# Patient Record
Sex: FEMALE | Race: WHITE | Employment: OTHER
[De-identification: names, ages, dates, MRNs, and addresses within clinical notes are randomized per-mention and may not be internally consistent; named-entity substitution may affect disease eponyms.]

---

## 2017-07-15 ENCOUNTER — HEALTH MAINTENANCE LETTER (OUTPATIENT)
Age: 62
End: 2017-07-15

## 2018-06-11 ENCOUNTER — OFFICE VISIT (OUTPATIENT)
Dept: FAMILY MEDICINE | Facility: CLINIC | Age: 63
End: 2018-06-11
Payer: MEDICARE

## 2018-06-11 VITALS
DIASTOLIC BLOOD PRESSURE: 59 MMHG | BODY MASS INDEX: 28.7 KG/M2 | WEIGHT: 162 LBS | TEMPERATURE: 98.7 F | OXYGEN SATURATION: 96 % | SYSTOLIC BLOOD PRESSURE: 109 MMHG | HEIGHT: 63 IN | HEART RATE: 128 BPM

## 2018-06-11 DIAGNOSIS — R53.83 FATIGUE, UNSPECIFIED TYPE: ICD-10-CM

## 2018-06-11 DIAGNOSIS — R18.8 CIRRHOSIS OF LIVER WITH ASCITES, UNSPECIFIED HEPATIC CIRRHOSIS TYPE (H): Primary | ICD-10-CM

## 2018-06-11 DIAGNOSIS — K74.60 CIRRHOSIS OF LIVER WITH ASCITES, UNSPECIFIED HEPATIC CIRRHOSIS TYPE (H): Primary | ICD-10-CM

## 2018-06-11 LAB
ALBUMIN SERPL-MCNC: 2.3 G/DL (ref 3.4–5)
ALP SERPL-CCNC: 412 U/L (ref 40–150)
ALT SERPL W P-5'-P-CCNC: 20 U/L (ref 0–50)
ANION GAP SERPL CALCULATED.3IONS-SCNC: 18 MMOL/L (ref 3–14)
AST SERPL W P-5'-P-CCNC: 165 U/L (ref 0–45)
BASOPHILS # BLD AUTO: 0 10E9/L (ref 0–0.2)
BASOPHILS NFR BLD AUTO: 0.1 %
BILIRUB SERPL-MCNC: 1.5 MG/DL (ref 0.2–1.3)
BUN SERPL-MCNC: 13 MG/DL (ref 7–30)
CALCIUM SERPL-MCNC: 8.5 MG/DL (ref 8.5–10.1)
CHLORIDE SERPL-SCNC: 78 MMOL/L (ref 94–109)
CO2 SERPL-SCNC: 16 MMOL/L (ref 20–32)
CREAT SERPL-MCNC: 0.5 MG/DL (ref 0.52–1.04)
DIFFERENTIAL METHOD BLD: ABNORMAL
EOSINOPHIL # BLD AUTO: 0 10E9/L (ref 0–0.7)
EOSINOPHIL NFR BLD AUTO: 0.1 %
ERYTHROCYTE [DISTWIDTH] IN BLOOD BY AUTOMATED COUNT: 14.6 % (ref 10–15)
GFR SERPL CREATININE-BSD FRML MDRD: >90 ML/MIN/1.7M2
GLUCOSE SERPL-MCNC: 113 MG/DL (ref 70–99)
HCT VFR BLD AUTO: 24.3 % (ref 35–47)
HGB BLD-MCNC: 8.5 G/DL (ref 11.7–15.7)
IMM GRANULOCYTES # BLD: 0.4 10E9/L (ref 0–0.4)
IMM GRANULOCYTES NFR BLD: 2.5 %
INR PPP: 1.45 (ref 0.86–1.14)
LYMPHOCYTES # BLD AUTO: 1.2 10E9/L (ref 0.8–5.3)
LYMPHOCYTES NFR BLD AUTO: 7 %
MCH RBC QN AUTO: 33.9 PG (ref 26.5–33)
MCHC RBC AUTO-ENTMCNC: 35 G/DL (ref 31.5–36.5)
MCV RBC AUTO: 97 FL (ref 78–100)
MONOCYTES # BLD AUTO: 0.8 10E9/L (ref 0–1.3)
MONOCYTES NFR BLD AUTO: 4.4 %
NEUTROPHILS # BLD AUTO: 15.1 10E9/L (ref 1.6–8.3)
NEUTROPHILS NFR BLD AUTO: 85.9 %
NRBC # BLD AUTO: 0.1 10*3/UL
NRBC BLD AUTO-RTO: 1 /100
PLATELET # BLD AUTO: 380 10E9/L (ref 150–450)
POTASSIUM SERPL-SCNC: 3.7 MMOL/L (ref 3.4–5.3)
PROT SERPL-MCNC: 7 G/DL (ref 6.8–8.8)
RBC # BLD AUTO: 2.51 10E12/L (ref 3.8–5.2)
SODIUM SERPL-SCNC: 112 MMOL/L (ref 133–144)
T4 FREE SERPL-MCNC: 1.56 NG/DL (ref 0.76–1.46)
TSH SERPL DL<=0.005 MIU/L-ACNC: 2.84 MU/L (ref 0.4–4)
WBC # BLD AUTO: 17.6 10E9/L (ref 4–11)

## 2018-06-11 PROCEDURE — 85610 PROTHROMBIN TIME: CPT | Performed by: FAMILY MEDICINE

## 2018-06-11 PROCEDURE — 84439 ASSAY OF FREE THYROXINE: CPT | Performed by: FAMILY MEDICINE

## 2018-06-11 PROCEDURE — 84443 ASSAY THYROID STIM HORMONE: CPT | Performed by: FAMILY MEDICINE

## 2018-06-11 PROCEDURE — 85025 COMPLETE CBC W/AUTO DIFF WBC: CPT | Performed by: FAMILY MEDICINE

## 2018-06-11 PROCEDURE — 99214 OFFICE O/P EST MOD 30 MIN: CPT | Performed by: FAMILY MEDICINE

## 2018-06-11 PROCEDURE — 36415 COLL VENOUS BLD VENIPUNCTURE: CPT | Performed by: FAMILY MEDICINE

## 2018-06-11 PROCEDURE — 80053 COMPREHEN METABOLIC PANEL: CPT | Performed by: FAMILY MEDICINE

## 2018-06-11 RX ORDER — OMEGA-3 FATTY ACIDS/FISH OIL 300-1000MG
400 CAPSULE ORAL EVERY 4 HOURS PRN
Status: ON HOLD | COMMUNITY
Start: 2018-06-11 | End: 2018-06-18

## 2018-06-11 NOTE — MR AVS SNAPSHOT
After Visit Summary   6/11/2018    Karen Tee    MRN: [id number]           Patient Information     Date Of Birth          1955        Visit Information        Provider Department      6/11/2018 1:20 PM Tyler Khalil MD [facility]        Today's Diagnoses     Cirrhosis of liver with ascites, unspecified hepatic cirrhosis type (H)    -  1    Fatigue, unspecified type          Care Instructions          Thank you for choosing [facility].  You may be receiving a survey in the mail from [facility] regarding your visit today.  Please take a few minutes to complete and return the survey to let us know how we are doing.      If you have questions or concerns, please contact us via [employer] or you can contact your care team at [phone].    Our Clinic hours are:  Monday 6:40 am  to 7:00 pm  Tuesday -Friday 6:40 am to 5:00 pm    The [place] outpatient lab hours are:  Monday - Friday 6:10 am to 4:45 pm  Saturdays 7:00 am to 11:00 am  Appointments are required, call [phone]    If you have clinical questions after hours or would like to schedule an appointment,  call the clinic at [phone].    (K74.60) Cirrhosis of liver with ascites, unspecified hepatic cirrhosis type (H)  (primary encounter diagnosis)  Comment:   Plan: ibuprofen 200 MG capsule, CBC with platelets         differential, Comprehensive metabolic panel,         INR, US Abdomen Limited, RADIOLOGY REFERRAL        If help is needed for finding an AA group, or if alcohol treatment is desired, then just our clinic RN at [phone].   We discussed the above labs and do these today and we will call the results and recommendations.   If the fluid in the abdomen is too muck then consider the procedure called paracentesis, where the fluid is partially removed.   Call our Radiology/Diagnostic imaging dept at [phone] to schedule.   Consider the nutritional supplement one to three servings daily. You should  take a daily Multivitamin with about 100% of the required daily amounts (RDA)  Consider the minimal daily activities or exercise as discussed.   Melatonin is a natural brain hormone for sleep and you get this over the counter and use this 30-40 minutes before bed.   You may use Advil or Ibuprofen at 200 or 400 mg per dose, with food, up to 3 times daily. Avoid Tylenol.             Follow-ups after your visit        Additional Services     RADIOLOGY REFERRAL       Your provider has referred you to: [facility] Gonzalez; possible paracentesis for fluid.     Please be aware that coverage of these services is subject to the terms and limitations of your health insurance plan.  Call member services at your health plan with any benefit or coverage questions.      Please bring the following to your appointment:    >>   Any x-rays, CTs or MRIs which have been performed.  Contact the facility where they were done to arrange for  prior to your scheduled appointment.    >>   List of current medications   >>   This referral request   >>   Any documents/labs given to you for this referral    Prior authorization is required for MRI/MRA, CT, Dexa Scans and Worker's Compensation cases.                  Future tests that were ordered for you today     Open Future Orders        Priority Expected Expires Ordered    US Abdomen Limited Routine  6/11/2019 6/11/2018            Who to contact     If you have questions or need follow up information about today's clinic visit or your schedule please contact [facility] directly at [phone].  Normal or non-critical lab and imaging results will be communicated to you by MyChart, letter or phone within 4 business days after the clinic has received the results. If you do not hear from us within 7 days, please contact the clinic through MyChart or phone. If you have a critical or abnormal lab result, we will notify you by phone as soon as possible.  Submit refill requests through  "MyChart or call your pharmacy and they will forward the refill request to us. Please allow 3 business days for your refill to be completed.          Additional Information About Your Visit        Care EveryWhere ID     This is your Care EveryWhere ID. This could be used by other organizations to access your [place] medical records  [id number]        Your Vitals Were     Pulse Temperature Height Last Period Pulse Oximetry BMI (Body Mass Index)    128 98.7  F (37.1  C) (Tympanic) 5' 2.5\" (1.588 m) 05/19/2007 96% 29.16 kg/m2       Blood Pressure from Last 3 Encounters:   06/11/18 109/59   03/09/12 133/69   12/13/10 109/67    Weight from Last 3 Encounters:   06/11/18 162 lb (73.5 kg)   03/09/12 114 lb (51.7 kg)   12/13/10 113 lb (51.3 kg)              We Performed the Following     CBC with platelets differential     Comprehensive metabolic panel     INR     RADIOLOGY REFERRAL     T4 FREE     TSH        Primary Care Provider Office Phone # Fax #    Tyler Khalil [phone] [address] [facility] [address]        Equal Access to Services     DONNA KRISHNAN : Hadii aad ku hadasho Sodeeali, waaxda luqadaha, qaybta kaalmada adeegyada, kiki yin checo oquendo . So [facility] [phone].    ATENCIÓN: Si habla español, tiene a joseph disposición servicios gratuitos de asistencia lingüística. Llame al [phone].    We comply with applicable federal civil rights laws and [place] laws. We do not discriminate on the basis of race, color, national origin, age, disability, sex, sexual orientation, or gender identity.            Thank you!     Thank you for choosing [facility]  for your care. Our goal is always to provide you with excellent care. Hearing back from our patients is one way we can continue to improve our services. Please take a few minutes to complete the written survey that you may receive in the mail after your visit with us. Thank you!             Your " Updated Medication List - Protect others around you: Learn how to safely use, store and throw away your medicines at www.disposemymeds.org.          This list is accurate as of 6/11/18  2:20 PM.  Always use your most recent med list.                   Brand Name Dispense Instructions for use Diagnosis    ibuprofen 200 MG capsule      Taking as needed for pain.    Cirrhosis of liver with ascites, unspecified hepatic cirrhosis type (H)

## 2018-06-11 NOTE — PROGRESS NOTES
"  SUBJECTIVE:   Karen Tee is a 62 year old female who presents to clinic today for the following health issues:      PAIN AND BLOATING      Duration: 2 weeks    Description (location/character/radiation): States the Cirrhosis is getting worse.  Lower back pain from that for the past two weeks.  No recent injury or twisting. She states she has been drinking alcohol daily again. She is having 5-6 drinks daily, unknown amount. Likely 7 ounces daily.     Intensity:  Severe-can hardly walk.    Accompanying signs and symptoms: Bloating that has been getting worse the past six months.    History (similar episodes/previous evaluation): None    Precipitating or alleviating factors: Walking up or going to stand up is painful.  Sitting and laying down is fine.    Therapies tried and outcome: Advil as needed, that will help for a short time.         Current Outpatient Prescriptions:      ibuprofen 200 MG capsule, Taking as needed for pain., Disp: , Rfl:     Patient Active Problem List   Diagnosis     Chronic depressive personality disorder     Major depressive disorder, single episode, mild (H)     Elevated blood pressure reading without diagnosis of hypertension     Tobacco use disorder     Hyperlipidemia     Inflammatory disease of breast     Cirrhosis of liver (H)     Ascites     Portal hypertension (H)     HYPERLIPIDEMIA LDL GOAL <100     Alcoholism (H)       Blood pressure 109/59, pulse 128, temperature 98.7  F (37.1  C), temperature source Tympanic, height 5' 2.5\" (1.588 m), weight 162 lb (73.5 kg), last menstrual period 05/19/2007, SpO2 96 %.    Exam:  GENERAL APPEARANCE: alert, mild distress, cooperative, over weight and the abdomen is bloated.   EYES: EOMI,  PERRL  NECK: no adenopathy, no asymmetry, masses, or scars and thyroid normal to palpation  RESP: lungs clear to auscultation - no rales, rhonchi or wheezes  CV: regular rates and rhythm, normal S1 S2, no S3 or S4 and no murmur, click or rub -  ABDOMEN: " bloated but not tender. Large amount of ascites noted. The liver cannot be palpated.   PSYCH: affect flat, anxious and worried      (K74.60) Cirrhosis of liver with ascites, unspecified hepatic cirrhosis type (H)  (primary encounter diagnosis)  Comment:   Plan: ibuprofen 200 MG capsule, CBC with platelets         differential, Comprehensive metabolic panel,         INR, US Abdomen Limited, RADIOLOGY REFERRAL        If help is needed for finding an AA group, or if alcohol treatment is desired, then just our clinic RN at [phone].   We discussed the above labs and do these today and we will call the results and recommendations.   If the fluid in the abdomen is too muck then consider the procedure called paracentesis, where the fluid is partially removed.   Call our Radiology/Diagnostic imaging dept at [phone] to schedule.   Consider the nutritional supplement one to three servings daily. You should take a daily Multivitamin with about 100% of the required daily amounts (RDA)  Consider the minimal daily activities or exercise as discussed.   Melatonin is a natural brain hormone for sleep and you get this over the counter and use this 30-40 minutes before bed.   You may use Advil or Ibuprofen at 200 or 400 mg per dose, with food, up to 3 times daily. Avoid Tylenol.       Tyler Khalil

## 2018-06-11 NOTE — PATIENT INSTRUCTIONS
Thank you for choosing [facility].  You may be receiving a survey in the mail from [facility] regarding your visit today.  Please take a few minutes to complete and return the survey to let us know how we are doing.      If you have questions or concerns, please contact us via [employer] or you can contact your care team at [phone].    Our Clinic hours are:  Monday 6:40 am  to 7:00 pm  Tuesday -Friday 6:40 am to 5:00 pm    The [place] outpatient lab hours are:  Monday - Friday 6:10 am to 4:45 pm  Saturdays 7:00 am to 11:00 am  Appointments are required, call [phone]    If you have clinical questions after hours or would like to schedule an appointment,  call the clinic at [phone].    (K74.60) Cirrhosis of liver with ascites, unspecified hepatic cirrhosis type (H)  (primary encounter diagnosis)  Comment:   Plan: ibuprofen 200 MG capsule, CBC with platelets         differential, Comprehensive metabolic panel,         INR, US Abdomen Limited, RADIOLOGY REFERRAL        If help is needed for finding an AA group, or if alcohol treatment is desired, then just our clinic RN at [phone].   We discussed the above labs and do these today and we will call the results and recommendations.   If the fluid in the abdomen is too muck then consider the procedure called paracentesis, where the fluid is partially removed.   Call our Radiology/Diagnostic imaging dept at [phone] to schedule.   Consider the nutritional supplement one to three servings daily. You should take a daily Multivitamin with about 100% of the required daily amounts (RDA)  Consider the minimal daily activities or exercise as discussed.   Melatonin is a natural brain hormone for sleep and you get this over the counter and use this 30-40 minutes before bed.   You may use Advil or Ibuprofen at 200 or 400 mg per dose, with food, up to 3 times daily. Avoid Tylenol.

## 2018-06-12 ENCOUNTER — HOSPITAL ENCOUNTER (INPATIENT)
Facility: CLINIC | Age: 63
LOS: 6 days | Discharge: NURSING, REHAB OR LONG-TERM CARE | DRG: 432 | End: 2018-06-18
Attending: EMERGENCY MEDICINE | Admitting: INTERNAL MEDICINE
Payer: MEDICARE

## 2018-06-12 ENCOUNTER — APPOINTMENT (OUTPATIENT)
Dept: GENERAL RADIOLOGY | Facility: CLINIC | Age: 63
DRG: 432 | End: 2018-06-12
Attending: EMERGENCY MEDICINE
Payer: MEDICARE

## 2018-06-12 ENCOUNTER — TELEPHONE (OUTPATIENT)
Dept: FAMILY MEDICINE | Facility: CLINIC | Age: 63
End: 2018-06-12

## 2018-06-12 DIAGNOSIS — R18.8 CIRRHOSIS OF LIVER WITH ASCITES, UNSPECIFIED HEPATIC CIRRHOSIS TYPE (H): Primary | ICD-10-CM

## 2018-06-12 DIAGNOSIS — E87.1 HYPONATREMIA: ICD-10-CM

## 2018-06-12 DIAGNOSIS — K74.60 CIRRHOSIS OF LIVER WITH ASCITES, UNSPECIFIED HEPATIC CIRRHOSIS TYPE (H): Primary | ICD-10-CM

## 2018-06-12 DIAGNOSIS — K70.30 ALCOHOLIC CIRRHOSIS OF LIVER WITHOUT ASCITES (H): ICD-10-CM

## 2018-06-12 PROBLEM — R79.1 ELEVATED INR: Status: ACTIVE | Noted: 2018-06-12

## 2018-06-12 PROBLEM — D72.829 LEUKOCYTOSIS: Status: ACTIVE | Noted: 2018-06-12

## 2018-06-12 PROBLEM — R79.89 ABNORMAL LFTS: Status: ACTIVE | Noted: 2018-06-12

## 2018-06-12 LAB
ALBUMIN SERPL-MCNC: 2.4 G/DL (ref 3.4–5)
ALP SERPL-CCNC: 415 U/L (ref 40–150)
ALT SERPL W P-5'-P-CCNC: 20 U/L (ref 0–50)
AMMONIA PLAS-SCNC: 25 UMOL/L (ref 10–50)
ANION GAP SERPL CALCULATED.3IONS-SCNC: 15 MMOL/L (ref 3–14)
AST SERPL W P-5'-P-CCNC: 148 U/L (ref 0–45)
BASOPHILS # BLD AUTO: 0 10E9/L (ref 0–0.2)
BASOPHILS NFR BLD AUTO: 0.1 %
BILIRUB SERPL-MCNC: 1.6 MG/DL (ref 0.2–1.3)
BUN SERPL-MCNC: 15 MG/DL (ref 7–30)
CALCIUM SERPL-MCNC: 8.6 MG/DL (ref 8.5–10.1)
CHLORIDE SERPL-SCNC: 78 MMOL/L (ref 94–109)
CO2 SERPL-SCNC: 20 MMOL/L (ref 20–32)
CREAT SERPL-MCNC: 0.57 MG/DL (ref 0.52–1.04)
DIFFERENTIAL METHOD BLD: ABNORMAL
EOSINOPHIL # BLD AUTO: 0 10E9/L (ref 0–0.7)
EOSINOPHIL NFR BLD AUTO: 0.1 %
ERYTHROCYTE [DISTWIDTH] IN BLOOD BY AUTOMATED COUNT: 14.6 % (ref 10–15)
GFR SERPL CREATININE-BSD FRML MDRD: >90 ML/MIN/1.7M2
GLUCOSE BLDC GLUCOMTR-MCNC: 113 MG/DL (ref 70–99)
GLUCOSE BLDC GLUCOMTR-MCNC: 120 MG/DL (ref 70–99)
GLUCOSE SERPL-MCNC: 104 MG/DL (ref 70–99)
HCT VFR BLD AUTO: 24.1 % (ref 35–47)
HGB BLD-MCNC: 8.3 G/DL (ref 11.7–15.7)
IMM GRANULOCYTES # BLD: 0.4 10E9/L (ref 0–0.4)
IMM GRANULOCYTES NFR BLD: 2.2 %
LACTATE BLD-SCNC: 1.5 MMOL/L (ref 0.4–1.9)
LYMPHOCYTES # BLD AUTO: 1.5 10E9/L (ref 0.8–5.3)
LYMPHOCYTES NFR BLD AUTO: 9.1 %
MCH RBC QN AUTO: 32.9 PG (ref 26.5–33)
MCHC RBC AUTO-ENTMCNC: 34.4 G/DL (ref 31.5–36.5)
MCV RBC AUTO: 96 FL (ref 78–100)
MONOCYTES # BLD AUTO: 0.7 10E9/L (ref 0–1.3)
MONOCYTES NFR BLD AUTO: 4.3 %
MRSA DNA SPEC QL NAA+PROBE: NEGATIVE
NEUTROPHILS # BLD AUTO: 13.9 10E9/L (ref 1.6–8.3)
NEUTROPHILS NFR BLD AUTO: 84.2 %
NRBC # BLD AUTO: 0.1 10*3/UL
NRBC BLD AUTO-RTO: 1 /100
OSMOLALITY SERPL: 241 MMOL/KG (ref 280–301)
OSMOLALITY UR: 379 MMOL/KG (ref 100–1200)
PLATELET # BLD AUTO: 372 10E9/L (ref 150–450)
POTASSIUM SERPL-SCNC: 3.6 MMOL/L (ref 3.4–5.3)
PROT SERPL-MCNC: 7.2 G/DL (ref 6.8–8.8)
RBC # BLD AUTO: 2.52 10E12/L (ref 3.8–5.2)
SODIUM SERPL-SCNC: 113 MMOL/L (ref 133–144)
SODIUM SERPL-SCNC: 115 MMOL/L (ref 133–144)
SODIUM SERPL-SCNC: 115 MMOL/L (ref 133–144)
SODIUM UR-SCNC: 10 MMOL/L
SPECIMEN SOURCE: NORMAL
TSH SERPL DL<=0.005 MIU/L-ACNC: 2.95 MU/L (ref 0.4–4)
URATE 24H UR-MRATE: 0.8 G/G CR
URATE SERPL-MCNC: 8.2 MG/DL (ref 2.6–6)
URATE UR-MCNC: 125.8 MG/DL
WBC # BLD AUTO: 16.5 10E9/L (ref 4–11)

## 2018-06-12 PROCEDURE — 84295 ASSAY OF SERUM SODIUM: CPT | Performed by: EMERGENCY MEDICINE

## 2018-06-12 PROCEDURE — 99285 EMERGENCY DEPT VISIT HI MDM: CPT | Mod: 25 | Performed by: EMERGENCY MEDICINE

## 2018-06-12 PROCEDURE — 84300 ASSAY OF URINE SODIUM: CPT | Performed by: PHYSICIAN ASSISTANT

## 2018-06-12 PROCEDURE — 00000146 ZZHCL STATISTIC GLUCOSE BY METER IP

## 2018-06-12 PROCEDURE — 84550 ASSAY OF BLOOD/URIC ACID: CPT | Performed by: EMERGENCY MEDICINE

## 2018-06-12 PROCEDURE — 83930 ASSAY OF BLOOD OSMOLALITY: CPT | Performed by: EMERGENCY MEDICINE

## 2018-06-12 PROCEDURE — 82140 ASSAY OF AMMONIA: CPT | Performed by: EMERGENCY MEDICINE

## 2018-06-12 PROCEDURE — A9270 NON-COVERED ITEM OR SERVICE: HCPCS | Mod: GY | Performed by: PHYSICIAN ASSISTANT

## 2018-06-12 PROCEDURE — 25000132 ZZH RX MED GY IP 250 OP 250 PS 637: Mod: GY | Performed by: PHYSICIAN ASSISTANT

## 2018-06-12 PROCEDURE — 87640 STAPH A DNA AMP PROBE: CPT | Performed by: PHYSICIAN ASSISTANT

## 2018-06-12 PROCEDURE — 84560 ASSAY OF URINE/URIC ACID: CPT | Performed by: PHYSICIAN ASSISTANT

## 2018-06-12 PROCEDURE — 99223 1ST HOSP IP/OBS HIGH 75: CPT | Mod: AI | Performed by: INTERNAL MEDICINE

## 2018-06-12 PROCEDURE — 71046 X-RAY EXAM CHEST 2 VIEWS: CPT

## 2018-06-12 PROCEDURE — 20000003 ZZH R&B ICU

## 2018-06-12 PROCEDURE — 25000128 H RX IP 250 OP 636: Performed by: EMERGENCY MEDICINE

## 2018-06-12 PROCEDURE — 36415 COLL VENOUS BLD VENIPUNCTURE: CPT | Performed by: INTERNAL MEDICINE

## 2018-06-12 PROCEDURE — 84443 ASSAY THYROID STIM HORMONE: CPT | Performed by: EMERGENCY MEDICINE

## 2018-06-12 PROCEDURE — 25000132 ZZH RX MED GY IP 250 OP 250 PS 637: Mod: GY | Performed by: EMERGENCY MEDICINE

## 2018-06-12 PROCEDURE — 99207 ZZC APP CREDIT; MD BILLING SHARED VISIT: CPT | Performed by: PHYSICIAN ASSISTANT

## 2018-06-12 PROCEDURE — 80053 COMPREHEN METABOLIC PANEL: CPT | Performed by: EMERGENCY MEDICINE

## 2018-06-12 PROCEDURE — 25000128 H RX IP 250 OP 636: Performed by: PHYSICIAN ASSISTANT

## 2018-06-12 PROCEDURE — 96361 HYDRATE IV INFUSION ADD-ON: CPT | Performed by: EMERGENCY MEDICINE

## 2018-06-12 PROCEDURE — 84295 ASSAY OF SERUM SODIUM: CPT | Performed by: INTERNAL MEDICINE

## 2018-06-12 PROCEDURE — 82533 TOTAL CORTISOL: CPT | Performed by: EMERGENCY MEDICINE

## 2018-06-12 PROCEDURE — 96360 HYDRATION IV INFUSION INIT: CPT | Performed by: EMERGENCY MEDICINE

## 2018-06-12 PROCEDURE — 83605 ASSAY OF LACTIC ACID: CPT | Performed by: INTERNAL MEDICINE

## 2018-06-12 PROCEDURE — 87641 MR-STAPH DNA AMP PROBE: CPT | Performed by: PHYSICIAN ASSISTANT

## 2018-06-12 PROCEDURE — HZ2ZZZZ DETOXIFICATION SERVICES FOR SUBSTANCE ABUSE TREATMENT: ICD-10-PCS | Performed by: FAMILY MEDICINE

## 2018-06-12 PROCEDURE — A9270 NON-COVERED ITEM OR SERVICE: HCPCS | Mod: GY | Performed by: EMERGENCY MEDICINE

## 2018-06-12 PROCEDURE — 85025 COMPLETE CBC W/AUTO DIFF WBC: CPT | Performed by: EMERGENCY MEDICINE

## 2018-06-12 PROCEDURE — 83935 ASSAY OF URINE OSMOLALITY: CPT | Performed by: PHYSICIAN ASSISTANT

## 2018-06-12 RX ORDER — BISACODYL 5 MG
15 TABLET, DELAYED RELEASE (ENTERIC COATED) ORAL DAILY PRN
Status: DISCONTINUED | OUTPATIENT
Start: 2018-06-12 | End: 2018-06-18 | Stop reason: HOSPADM

## 2018-06-12 RX ORDER — HYDROMORPHONE HYDROCHLORIDE 1 MG/ML
.3-.5 INJECTION, SOLUTION INTRAMUSCULAR; INTRAVENOUS; SUBCUTANEOUS
Status: DISCONTINUED | OUTPATIENT
Start: 2018-06-12 | End: 2018-06-18 | Stop reason: HOSPADM

## 2018-06-12 RX ORDER — PROCHLORPERAZINE MALEATE 10 MG
10 TABLET ORAL EVERY 6 HOURS PRN
Status: DISCONTINUED | OUTPATIENT
Start: 2018-06-12 | End: 2018-06-12

## 2018-06-12 RX ORDER — PROCHLORPERAZINE 25 MG
25 SUPPOSITORY, RECTAL RECTAL EVERY 12 HOURS PRN
Status: DISCONTINUED | OUTPATIENT
Start: 2018-06-12 | End: 2018-06-18 | Stop reason: HOSPADM

## 2018-06-12 RX ORDER — POLYETHYLENE GLYCOL 3350 17 G/17G
17 POWDER, FOR SOLUTION ORAL DAILY PRN
Status: DISCONTINUED | OUTPATIENT
Start: 2018-06-12 | End: 2018-06-18 | Stop reason: HOSPADM

## 2018-06-12 RX ORDER — MAGNESIUM CARB/ALUMINUM HYDROX 105-160MG
148 TABLET,CHEWABLE ORAL
Status: DISCONTINUED | OUTPATIENT
Start: 2018-06-12 | End: 2018-06-18 | Stop reason: HOSPADM

## 2018-06-12 RX ORDER — NALOXONE HYDROCHLORIDE 0.4 MG/ML
.1-.4 INJECTION, SOLUTION INTRAMUSCULAR; INTRAVENOUS; SUBCUTANEOUS
Status: DISCONTINUED | OUTPATIENT
Start: 2018-06-12 | End: 2018-06-18 | Stop reason: HOSPADM

## 2018-06-12 RX ORDER — ONDANSETRON 2 MG/ML
4 INJECTION INTRAMUSCULAR; INTRAVENOUS EVERY 6 HOURS PRN
Status: DISCONTINUED | OUTPATIENT
Start: 2018-06-12 | End: 2018-06-12

## 2018-06-12 RX ORDER — NALOXONE HYDROCHLORIDE 0.4 MG/ML
.1-.4 INJECTION, SOLUTION INTRAMUSCULAR; INTRAVENOUS; SUBCUTANEOUS
Status: DISCONTINUED | OUTPATIENT
Start: 2018-06-12 | End: 2018-06-12

## 2018-06-12 RX ORDER — PROCHLORPERAZINE MALEATE 10 MG
10 TABLET ORAL EVERY 6 HOURS PRN
Status: DISCONTINUED | OUTPATIENT
Start: 2018-06-12 | End: 2018-06-18 | Stop reason: HOSPADM

## 2018-06-12 RX ORDER — FUROSEMIDE 10 MG/ML
20 INJECTION INTRAMUSCULAR; INTRAVENOUS ONCE
Status: COMPLETED | OUTPATIENT
Start: 2018-06-12 | End: 2018-06-12

## 2018-06-12 RX ORDER — ONDANSETRON 4 MG/1
4 TABLET, ORALLY DISINTEGRATING ORAL EVERY 6 HOURS PRN
Status: DISCONTINUED | OUTPATIENT
Start: 2018-06-12 | End: 2018-06-18 | Stop reason: HOSPADM

## 2018-06-12 RX ORDER — FUROSEMIDE 10 MG/ML
20 INJECTION INTRAMUSCULAR; INTRAVENOUS ONCE
Status: DISCONTINUED | OUTPATIENT
Start: 2018-06-13 | End: 2018-06-12

## 2018-06-12 RX ORDER — SPIRONOLACTONE 25 MG/1
25 TABLET ORAL DAILY
Status: DISCONTINUED | OUTPATIENT
Start: 2018-06-12 | End: 2018-06-13

## 2018-06-12 RX ORDER — IBUPROFEN 400 MG/1
400 TABLET, FILM COATED ORAL ONCE
Status: COMPLETED | OUTPATIENT
Start: 2018-06-12 | End: 2018-06-12

## 2018-06-12 RX ORDER — BISACODYL 5 MG
10 TABLET, DELAYED RELEASE (ENTERIC COATED) ORAL DAILY PRN
Status: DISCONTINUED | OUTPATIENT
Start: 2018-06-12 | End: 2018-06-18 | Stop reason: HOSPADM

## 2018-06-12 RX ORDER — LORAZEPAM 2 MG/ML
1-4 INJECTION INTRAMUSCULAR
Status: DISCONTINUED | OUTPATIENT
Start: 2018-06-12 | End: 2018-06-18 | Stop reason: HOSPADM

## 2018-06-12 RX ORDER — LORAZEPAM 2 MG/ML
1-4 INJECTION INTRAMUSCULAR
Status: DISCONTINUED | OUTPATIENT
Start: 2018-06-12 | End: 2018-06-15

## 2018-06-12 RX ORDER — ONDANSETRON 4 MG/1
4 TABLET, ORALLY DISINTEGRATING ORAL EVERY 6 HOURS PRN
Status: DISCONTINUED | OUTPATIENT
Start: 2018-06-12 | End: 2018-06-12

## 2018-06-12 RX ORDER — ONDANSETRON 2 MG/ML
4 INJECTION INTRAMUSCULAR; INTRAVENOUS EVERY 6 HOURS PRN
Status: DISCONTINUED | OUTPATIENT
Start: 2018-06-12 | End: 2018-06-18 | Stop reason: HOSPADM

## 2018-06-12 RX ORDER — BISACODYL 5 MG
5 TABLET, DELAYED RELEASE (ENTERIC COATED) ORAL DAILY PRN
Status: DISCONTINUED | OUTPATIENT
Start: 2018-06-12 | End: 2018-06-18 | Stop reason: HOSPADM

## 2018-06-12 RX ORDER — PROCHLORPERAZINE 25 MG
25 SUPPOSITORY, RECTAL RECTAL EVERY 12 HOURS PRN
Status: DISCONTINUED | OUTPATIENT
Start: 2018-06-12 | End: 2018-06-12

## 2018-06-12 RX ADMIN — IBUPROFEN 400 MG: 400 TABLET ORAL at 13:54

## 2018-06-12 RX ADMIN — SODIUM CHLORIDE 1000 ML: 9 INJECTION, SOLUTION INTRAVENOUS at 10:50

## 2018-06-12 RX ADMIN — FUROSEMIDE 20 MG: 10 INJECTION, SOLUTION INTRAVENOUS at 16:22

## 2018-06-12 RX ADMIN — FUROSEMIDE 20 MG: 10 INJECTION, SOLUTION INTRAVENOUS at 19:31

## 2018-06-12 RX ADMIN — SPIRONOLACTONE 25 MG: 25 TABLET ORAL at 16:22

## 2018-06-12 NOTE — LETTER
Transition Communication Hand-off for Care Transitions to Next Level of Care Provider    Name: Karen Tee  : 1955  MRN #: [id number]  Primary Care Provider: Tyler Khalil  Primary Care MD Name: Dr. Khalil  Primary Clinic: [address] [place] [id number]  [facility]  Reason for Hospitalization:  Hyponatremia [E87.1]  Admit Date/Time: 2018 10:55 AM  Discharge Date: 18  Payor Source: Payor: MEDICARE / Plan: MEDICARE / Product Type: Medicare /     Readmission Assessment Measure (OZZIE) Risk Score/category: low           Reason for Communication Hand-off Referral: Fragility    Discharge Plan: TCU       Concern for non-adherence with plan of care:   Y/N no  Discharge Needs Assessment:  Needs       Most Recent Value    # of Referrals Placed by [facility] Internal Clinic Care Coordination, Post Acute Facilities, Homecare          Follow-up plan:  No future appointments.    Any outstanding tests or procedures:        Referrals     Future Labs/Procedures    Occupational Therapy Adult Consult     Comments:    Evaluate and treat as clinically indicated.    Reason:  Weak cirrhosis    Physical Therapy Adult Consult     Comments:    Evaluate and treat as clinically indicated.    Reason:  Weak, cirrhosis            Key Recommendations:  Pt is discharging today to [facility] TCU Phone: (Admissions: [phone] RN Report: [phone] Fax: [phone]) . Pt in agreement with dc plan. Goal is for pt to return home. Per TALYA Esqueda she is committed to quitting drinking and smoking.     Trinity Rodgers MSW, [facility], [phone]      AVS/Discharge Summary is the source of truth; this is a helpful guide for improved communication of patient story

## 2018-06-12 NOTE — IP AVS SNAPSHOT
MRN:[id number]                      After Visit Summary   6/12/2018    Karen Tee    MRN: [id number]           Thank you!     Thank you for choosing [facility] for your care. Our goal is always to provide you with excellent care. Hearing back from our patients is one way we can continue to improve our services. Please take a few minutes to complete the written survey that you may receive in the mail after you visit with us. Thank you!        Patient Information     Date Of Birth          1955        Designated Caregiver       Most Recent Value    Caregiver    Will someone help with your care after discharge? yes    Name of designated caregiver sister    Phone number of caregiver [phone]    Caregiver address [place]      About your hospital stay     You were admitted on:  June 12, 2018 You last received care in the:  [facility] Intensive Care    You were discharged on:  June 18, 2018       Who to Call     For medical emergencies, please call 911.  For non-urgent questions about your medical care, please call your primary care provider or clinic, [phone]          Attending Provider     Provider Specialty    Joe Ma MD Emergency Medicine    [facility], Moy Liang MD Internal Medicine    [facility]    Mary Ellen Mazariegos MD Internal Medicine       Primary Care Provider Office Phone # Fax #    Tyler Gasper Khalil [phone]      After Care Instructions     Activity - Up with nursing assistance           Advance Diet as Tolerated       Follow this diet upon discharge:            Daily weights       Call Provider for weight gain of more than 2 pounds per day or 5 pounds per week.            Follow Up (TCM)       Need cbc and BMP daily for 3 days and then as per [facility] doc.            Follow Up (TCM)       Follow up primary md 1 week            General info for SNF       Length of Stay Estimate: Short Term Care: Estimated # of  "Days <30  Condition at Discharge: Improving  Level of care:skilled   Rehabilitation Potential: Good  Admission H&P remains valid and up-to-date: Yes  Recent Chemotherapy: N/A  Use Nursing Home Standing Orders: N/A            Mantoux instructions       Give two-step Mantoux (PPD) Per Facility Policy                  Additional Services     Occupational Therapy Adult Consult       Evaluate and treat as clinically indicated.    Reason:  Weak cirrhosis            Physical Therapy Adult Consult       Evaluate and treat as clinically indicated.    Reason:  Weak, cirrhosis                  Pending Results     Date and Time Order Name Status Description    6/16/2018 1818 Blood culture Preliminary     6/16/2018 1818 Blood culture Preliminary     6/13/2018 1307 Anaerobic bacterial culture Preliminary             Statement of Approval     Ordered          06/18/18 1157  I have reviewed and agree with all the recommendations and orders detailed in this document.  EFFECTIVE NOW     Approved and electronically signed by:  Mary Ellen Mazariegos MD             Admission Information     Date & Time Provider Department Dept. Phone    6/12/2018 Mary Ellen Mazariegos MD [facility]ensive [facility] [phone]      Your Vitals Were     Blood Pressure Pulse Temperature Respirations Height Weight    95/58 (BP Location: Left arm) 96 98.2  F (36.8  C) (Oral) 18 1.6 m (5' 3\") 65.6 kg (144 lb 10 oz)    Last Period Pulse Oximetry BMI (Body Mass Index)             05/19/2007 96% 25.62 kg/m2         Care EveryWhere ID     This is your Care EveryWhere ID. This could be used by other organizations to access your [place] medical records  [id number]        Equal Access to Services     [facility] ARIELLA AH: Hadii philippe Peace, wajordenda lujose manueladaha, qaybta kaalmada harper, kiki hobbs. So [facility] [phone].    ATENCIÓN: Si habla español, tiene a joseph disposición servicios gratuitos de asistencia lingüística. Llame al " [phone].    We comply with applicable federal civil rights laws and [place] laws. We do not discriminate on the basis of race, color, national origin, age, disability, sex, sexual orientation, or gender identity.               Review of your medicines      START taking        Dose / Directions    folic acid 1 MG tablet   Commonly known as:  FOLVITE   Used for:  Alcoholic cirrhosis of liver without ascites (H)        Dose:  1 mg   Start taking on:  6/19/2018   Take 1 tablet (1 mg) by mouth daily   Quantity:  30 tablet   Refills:  0       furosemide 40 MG tablet   Commonly known as:  LASIX        Dose:  40 mg   Start taking on:  6/19/2018   Take 1 tablet (40 mg) by mouth daily   Quantity:  30 tablet   Refills:  0       spironolactone 25 MG tablet   Commonly known as:  ALDACTONE   Used for:  Cirrhosis of liver with ascites, unspecified hepatic cirrhosis type (H)        Dose:  25 mg   Start taking on:  6/19/2018   Take 1 tablet (25 mg) by mouth daily   Quantity:  30 tablet   Refills:  0         STOP taking     ibuprofen 200 MG capsule                Where to get your medicines      Some of these will need a paper prescription and others can be bought over the counter. Ask your nurse if you have questions.     You don't need a prescription for these medications     folic acid 1 MG tablet    furosemide 40 MG tablet    spironolactone 25 MG tablet                Protect others around you: Learn how to safely use, store and throw away your medicines at www.disposemymeds.org.             Medication List: This is a list of all your medications and when to take them. Check marks below indicate your daily home schedule. Keep this list as a reference.      Medications           Morning Afternoon Evening Bedtime As Needed    folic acid 1 MG tablet   Commonly known as:  FOLVITE   Take 1 tablet (1 mg) by mouth daily   Start taking on:  6/19/2018   Last time this was given:  1 mg on 6/18/2018  8:37 AM                                 furosemide 40 MG tablet   Commonly known as:  LASIX   Take 1 tablet (40 mg) by mouth daily   Start taking on:  6/19/2018   Last time this was given:  40 mg on 6/18/2018  8:37 AM                                spironolactone 25 MG tablet   Commonly known as:  ALDACTONE   Take 1 tablet (25 mg) by mouth daily   Start taking on:  6/19/2018   Last time this was given:  25 mg on 6/18/2018  8:38 AM

## 2018-06-12 NOTE — IP AVS SNAPSHOT
"[facility] INTENSIVE CARE: [phone]                                              INTERAGENCY TRANSFER FORM - LAB / IMAGING / EKG / EMG RESULTS   2018                    Hospital Admission Date: 2018  KAR RINCON   : 1955  Sex: Female        Attending Provider: Mary Ellen Mazariegos MD     Allergies:  No Known Allergies    Infection:  None   Service:  ICU    Ht:  1.6 m (5' 3\")   Wt:  65.6 kg (144 lb 10 oz)   Admission Wt:  76.2 kg (168 lb)    BMI:  25.62 kg/m 2   BSA:  1.71 m 2            Patient PCP Information     Provider PCP Type    Tyler Khalil MD General         Lab Results - 3 Days      Fluid Culture Aerobic Bacterial [[id number]]  Resulted: 18 0819, Result status: Final result    Ordering provider: Moy Mckeon MD  18 1307 Resulting lab: INFECTIOUS DISEASE DIAGNOSTIC LABORATORY    Specimen Information    Type Source Collected On   Ascites Ascites 18 1440          Components       Value Reference Range Flag Lab   Specimen Description Ascites      Special Requests Received in anaerobic tubes.   75   Culture Micro No growth   225            Blood culture [[id number]]  Resulted: 18 0613, Result status: Preliminary result    Ordering provider: Garth Shepard MD  18 181 Resulting lab: MICRO RAPID TESTING LAB    Specimen Information    Type Source Collected On   Blood  18 1859   Comment:  Left Arm          Components       Value Reference Range Flag Lab   Specimen Description Blood Left Arm      Special Requests Aerobic and anaerobic bottles received   75   Culture Micro No growth after 1 day   226            Blood culture [[id number]]  Resulted: 18 0613, Result status: Preliminary result    Ordering provider: Garth Shepard MD  18 181 Resulting lab: MICRO RAPID TESTING LAB    Specimen Information    Type Source Collected On   Blood  18 1907   Comment:  Right Arm          Components       Value Reference " Range Flag Lab   Specimen Description Blood Right Arm      Special Requests Aerobic and anaerobic bottles received   75   Culture Micro No growth after 1 day   226            Comprehensive metabolic panel [[id number]] (Abnormal)  Resulted: 06/18/18 0548, Result status: Final result    Ordering provider: Garth Shepard MD  06/18/18 0000 Resulting lab: [facility]    Specimen Information    Type Source Collected On   Blood  06/18/18 0526          Components       Value Reference Range Flag Lab   Sodium 130 133 - 144 mmol/L L 59   Potassium 4.0 3.4 - 5.3 mmol/L  59   Chloride 96 94 - 109 mmol/L  59   Carbon Dioxide 22 20 - 32 mmol/L  59   Anion Gap 12 3 - 14 mmol/L  59   Glucose 86 70 - 99 mg/dL  59   Urea Nitrogen 15 7 - 30 mg/dL  59   Creatinine 0.61 0.52 - 1.04 mg/dL  59   GFR Estimate >90 >60 mL/min/1.7m2  59   Comment:  Non  GFR Calc   GFR Estimate If Black >90 >60 mL/min/1.7m2  59   Comment:  African American GFR Calc   Calcium 7.7 8.5 - 10.1 mg/dL L 59   Bilirubin Total 1.0 0.2 - 1.3 mg/dL  59   Albumin 2.1 3.4 - 5.0 g/dL L 59   Protein Total 5.8 6.8 - 8.8 g/dL L 59   Alkaline Phosphatase 281 40 - 150 U/L H 59   ALT 12 0 - 50 U/L  59   AST 93 0 - 45 U/L H 59            CBC with platelets [[id number]] (Abnormal)  Resulted: 06/18/18 0531, Result status: Final result    Ordering provider: Garth Shepard MD  06/18/18 0000 Resulting lab: [facility]    Specimen Information    Type Source Collected On   Blood  06/18/18 0526          Components       Value Reference Range Flag Lab   WBC 13.5 4.0 - 11.0 10e9/L H 59   RBC Count 2.61 3.8 - 5.2 10e12/L L 59   Hemoglobin 8.7 11.7 - 15.7 g/dL L 59   Hematocrit 25.1 35.0 - 47.0 % L 59   MCV 96 78 - 100 fl  59   MCH 33.3 26.5 - 33.0 pg H 59   MCHC 34.7 31.5 - 36.5 g/dL  59   RDW 16.1 10.0 - 15.0 % H 59   Platelet Count 189 150 - 450 10e9/L  59            Sodium [[id number]] (Abnormal)  Resulted: 06/17/18 7031, Result  status: Final result    Ordering provider: Garth Shepard MD  06/17/18 1600 Resulting lab: [facility]    Specimen Information    Type Source Collected On   Blood  06/17/18 2158          Components       Value Reference Range Flag Lab   Sodium 130 133 - 144 mmol/L L 59            Sodium [[id number]] (Abnormal)  Resulted: 06/17/18 1432, Result status: Final result    Ordering provider: Garth Shepard MD  06/17/18 0800 Resulting lab: [facility]    Specimen Information    Type Source Collected On   Blood  06/17/18 1411          Components       Value Reference Range Flag Lab   Sodium 129 133 - 144 mmol/L L 59            Vitamin B12 [[id number]]  Resulted: 06/17/18 1419, Result status: Final result    Ordering provider: Garth Shepard MD  06/17/18 0745 Resulting lab: [facility]    Specimen Information    Type Source Collected On   Blood  06/14/18 0440          Components       Value Reference Range Flag Lab   Vitamin B12 679 193 - 986 pg/mL  51            Folate [[id number]]  Resulted: 06/17/18 1408, Result status: Final result    Ordering provider: Garth Shepard MD  06/17/18 0745 Resulting lab: [facility]    Specimen Information    Type Source Collected On   Blood  06/17/18 0500          Components       Value Reference Range Flag Lab   Folate 7.8 >5.4 ng/mL  51            Comprehensive metabolic panel [[id number]] (Abnormal)  Resulted: 06/17/18 0529, Result status: Final result    Ordering provider: Garth Shepard MD  06/17/18 0000 Resulting lab: [facility]    Specimen Information    Type Source Collected On   Blood  06/17/18 0503          Components       Value Reference Range Flag Lab   Sodium 128 133 - 144 mmol/L L 59   Potassium 3.8 3.4 - 5.3 mmol/L  59   Chloride 93 94 - 109 mmol/L L 59   Carbon Dioxide 25 20 - 32 mmol/L  59   Anion Gap 10 3 - 14 mmol/L  59   Glucose 92 70 -  99 mg/dL  59   Urea Nitrogen 14 7 - 30 mg/dL  59   Creatinine 0.60 0.52 - 1.04 mg/dL  59   GFR Estimate >90 >60 mL/min/1.7m2  59   Comment:  Non  GFR Calc   GFR Estimate If Black >90 >60 mL/min/1.7m2  59   Comment:  African American GFR Calc   Calcium 7.7 8.5 - 10.1 mg/dL L 59   Bilirubin Total 0.8 0.2 - 1.3 mg/dL  59   Albumin 2.2 3.4 - 5.0 g/dL L 59   Protein Total 5.7 6.8 - 8.8 g/dL L 59   Alkaline Phosphatase 300 40 - 150 U/L H 59   ALT 13 0 - 50 U/L  59    0 - 45 U/L H 59            Lactic acid whole blood [[id number]]  Resulted: 06/17/18 0510, Result status: Final result    Ordering provider: Garth Shepard MD  06/17/18 0000 Resulting lab: [facility]    Specimen Information    Type Source Collected On   Blood  06/17/18 0503          Components       Value Reference Range Flag Lab   Lactic Acid 1.3 0.7 - 2.0 mmol/L  59            CBC with platelets [[id number]] (Abnormal)  Resulted: 06/17/18 0509, Result status: Final result    Ordering provider: Garth Shepard MD  06/17/18 0000 Resulting lab: [facility]    Specimen Information    Type Source Collected On   Blood  06/17/18 0503          Components       Value Reference Range Flag Lab   WBC 13.2 4.0 - 11.0 10e9/L H 59   RBC Count 2.07 3.8 - 5.2 10e12/L L 59   Hemoglobin 7.0 11.7 - 15.7 g/dL L 59   Hematocrit 20.1 35.0 - 47.0 % L 59   MCV 97 78 - 100 fl  59   MCH 33.8 26.5 - 33.0 pg H 59   MCHC 34.8 31.5 - 36.5 g/dL  59   RDW 15.5 10.0 - 15.0 % H 59   Platelet Count 219 150 - 450 10e9/L  59            Sodium [[id number]] (Abnormal)  Resulted: 06/16/18 2302, Result status: Final result    Ordering provider: Garth Shepard MD  06/16/18 1600 Resulting lab: [facility]    Specimen Information    Type Source Collected On   Blood  06/16/18 2247          Components       Value Reference Range Flag Lab   Sodium 126 133 - 144 mmol/L L 59            Lactic acid whole blood [[id number]]  (Abnormal)  Resulted: 06/16/18 1745, Result status: Final result    Ordering provider: Garth Shepard MD  06/16/18 1655 Resulting lab: [facility]    Specimen Information    Type Source Collected On   Blood  06/16/18 1720          Components       Value Reference Range Flag Lab   Lactic Acid 2.2 0.7 - 2.0 mmol/L H 59   Comment:         Significant value called to and read back by  VITO SALMERON RN BY  1744 06/16/2018              Sodium [[id number]] (Abnormal)  Resulted: 06/16/18 1430, Result status: Final result    Ordering provider: Garth Shepard MD  06/16/18 0800 Resulting lab: [facility]    Specimen Information    Type Source Collected On   Blood  06/16/18 1411          Components       Value Reference Range Flag Lab   Sodium 124 133 - 144 mmol/L L 59            Basic metabolic panel [[id number]] (Abnormal)  Resulted: 06/16/18 0546, Result status: Final result    Ordering provider: Garth Shepard MD  06/16/18 0000 Resulting lab: [facility]    Specimen Information    Type Source Collected On   Blood  06/16/18 0515          Components       Value Reference Range Flag Lab   Sodium 124 133 - 144 mmol/L L 59   Potassium 3.6 3.4 - 5.3 mmol/L  59   Chloride 88 94 - 109 mmol/L L 59   Carbon Dioxide 26 20 - 32 mmol/L  59   Anion Gap 10 3 - 14 mmol/L  59   Glucose 97 70 - 99 mg/dL  59   Urea Nitrogen 15 7 - 30 mg/dL  59   Creatinine 0.58 0.52 - 1.04 mg/dL  59   GFR Estimate >90 >60 mL/min/1.7m2  59   Comment:  Non  GFR Calc   GFR Estimate If Black >90 >60 mL/min/1.7m2  59   Comment:  African American GFR Calc   Calcium 7.7 8.5 - 10.1 mg/dL L 59            CBC with platelets [[id number]] (Abnormal)  Resulted: 06/16/18 0529, Result status: Final result    Ordering provider: Garth Shepard MD  06/16/18 0000 Resulting lab: [facility]    Specimen Information    Type Source Collected On   Blood  06/16/18 0515           Components       Value Reference Range Flag Lab   WBC 14.1 4.0 - 11.0 10e9/L H 59   RBC Count 2.16 3.8 - 5.2 10e12/L L 59   Hemoglobin 7.2 11.7 - 15.7 g/dL L 59   Hematocrit 20.6 35.0 - 47.0 % L 59   MCV 95 78 - 100 fl  59   MCH 33.3 26.5 - 33.0 pg H 59   MCHC 35.0 31.5 - 36.5 g/dL  59   RDW 15.0 10.0 - 15.0 %  59   Platelet Count 251 150 - 450 10e9/L  59            Sodium [[id number]] (Abnormal)  Resulted: 06/15/18 2319, Result status: Final result    Ordering provider: Garth Shepard MD  06/15/18 1600 Resulting lab: [facility]    Specimen Information    Type Source Collected On   Blood  06/15/18 2304          Components       Value Reference Range Flag Lab   Sodium 124 133 - 144 mmol/L L 59            Lactic acid level STAT for sepsis protocol [[id number]]  Resulted: 06/15/18 1737, Result status: Final result    Ordering provider: Garth Shepard MD  06/15/18 1615 Resulting lab: [facility]    Specimen Information    Type Source Collected On   Blood  06/15/18 1729          Components       Value Reference Range Flag Lab   Lactate for Sepsis Protocol 1.4 0.4 - 1.9 mmol/L  59            Sodium [[id number]] (Abnormal)  Resulted: 06/15/18 1440, Result status: Final result    Ordering provider: Garth Shepard MD  06/15/18 0800 Resulting lab: [facility]    Specimen Information    Type Source Collected On   Blood  06/15/18 1424          Components       Value Reference Range Flag Lab   Sodium 122 133 - 144 mmol/L L 59            Comprehensive metabolic panel [[id number]] (Abnormal)  Resulted: 06/15/18 0546, Result status: Final result    Ordering provider: Garth Shepard MD  06/15/18 0000 Resulting lab: [facility]    Specimen Information    Type Source Collected On   Blood  06/15/18 0515          Components       Value Reference Range Flag Lab   Sodium 119 133 - 144 mmol/L LL 59   Potassium 3.8 3.4 - 5.3 mmol/L  59   Chloride 85 94 -  109 mmol/L L 59   Carbon Dioxide 26 20 - 32 mmol/L  59   Anion Gap 8 3 - 14 mmol/L  59   Glucose 98 70 - 99 mg/dL  59   Urea Nitrogen 14 7 - 30 mg/dL  59   Creatinine 0.60 0.52 - 1.04 mg/dL  59   GFR Estimate >90 >60 mL/min/1.7m2  59   Comment:  Non  GFR Calc   GFR Estimate If Black >90 >60 mL/min/1.7m2  59   Comment:  African American GFR Calc   Calcium 7.8 8.5 - 10.1 mg/dL L 59   Bilirubin Total 1.1 0.2 - 1.3 mg/dL  59   Albumin 2.4 3.4 - 5.0 g/dL L 59   Protein Total 6.0 6.8 - 8.8 g/dL L 59   Alkaline Phosphatase 317 40 - 150 U/L H 59   ALT 15 0 - 50 U/L  59    0 - 45 U/L H 59            Ammonia [[id number]]  Resulted: 06/15/18 0536, Result status: Final result    Ordering provider: Garth Shepard MD  06/15/18 0000 Resulting lab: [facility]    Specimen Information    Type Source Collected On   Blood  06/15/18 0515          Components       Value Reference Range Flag Lab   Ammonia 34 10 - 50 umol/L  59            CBC with platelets [[id number]] (Abnormal)  Resulted: 06/15/18 0521, Result status: Final result    Ordering provider: Garth Shepard MD  06/15/18 0000 Resulting lab: [facility]    Specimen Information    Type Source Collected On   Blood  06/15/18 0515          Components       Value Reference Range Flag Lab   WBC 12.7 4.0 - 11.0 10e9/L H 59   RBC Count 2.19 3.8 - 5.2 10e12/L L 59   Hemoglobin 7.4 11.7 - 15.7 g/dL L 59   Hematocrit 21.0 35.0 - 47.0 % L 59   MCV 96 78 - 100 fl  59   MCH 33.8 26.5 - 33.0 pg H 59   MCHC 35.2 31.5 - 36.5 g/dL  59   RDW 14.9 10.0 - 15.0 %  59   Platelet Count 255 150 - 450 10e9/L  59            Testing Performed By     Lab - Abbreviation Name Director Address Valid Date Range    51 - Unknown [facility] Unknown [address] [facility] [address] 12/31/14 1010 - Present    59 - Unknown [facility] Unknown [address] [address] 12/31/14 1006 -  Present    75 - Unknown [facility] Unknown [address] [facility] [address] 01/15/15 1019 - Present    225 - Unknown INFECTIOUS DISEASE DIAGNOSTIC LABORATORY Unknown [address] [facility] [address] 12/19/14 0954 - Present    226 - Unknown MICRO RAPID TESTING LAB Unknown [address] [facility] [address] 12/19/14 0955 - Present            Unresulted Labs (24h ago through future)    Start       Ordered    06/19/18 0600  Sodium  DAILY,   Routine      06/18/18 0826         Imaging Results - 3 Days      US Abdomen Limited [[id number]]  Resulted: 06/16/18 1042, Result status: Final result    Ordering provider: Garth Shepard MD  06/16/18 0805 Resulted by: Radha Umaña MD    Performed: 06/16/18 0842 - 06/16/18 0902 Resulting lab: RADIOLOGY RESULTS    Narrative:       ULTRASOUND ABDOMEN LIMITED 6/16/2018 9:02 AM     HISTORY: Assess ascites fluid volume.     COMPARISON: None.      Impression:       IMPRESSION: Trace ascites.    RADHA UMAÑA MD      XR Chest 2 Views [[id number]]  Resulted: 06/15/18 0747, Result status: Final result    Ordering provider: Garth Shepard MD  06/15/18 0007 Resulted by: Greg Wallis MD    Performed: 06/15/18 0736 - 06/15/18 0741 Resulting lab: RADIOLOGY RESULTS    Narrative:       XR CHEST 2 VW 6/15/2018 7:41 AM    HISTORY: Short of breath.    COMPARISON: 6/12/2018.      Impression:       IMPRESSION: 2 views of the shoulder show slightly less pulmonary  vascular congestion and better definition of the pulmonary vessels.  This suggests resolving mild interstitial pulmonary edema related to  fluid overload and/or CHF. The small pleural effusion hasn't  significantly changed. Minimal right pleural fluid is also present.    GREG WALLIS MD      Testing Performed By     Lab - Abbreviation Name Director Address Valid Date Range    104 - Rad Rslts RADIOLOGY RESULTS Unknown Unknown 02/16/05 1553 - Present               ECG/EMG  Results      Echocardiogram Complete [[id number]]  Resulted: 18 1334, Result status: Edited Result - FINAL    Ordering provider: Burton Mckeon MD  18 1313 Resulted by: Demetrio Garner MD    Performed: 18 1331 - 18 1355 Resulting lab: RADIOLOGY RESULTS    Narrative:       [id number]  [id number]  [id number]  [id number]^YOSSI^BURTON^BEULAH           [facility]  Echocardiography Laboratory  [address]  [place] [id number]        Name: KAR RINCON  MRN: [id number]  : 1955  Study Date: 2018 01:34 PM  Age: 62 yrs  Gender: Female  Patient Location: [facility]  Reason For Study: YOU  Ordering Physician: BURTON MCKEON  Referring Physician: Tyler Khalil  Performed By: Stacie Kinney     BSA: 1.8 m2  Height: 63 in  Weight: 163 lb  HR: 103  BP: 93/61 mmHg  _____________________________________________________________________________  __        Procedure  Complete Portable Echo Adult. Complete Echo Adult.  _____________________________________________________________________________  __        Interpretation Summary     The visual ejection fraction is estimated at 65-70%.  Normal left ventricular wall motion  The study was technically difficult.  _____________________________________________________________________________  __        Left Ventricle  The left ventricle is normal in structure, function and size. There is normal  left ventricular wall thickness. The visual ejection fraction is estimated at  65-70%. Left ventricular diastolic function is normal. Normal left ventricular  wall motion.     Right Ventricle  The right ventricle is normal in structure, function and size.     Atria  Normal left atrial size. Right atrial size is normal. There is no atrial shunt  seen.     Mitral Valve  The mitral valve is normal in structure and function.        Tricuspid Valve  The tricuspid valve is normal in structure and function.     Aortic Valve  Normal tricuspid aortic  valve.     Pulmonic Valve  The pulmonic valve is normal in structure and function.     Vessels  Normal ascending, transverse (arch), and descending aorta. The IVC is normal  in size and reactivity with respiration, suggesting normal central venous  pressure.     Pericardium  There is no pericardial effusion.        Rhythm  Sinus rhythm was noted.  _____________________________________________________________________________  __  MMode/2D Measurements & Calculations  IVSd: 0.67 cm     LVIDd: 4.1 cm  LVIDs: 1.8 cm  LVPWd: 0.93 cm  FS: 56.1 %  LV mass(C)d: 95.7 grams  LV mass(C)dI: 54.0 grams/m2  Ao root diam: 3.1 cm  LA dimension: 2.9 cm  LA/Ao: 0.95  LA Volume (BP): 32.0 ml  LA Volume Index (BP): 18.1 ml/m2     LA Volume Indexed (AL/bp): 19.4 ml/m2  RWT: 0.46        Doppler Measurements & Calculations  MV E max bryan: 84.1 cm/sec  MV A max bryan: 82.5 cm/sec  MV E/A: 1.0  MV dec time: 0.20 sec  E/E' av.8  Lateral E/e': 8.0  Medial E/e': 9.7              _____________________________________________________________________________  __        Report approved by: Marcela Gonzalez 2018 02:30 PM       1    Type Source Collected On     18 1334          View Image (below)              Encounter-Level Documents:     There are no encounter-level documents.      Order-Level Documents:     There are no order-level documents.

## 2018-06-12 NOTE — IP AVS SNAPSHOT
"[facility] INTENSIVE CARE: [phone]                                              INTERAGENCY TRANSFER FORM - PHYSICIAN ORDERS   2018                    Hospital Admission Date: 2018  KAR RINCON   : 1955  Sex: Female        Attending Provider: Mary Ellen Mazariegos MD     Allergies:  No Known Allergies    Infection:  None   Service:  ICU    Ht:  1.6 m (5' 3\")   Wt:  65.6 kg (144 lb 10 oz)   Admission Wt:  76.2 kg (168 lb)    BMI:  25.62 kg/m 2   BSA:  1.71 m 2            Patient PCP Information     Provider PCP Type    Tyler Khalil MD General      ED Clinical Impression     Diagnosis Description Comment Added By Time Added    Hyponatremia [E87.1] Hyponatremia [E87.1]  Joe Ma MD 2018  1:01 PM      Hospital Problems as of 2018              Priority Class Noted POA    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    Tobacco use disorder Medium  2007 Yes    Hyperlipidemia Medium  10/25/2007 Yes    Inflammatory disease of breast Medium  10/31/2007 Yes    Ascites Medium  2010 Yes    Portal hypertension (H) Medium  2010 Yes    Alcohol dependence (H) Medium  3/9/2012 Yes    Cirrhosis of liver with ascites, unspecified hepatic cirrhosis type (H) Medium  2018 Yes    * (Principal)Hyponatremia Medium  2018 Yes    Abnormal LFTs Medium  2018 Yes    Elevated INR Medium  2018 Yes    Leukocytosis Medium  2018 Yes      Non-Hospital Problems as of 2018              Priority Class Noted    Major depressive disorder, single episode, mild (H) Medium  10/19/2006    Elevated blood pressure reading without diagnosis of hypertension Medium  2007    HYPERLIPIDEMIA LDL GOAL <100 Medium  10/31/2010      Code Status History     Date Active Date Inactive Code Status Order ID Comments User Context    2018 11:57 AM  Full Code [id number]  Mary Ellen Mazariegos MD Outpatient    2018  3:44 PM 2018 11:57 AM Full Code " [id number]  Concepción Palmer PA-C Inpatient         Medication Review      START taking        Dose / Directions Comments    folic acid 1 MG tablet   Commonly known as:  FOLVITE   Used for:  Alcoholic cirrhosis of liver without ascites (H)        Dose:  1 mg   Start taking on:  6/19/2018   Take 1 tablet (1 mg) by mouth daily   Quantity:  30 tablet   Refills:  0        furosemide 40 MG tablet   Commonly known as:  LASIX        Dose:  40 mg   Start taking on:  6/19/2018   Take 1 tablet (40 mg) by mouth daily   Quantity:  30 tablet   Refills:  0        spironolactone 25 MG tablet   Commonly known as:  ALDACTONE   Used for:  Cirrhosis of liver with ascites, unspecified hepatic cirrhosis type (H)        Dose:  25 mg   Start taking on:  6/19/2018   Take 1 tablet (25 mg) by mouth daily   Quantity:  30 tablet   Refills:  0          STOP taking     ibuprofen 200 MG capsule                   After Care     Activity - Up with nursing assistance           Advance Diet as Tolerated       Follow this diet upon discharge:       Daily weights       Call Provider for weight gain of more than 2 pounds per day or 5 pounds per week.       Follow Up (TCM)       Need cbc and BMP daily for 3 days and then as per NH doc.       Follow Up (TCM)       Follow up primary md 1 week       General info for SNF       Length of Stay Estimate: Short Term Care: Estimated # of Days <30  Condition at Discharge: Improving  Level of care:skilled   Rehabilitation Potential: Good  Admission H&P remains valid and up-to-date: Yes  Recent Chemotherapy: N/A  Use Nursing Home Standing Orders: N/A       Mantoux instructions       Give two-step Mantoux (PPD) Per Facility Policy             Referrals     Occupational Therapy Adult Consult       Evaluate and treat as clinically indicated.    Reason:  Weak cirrhosis       Physical Therapy Adult Consult       Evaluate and treat as clinically indicated.    Reason:  Weak, cirrhosis             Statement of Approval      Ordered          06/18/18 1157  I have reviewed and agree with all the recommendations and orders detailed in this document.  EFFECTIVE NOW     Approved and electronically signed by:  Mary Ellen Mazariegos MD

## 2018-06-12 NOTE — ED NOTES
Pt here from Dr. Khalil's office with abnormal labs. The patient has no complaints of discomfort. The patient has large amount of ascites noted to abdomen. The patient is an alcoholic. Reported to have critical low sodium and chloride.

## 2018-06-12 NOTE — PROGRESS NOTES
"[facility] [facility] ADMISSION NOTE    Patient admitted to room 1006 at approximately 1500 via wheel chair from emergency room. Patient was accompanied by nurse.     Verbal SBAR report received from ER RN prior to patient arrival.     Patient ambulated to bed with stand-by assist. Patient alert and oriented X 3. The patient is not having any pain.  . Admission vital signs: Blood pressure 118/58, temperature 98.5  F (36.9  C), temperature source Oral, resp. rate 11, height 1.6 m (5' 3\"), weight 76.2 kg (168 lb), last menstrual period 05/19/2007, SpO2 97 %. Patient was oriented to plan of care, call light, bed controls, tv, telephone, bathroom and visiting hours.     Risk Assessment    The following safety risks were identified during admission: fall, skin and seizure. Yellow risk band applied: YES.     Skin Initial Assessment    This writer admitted this patient and completed a full skin assessment and Osvaldo score in the Adult PCS flowsheet. Appropriate interventions initiated as needed.     Secondary skin check completed by ML Martin.    Skin  Inspection of bony prominences: Full  Inspection under devices: Full  Skin WDL: WDL (dry cracked heels)    Osvaldo Risk Assessment  Sensory Perception: 4-->no impairment  Moisture: 4-->rarely moist  Activity: 3-->walks occasionally  Mobility: 3-->slightly limited  Nutrition: 3-->adequate  Friction and Shear: 3-->no apparent problem  Osvaldo Score: 20    Rhina Mays    "

## 2018-06-12 NOTE — ED PROVIDER NOTES
History     Chief Complaint   Patient presents with     Abnormal Labs     ascites, cirrhosis, sodium 112     HPI  Karen Tee is a 62 year old female with history of alcoholic cirrhosis who presents for abnormal labs.  History obtained from the patient and reviewed the chart.  The patient reports that over the past week she has been dealt developing increased weakness and difficulty ambulating with shortness of breath.  She feels weak all over and unsteady when she walks. Rates her symptoms as severe.  No recent fevers.  She denies abdominal pain, nausea, vomiting, diarrhea.  No chest pain, cough, dysuria, or rash.  No headache and she denies any recent falls.  The patient reports that she is still drinking alcohol daily, and she is trying to reduce her amount.  She does report drinking today but is unsure how much.  She has a history of chronic depression but is not on any medication for this currently.  She has gone through alcohol withdrawal before but has never had a seizure with this.  She was seen in clinic yesterday and had blood drawn, sodium returned at 112 and she was told to come here for further treatment.    Problem List:    Patient Active Problem List    Diagnosis Date Noted     Cirrhosis of liver with ascites, unspecified hepatic cirrhosis type (H) 06/11/2018     Priority: Medium     Alcoholism (H) 03/09/2012     Priority: Medium     Sober since 7-9-10!!!       HYPERLIPIDEMIA LDL GOAL <100 10/31/2010     Priority: Medium     Ascites 08/23/2010     Priority: Medium     Takes Oxycodone, 5mg as needed every six hours; started at [facility], 8-19-10. Using about one per day.        Portal hypertension (H) 08/23/2010     Priority: Medium     Inflammatory disease of breast 10/31/2007     Priority: Medium     Hyperlipidemia 10/25/2007     Priority: Medium     Problem list name updated by automated process. Provider to review       Elevated blood pressure reading without diagnosis of  "hypertension 2007     Priority: Medium     Tobacco use disorder 2007     Priority: Medium     Major depressive disorder, single episode, mild (H) 10/19/2006     Priority: Medium     Chronic depressive personality disorder 2006     Priority: Medium        Past Medical History:    Past Medical History:   Diagnosis Date     Chronic depressive personality disorder      Cirrhoses, liver        Past Surgical History:    No past surgical history on file.    Family History:    Family History   Problem Relation Age of Onset     Hypertension Mother      Depression Sister      and Anxiety.     Psychotic Disorder Sister      severe anxiety     Neurologic Disorder Brother       from brain tumor.       Social History:  Marital Status:   [2]  Social History   Substance Use Topics     Smoking status: Current Every Day Smoker     Packs/day: 0.50     Types: Cigarettes     Smokeless tobacco: Never Used     Alcohol use Yes      Comment: Daily use-unsure of the amount.  (Recently quit 2010.  since hospital admit)        Medications:      ibuprofen 200 MG capsule         Review of Systems  Pertinent positives and negatives listed in the HPI, all other systems reviewed and are negative.    Physical Exam   BP: 92/58  Heart Rate: 116  Temp: 97.8  F (36.6  C)  Resp: 18  Height: 160 cm (5' 3\")  Weight: 76.2 kg (168 lb)  SpO2: 96 %      Physical Exam   Constitutional: She is oriented to person, place, and time. She appears well-developed and well-nourished. She appears distressed.   HENT:   Head: Normocephalic and atraumatic.   Right Ear: External ear normal.   Left Ear: External ear normal.   Nose: Nose normal.   Eyes: Conjunctivae are normal. No scleral icterus.   Neck: Normal range of motion.   Cardiovascular: Normal rate and regular rhythm.    No murmur heard.  Pulmonary/Chest: Effort normal. No stridor. No respiratory distress. She has no wheezes. She has no rales.   Abdominal: Soft. She exhibits " distension, fluid wave and ascites. There is no tenderness. There is no rigidity and no guarding.   Musculoskeletal: She exhibits edema (Bilateral lower extremity edema extending to the mid calf).   Neurological: She is alert and oriented to person, place, and time.   Skin: Skin is warm and dry. She is not diaphoretic. There is pallor.   Psychiatric: She has a normal mood and affect. Her behavior is normal.   Nursing note and vitals reviewed.      ED Course     ED Course     Procedures               Critical Care time:  none               Results for orders placed or performed during the hospital encounter of 06/12/18 (from the past 24 hour(s))   CBC with platelets differential   Result Value Ref Range    WBC 16.5 (H) 4.0 - 11.0 10e9/L    RBC Count 2.52 (L) 3.8 - 5.2 10e12/L    Hemoglobin 8.3 (L) 11.7 - 15.7 g/dL    Hematocrit 24.1 (L) 35.0 - 47.0 %    MCV 96 78 - 100 fl    MCH 32.9 26.5 - 33.0 pg    MCHC 34.4 31.5 - 36.5 g/dL    RDW 14.6 10.0 - 15.0 %    Platelet Count 372 150 - 450 10e9/L    Diff Method Automated Method     % Neutrophils 84.2 %    % Lymphocytes 9.1 %    % Monocytes 4.3 %    % Eosinophils 0.1 %    % Basophils 0.1 %    % Immature Granulocytes 2.2 %    Nucleated RBCs 1 (H) 0 /100    Absolute Neutrophil 13.9 (H) 1.6 - 8.3 10e9/L    Absolute Lymphocytes 1.5 0.8 - 5.3 10e9/L    Absolute Monocytes 0.7 0.0 - 1.3 10e9/L    Absolute Eosinophils 0.0 0.0 - 0.7 10e9/L    Absolute Basophils 0.0 0.0 - 0.2 10e9/L    Abs Immature Granulocytes 0.4 0 - 0.4 10e9/L    Absolute Nucleated RBC 0.1    Comprehensive metabolic panel   Result Value Ref Range    Sodium 113 (LL) 133 - 144 mmol/L    Potassium 3.6 3.4 - 5.3 mmol/L    Chloride 78 (L) 94 - 109 mmol/L    Carbon Dioxide 20 20 - 32 mmol/L    Anion Gap 15 (H) 3 - 14 mmol/L    Glucose 104 (H) 70 - 99 mg/dL    Urea Nitrogen 15 7 - 30 mg/dL    Creatinine 0.57 0.52 - 1.04 mg/dL    GFR Estimate >90 >60 mL/min/1.7m2    GFR Estimate If Black >90 >60 mL/min/1.7m2    Calcium  8.6 8.5 - 10.1 mg/dL    Bilirubin Total 1.6 (H) 0.2 - 1.3 mg/dL    Albumin 2.4 (L) 3.4 - 5.0 g/dL    Protein Total 7.2 6.8 - 8.8 g/dL    Alkaline Phosphatase 415 (H) 40 - 150 U/L    ALT 20 0 - 50 U/L     (H) 0 - 45 U/L   TSH with free T4 reflex   Result Value Ref Range    TSH 2.95 0.40 - 4.00 mU/L   Uric acid   Result Value Ref Range    Uric Acid 8.2 (H) 2.6 - 6.0 mg/dL   Ammonia (on ice)   Result Value Ref Range    Ammonia 25 10 - 50 umol/L       Medications   0.9% sodium chloride BOLUS (1,000 mLs Intravenous New Bag 6/12/18 1050)       Assessments & Plan (with Medical Decision Making)   62-year-old female presents for hyponatremia from clinic.  Temperature is 36.6 C, blood pressure 92/58, on repeat during my examination was 116/69.  Heart rate 116, SPO2 is 96% on room air.  Blood tests are drawn and the sodium level is confirmed at 113.  She does have edema peripherally in ascites, this is likely related to her advanced cirrhosis with hypoalbuminemia causing peripheral edema.  She appears to be euvolemic versus slightly hypovolemic.  Unfortunately prior to my evaluation the patient was given 800 cc of normal saline.  I stopped this immediately upon my evaluation of the patient.  TSH is normal, no signs of thyroid disease as a cause.  Ammonia is normal, less likely hepatic encephalopathy causing any confusion or weakness.  Chest x-ray obtained, images reviewed independently, positive for small left pleural effusion, no signs of infiltrate to suggest pneumonia.  Effusion is likely secondary to her alcoholic cirrhosis.  White blood cell count is elevated 16.5, hemoglobin 8.3 and platelets are normal at 372.  The patient does not seem to be actively withdrawing at this time, however she is high risk for going through alcohol withdrawal during this hospitalization.  She does require hospitalization for slow correction of her hyponatremia.  Her sodium is rechecked and is 115, no significant overly rapid  improvement.  I have discussed the case with the on-call hospitalist, Dr. Mckeon who agrees to accept the patient to the ICU here.    I have reviewed the nursing notes.    I have reviewed the findings, diagnosis, plan and need for follow up with the patient.       New Prescriptions    No medications on file       Final diagnoses:   Hyponatremia       6/12/2018   [facility] EMERGENCY DEPARTMENT     Joe Ma MD  06/12/18 1737

## 2018-06-12 NOTE — IP AVS SNAPSHOT
` `     [facility] INTENSIVE CARE: [phone]                 INTERAGENCY TRANSFER FORM - NOTES (H&P, Discharge Summary, Consults, Procedures, Therapies)   2018                    Hospital Admission Date: 2018  KAR RINCON   : 1955  Sex: Female        Patient PCP Information     Provider PCP Type    Tyler Khalil MD General         History & Physicals      H&P by Concepción Palmer PA-C at 2018  2:50 PM     Author:  Concepción Palmer PA-C Service:  Hospitalist Author Type:  Physician Assistant - JARON    Filed:  2018  3:46 PM Date of Service:  2018  2:50 PM Creation Time:  2018  2:50 PM    Status:  Attested :  Concepción Palmer PA-C (Physician Assistant - JARON)    Cosigner:  Moy Mckeon MD at 2018  8:27 PM        Attestation signed by Moy Mckeon MD at 2018  8:27 PM        [facility]    Attestation Addendum to H&P  Hospital Medicine  Date of Service: 2018          The patient was seen today in a shared visit with DEBORA Espinoza. Assessment and plan was discussed, and I participated in decision making.     On exam, protuberant abdomen without tenderness. 1-2+ bilateral lower extremity edema below the knee. Nontoxic appearing.   A/P   Severe hyponatremia with weakness. Hyponatremia likely related to cirrhosis. Started diuretic and fluid restriction. If no significant improvement, may need some 3% but carefully.    Cirrhosis with ascites. H/o prior paracentesis but back in . No pain. Follow with diuretics tonight, but may need to consider paracentesis if not responding.    Leucocytosis. No localizing signs or symptoms of infection. May be due to cirrhosis.   Moy Mckeno MD  Hospital Medicine                                [facility]    History and Physical  Hospital Medicine       Date of Admission:  2018  Date of Service: 2018     CC:  Unsteady gait, shortness of breath, abdominal bloating    Assessment & Plan   Karen Tee is a 62 year old female with a past medical history significant for alcohol dependence, cirrhosis with ascites, portal hypertension, dyslipidemia, depression, and tobacco dependence who presents on 6/12/2018 with unsteady gait, shortness of breath, abdominal bloating, and hyponatremia.      Hyponatremia  Went to clinic on 6/11/18 presenting with unsteady gait and shortness of breath, labs collected and sodium returned 112 after patient left clinic. Was called and told to go to emergency department, presented with sodium 113. Was given about 800cc's NS in emergency department, recheck sodium up to 115. Hyponatremia is secondary to fluid overload and worsening ascites (see below).  - Sodium checks every 6 hours (time lab draws for midnight, 6 am)  - Correct sodium via diuresis (see below)  - Needs reversal protocol if sodium corrects > 8 mmol/L in 24 hours - page Hospitalist if sodium increases by more than 4 mmol/L between lab rechecks  - Neuro checks  - Admit to ICU   - Sodium / urine studies ordered by emergency department (urine osmolality, sodium urine, serum osmolality) and cortisol      Cirrhosis of liver with ascites, unspecified hepatic cirrhosis type (H)  Portal hypertension (H)  Chronic alcohol use, history of ascites in the past requiring paracentesis (most recent paracentesis in approximately 2010). Noted abdominal distention for the past 2-3 weeks. Presents with multiple LFT abnormalities (see below). Ammonia normal at 25. Positive fluid wave on exam. Non-tender, denies pain. Possible spontaneous bacterial peritonitis due to leukocytosis (see below), although afebrile and no pain.  - Diureses with IV Lasix 20 mg now, at 8 pm, and again in the morning  - Start spironolactone 25 mg  - Fluid restriction 1500 mL fluid/daily  - No paracentesis at this time, reevaluate tomorrow      Leukocytosis  Presented with WBC  16.5, afebrile. Chest x-ray negative for infiltrate, but does show evidence of fluid overload. No abdominal pain or tenderness, so low suspicion for spontaneous bacterial peritonitis at this time.  - No antibiotics   - Recheck CBC in am       Abnormal LFTs  Presented with total bilirubin 1.6, alkaline phos 415, ALT 20, . All likely related to patient's cirrhosis and chronic alcohol use.   - Address ascites and monitor daily CMP      Elevated INR  Presented with INR 1.45. Not on anticoagulation. Secondary to patient's liver disease due to chronic alcohol use.      Elevated uric acid  Presented with uric acid 8.2. No current evidence for gout.       Alcohol dependence  Daily alcohol use, at least 6-7 oz jes per day. No known history of seizures. Risk for withdrawal during current hospitalization.  - CIWA protocol  - Prn Ativan use for withdrawal symptoms      Tobacco use disorder  Smokes about 1/2 pack per day. Encourage cessation.  - Nicotine gum available      Chronic depressive personality disorder  Previously diagnosed. No longer taking any medications for depression. Defer to PCP for outpatient management.      Hyperlipidemia  Noted per problem list. Not currently taking any statin or other lipid lowering medication. Defer to PCP for outpatient management.        Fluids: Oral, fluid restriction  Electrolytes: Monitor  Nutrition: Regular diet    DVT Prophylaxis: Pneumatic Compression Devices  Code Status: Full Code - discussed directly with patient    Lines: Peripheral  Palm catheter: Not indicated    Disposition: Anticipate discharge in 2+ day(s). Appropriate for inpatient care.    Discussion: Assessment and plan discussed with Dr. Mckeon.    Concepción Palmer PA-C  [facility]  Pager: [phone]          Primary Care Physician   Tyler Khalil [phone]    History is obtained from the patient and review of old records via the EMR.     Past Medical History      Past  "Medical History:   Diagnosis Date     Alcohol dependence (H)      Chronic depressive personality disorder      Cirrhoses, liver     diagnosed 7/10     Tobacco dependence          Diagnosis Date Noted     Priority: Medium     Alcohol dependence (H) 03/09/2012     Priority: Medium     Quit in 2010 but started drinking again.       HYPERLIPIDEMIA LDL GOAL <100 10/31/2010     Priority: Medium     Ascites 08/23/2010     Priority: Medium     Takes Oxycodone, 5mg as needed every six hours; started at [facility], 8-19-10. Using about one per day.        Portal hypertension (H) 08/23/2010     Priority: Medium     Inflammatory disease of breast 10/31/2007     Priority: Medium     Hyperlipidemia 10/25/2007     Priority: Medium     Problem list name updated by automated process. Provider to review       Elevated blood pressure reading without diagnosis of hypertension 09/25/2007     Priority: Medium     Tobacco use disorder 09/25/2007     Priority: Medium     Major depressive disorder, single episode, mild (H) 10/19/2006     Priority: Medium     Chronic depressive personality disorder 01/20/2006     Priority: Medium        Past Surgical History     Past Surgical History:   Procedure Laterality Date     HC EXCISION BREAST LESION, OPEN >=1          History of Present Illness   Karen Tee is a 62 year old female with the above past medical history now presents on 6/12/2018 with abdominal distention, gait instability, and shortness of breath. Abdominal \"bloating\" started about 3 weeks ago and has been getting progressively worse. She denies any associated abdominal pain or tenderness, no nausea or vomiting. Over the past few weeks she has also noticed that she has been feeling unsteady on her feet and uncoordinated. She denies any dizziness, falls, or syncopal episodes, but just has difficulty with ambulation because she is so unsteady. She develops dyspnea with exertion as well, which is new in the past few " weeks. She denies any associated cough or wheezing.    Yesterday she went to see her PCP regarding her abdominal distention. She was referred to have an outpatient paracentesis if she develops abdominal pain. Labs were checked and patient went home. Lab results returned after patient had already left the clinic, and her sodium returned at 112. She was called and advised to go to the emergency department for evaluation and treatment of hyponatremia. She presented to the emergency department on[KW1.1] 2018[KW1.2] and sodium was still low at 113. Prior to being evaluated by anyone she was given about 800 cc's of NS. Recheck of her sodium was 115.    On review of systems patient is fatigued but unable to sleep well. She has a decreased appetite and is not eating much. She has some bilateral lower extremity edema. She has chronic back pain but no new or acute pains. She is very unsteady on her feet and feels generally weak. She notes a recent weight increase, approximately 40 pounds. Patient denies any recent fever, chills, recent illness, chest pain, palpitations, cough, wheeze, abdominal pain, nausea, vomiting, diarrhea, constipation, melena, hematochezia, dysuria or urinary changes, skin changes or rash, new pain, headache, vision changes, dizziness, falls, syncope, numbness, tingling, confusion, or slurred speech.        Prior to Admission Medications   Prior to Admission Medications   Prescriptions Last Dose Informant Patient Reported? Taking?   ibuprofen 200 MG capsule Past Week at Unknown time Self Yes Yes   Si mg every 4 hours as needed Taking as needed for pain.      Facility-Administered Medications: None     Allergies   No Known Allergies    Family History    Family History   Problem Relation Age of Onset     Hypertension Mother      Depression Sister      and Anxiety.     Psychotic Disorder Sister      severe anxiety     Neurologic Disorder Brother       from brain tumor.       Social History  "  Social History     Social History     Marital status:      Spouse name: N/A     Number of children: N/A     Years of education: N/A     Occupational History     Not on file.     Social History Main Topics     Smoking status: Current Every Day Smoker     Packs/day: 0.50     Types: Cigarettes     Smokeless tobacco: Never Used     Alcohol use Yes      Comment: Quit in 2010 but started drinking again. Currently drinks approx 7 oz jes daily     Drug use: No      Comment: hx of alcohol abuse.     Sexual activity: No     Other Topics Concern     Parent/Sibling W/ Cabg, Mi Or Angioplasty Before 65f 55m? Yes     Social History Narrative       Review of Systems     A complete 10 point review of systems was negative except for items noted in the HPI/subjective.    Physical Exam   BP 98/58  Temp 97.8  F (36.6  C) (Oral)  Resp 20  Ht 1.6 m (5' 3\")  Wt 76.2 kg (168 lb)  LMP 05/19/2007  SpO2 99%  BMI 29.76 kg/m2     Weight: 168 lbs 0 oz Body mass index is 29.76 kg/(m^2).     Constitutional: Slightly lethargic but alert, oriented, cooperative, no apparent distress, appears nontoxic, speaking in full sentences.     Eyes: Eyes are clear, pupils are reactive. No scleral icterus. Extroccular movements intact.     HEENT: Oropharynx is clear and moist, no lesions. Normocephalic, no evidence of cranial trauma.      Cardiovascular: Regular rhythm and rate, normal S1 and S2. No murmur, rubs, or gallops. Peripheral pulses in tact bilaterally. Bilateral +2 pitting lower extremity edema.    Respiratory: Lung sounds are clear to auscultation bilaterally without wheezes, rhonchi, or crackles.    GI: Profoundly distended. Non-tender to palpation, no rebound or guarding. No hepatosplenomegaly or masses appreciated, although distention makes palpation difficult. Normal bowel sounds. Negative cutaneous hyperesthesia. Positive fluid wave test.    Musculoskeletal: Without obvious deformity, normal range of motion. Normal muscle bulk " and tone.     Skin: Warm and dry, no rashes or ecchymoses. No mottling of skin.      Neurologic: Patient moves all extremities. Cranial nerves are grossly intact.  is symmetric. Gross strength and sensation are equal bilaterally.    Genitourinary: Deferred    Data   Data reviewed today:     Recent Labs  Lab 06/12/18  1220 06/12/18  1036 06/11/18  1424   WBC  --  16.5* 17.6*   HGB  --  8.3* 8.5*   MCV  --  96 97   PLT  --  372 380   INR  --   --  1.45*   * 113* 112*   POTASSIUM  --  3.6 3.7   CHLORIDE  --  78* 78*   CO2  --  20 16*   BUN  --  15 13   CR  --  0.57 0.50*   ANIONGAP  --  15* 18*   LYN  --  8.6 8.5   GLC  --  104* 113*   ALBUMIN  --  2.4* 2.3*   PROTTOTAL  --  7.2 7.0   BILITOTAL  --  1.6* 1.5*   ALKPHOS  --  415* 412*   ALT  --  20 20   AST  --  148* 165*       Recent Results (from the past 24 hour(s))   Chest XR,  PA & LAT    Narrative    XR CHEST 2 VW 6/12/2018 1:27 PM    HISTORY: Short of breath. Weakness.    COMPARISON: 7/9/2010.      Impression    IMPRESSION: 2 views of the chest show low lung volumes. In addition,  there is a new small left pleural effusion. Pulmonary vessels are  mildly congested and slightly ill-defined with a small mild  peribronchial thickening. This raises the possibility of a small  amount interstitial pulmonary edema related to CHF.     GREG WALLIS MD       I personally reviewed the chest CT image(s) showing pulmonary edema.    Concepción Palmer PA-C  [facility]  Pager: [phone][KW1.1]        Revision History        User Key Date/Time User Provider Type Action    > KW1.2 6/12/2018  3:46 PM Concepción Palmer PA-C Physician Assistant Sylvia SALMERON Sign     KW1.1 6/12/2018  2:50 PM Concepción Palmer PA-C Physician Assistant - C                      Discharge Summaries      Discharge Summaries by Mary Ellen Mazariegos MD at 6/18/2018 11:58 AM     Author:  Mary Ellen Mazariegos MD Service:  Hospitalist Author Type:  Physician    Filed:  6/18/2018 12:08 PM  Date of Service:  6/18/2018 11:58 AM Creation Time:  6/18/2018 11:58 AM    Status:  Signed :  Mary Ellen Mazariegos MD (Physician)         [facility]    Discharge Summary  Hospital Medicine    Date of Admission:  6/12/2018  Date of Discharge:  6/18/2018   Discharging Provider: Mary Ellen Mazariegos  Date of Service: 6/18/2018     Primary Care     Tyler Khalil  [address] [facility] [address]      Assessment & Plan      Karen Tee is a 62 year old female with a past medical history significant for alcohol dependence, cirrhosis/alcohol hepatitis with ascites 2010, dyslipidemia, depression, and tobacco dependence who presents on 6/12/2018 with unsteady gait, shortness of breath, abdominal bloating and hyponatremia.          Hyponatremia, severe- improved to 130 from 112. Responded to fluid restriction and diuretics and large vol paracentesis        cirrhosis given alcohol history and marked ascites   Presented July 2010 with hyponatremia 124, marked ascites, hgb 5.1 and had been drinking heavily. CT 2010 showed hepatomegaly with fatty infiltration, EGD showed no varices, hepatitis A/B/C serologies negative and had Hep A and Hep B vaccine series following that. Seen by Dr. Adonay Glover, Hepatology, at that time. Patient required multiple large volume paracentesis July to November 2010. Patient stopped alcohol in July and after November, needed no further paracentesis. Patient did treatment at [facility] and was dry 5 years . Started drinking again about 3 years ago.      Noted abdominal distention for the past 2-3 weeks. Protuberant, fluid-filled abdomen on exam but non-tender. LFT's show alcohol hepatitis pattern, decreased albumin 2.0 and Tprotein 5.8, INR 1.45 but normal platelets. Leucocytosis otherwise nothing to indicate SBP.    Discussed with hepatology  6/13, -->paracentesis of 8 liters : RUQ US with doppler no portal vein thrombosis.  .   - no evidence of portal clots, but  "severe portal hypertension, fatty liver all point to cirrhosis.  Will need lasix and wilmar to prevent recurrence of ascites.  No SBP.    - lasix 40 daily .  spironolactone 25     -  Cognitive impairment:   -may be some alcohol dementia, but unclear how much is acute vs chronic .  SLUMs 19.  Is really unsafe for independent living.  Will need TCU at least for a while.    -some improvement and I expect some gradual improvement as her electrolyte status improves and she is further from her alcohol use.    -SLUMs up to 23   NH3- normal on 2 occasions           Pulmonary vascular congestion:  -CXR suggests some vascular congestion.  Better with lasix.  Unclear if really CHF or just diffuse volume overload from cirrhosis.        Leukocytosis  Presented with WBC 16.5, afebrile. Chest x-ray negative for infiltrate, but does show evidence of fluid overload. No abdominal pain or tenderness, so low suspicion for spontaneous bacterial peritonitis at this time.peritoneal cx neg. WBC 13.5 now  - No antibiotics        Anemia   Likely due to chronic alcohol use . Hgb 7.3. No evidence of bleeding. fe sats 65%, b12- 679, folate 7.8   - finally hit low of 7.0. Transfuse 1 unit 6/17/2018.       Elevated uric acid  Presented with uric acid 8.2. No current evidence for gout.       Alcohol dependence  Daily alcohol use, at least 6-7 oz jes per day. No known history of seizures. Risk for withdrawal during current hospitalization. 1.75 liter lasts \"nearly a week\". Last drink 6 days ago. No evidence of withdrawal in the hospital.      - thiamine high dose x 5 days   - folic acid      Tobacco use disorder  Smokes about 1/2 pack per day. Encourage cessation.  - Nicotine gum in hospital      Chronic depressive personality disorder  Previously diagnosed. No longer taking any medications for depression. Defer to PCP for outpatient management.      Hyperlipidemia  Noted per problem list. Not currently taking any statin or other lipid lowering " medication. Defer to PCP for outpatient management.            Electrolytes: Monitor  Nutrition: Regular diet, 1200 cc fluid restriction  Code Status: Full Code  Palm catheter: Not indicated        Discharge Disposition   To tcu    Discharge Orders     General info for SNF   Length of Stay Estimate: Short Term Care: Estimated # of Days <30  Condition at Discharge: Improving  Level of care:skilled   Rehabilitation Potential: Good  Admission H&P remains valid and up-to-date: Yes  Recent Chemotherapy: N/A  Use Nursing Home Standing Orders: N/A     Mantoux instructions   Give two-step Mantoux (PPD) Per Facility Policy     Daily weights   Call Provider for weight gain of more than 2 pounds per day or 5 pounds per week.     Activity - Up with nursing assistance     Follow Up (TCM)   Need cbc and BMP daily for 3 days and then as per NH doc.     Follow Up (TCM)   Follow up primary md 1 week     Full Code     Physical Therapy Adult Consult   Evaluate and treat as clinically indicated.    Reason:  Weak, cirrhosis     Occupational Therapy Adult Consult   Evaluate and treat as clinically indicated.    Reason:  Weak cirrhosis     Advance Diet as Tolerated   Follow this diet upon discharge:     1200 liter fluid restriction      Discharge Medications   Current Discharge Medication List      START taking these medications    Details   folic acid (FOLVITE) 1 MG tablet Take 1 tablet (1 mg) by mouth daily  Qty: 30 tablet    Associated Diagnoses: Alcoholic cirrhosis of liver without ascites (H)      furosemide (LASIX) 40 MG tablet Take 1 tablet (40 mg) by mouth daily  Qty: 30 tablet    Associated Diagnoses: Hyponatremia      spironolactone (ALDACTONE) 25 MG tablet Take 1 tablet (25 mg) by mouth daily  Qty: 30 tablet    Associated Diagnoses: Cirrhosis of liver with ascites, unspecified hepatic cirrhosis type (H)         STOP taking these medications       ibuprofen 200 MG capsule Comments:   Reason for Stopping:             Allergies    No Known Allergies    Consultations This Hospital Stay     PHYSICAL THERAPY ADULT IP CONSULT  OCCUPATIONAL THERAPY ADULT IP CONSULT  CARE TRANSITION RN/SW IP CONSULT  PHYSICAL THERAPY ADULT IP CONSULT  OCCUPATIONAL THERAPY ADULT IP CONSULT    Significant Results and Procedures   Procedures    8 liter paracentesis    Blood transfusion, 1 unit    Data   Results for orders placed or performed during the hospital encounter of 06/12/18   Chest XR,  PA & LAT    Narrative    XR CHEST 2 VW 6/12/2018 1:27 PM    HISTORY: Short of breath. Weakness.    COMPARISON: 7/9/2010.      Impression    IMPRESSION: 2 views of the chest show low lung volumes. In addition,  there is a new small left pleural effusion. Pulmonary vessels are  mildly congested and slightly ill-defined with a small mild  peribronchial thickening. This raises the possibility of a small  amount interstitial pulmonary edema related to CHF.     GREG WALLIS MD   US Paracentesis    Narrative    US PARACENTESIS 6/13/2018 3:26 PM    HISTORY: Liver disease. Distention.    ULTRASOUND GUIDED PARACENTESIS:  Risks and benefits of the procedure  are discussed with the patient. Under sonographic guidance and  utilizing 8 mL 1% lidocaine as local anesthetic, a needle is inserted  into the left lower quadrant of the abdomen. This is followed by  placement of a standard 8 Serbian catheter. 8,000 mL of straw-colored  fluid is evacuated. The patient tolerated the procedure well.      Impression    IMPRESSION: Technically uneventful ultrasound-guided paracentesis, as  described above.    GREG WALLIS MD    Abddomen Limited w Abd/Pelvis Duplex Complete    Narrative    ULTRASOUND ABDOMEN RIGHT UPPER QUADRANT WITH LIVER DOPPLER  6/13/2018  10:33 PM     HISTORY: Recurrent ascites.    COMPARISON: None.    TECHNIQUE: Spectral waveform and color Doppler evaluation were  performed.    FINDINGS: Mild to moderate diffuse increased hepatic echogenicity,  likely representing fatty  infiltration. No hepatic masses. A few small  gallstones are present in a nondistended gallbladder. No convincing  gallbladder wall thickening and no focal tenderness over the  gallbladder. No intra- or extrahepatic biliary dilatation. The common  duct measures between 0.6 and 0.7 cm in diameter, at the upper limits  of normal in caliber. The right kidney has normal size and  echogenicity, measuring 10.9 cm in length. No right intrarenal  collecting system dilatation, calculi or masses. A small amount of  perihepatic free fluid.    Hepatofugal blood flow is visualized within the main, left and right  portal veins. Unremarkable Doppler waveform evaluation of the main  hepatic artery and left and middle hepatic veins. The right hepatic  vein is not visualized. Relatively high velocity within the main  hepatic artery with a peak systolic velocity of 190 cm/s.      Impression    IMPRESSION:  1. Hepatofugal blood flow within the portal veins, consistent with  portal hypertension.  2. Mild to moderate diffuse fatty infiltration of the liver.   3. Cholelithiasis.  4. No biliary dilatation.  5. A small amount of perihepatic free fluid.    RONALD SALAZAR MD   XR Chest 2 Views    Narrative    XR CHEST 2 VW 6/15/2018 7:41 AM    HISTORY: Short of breath.    COMPARISON: 6/12/2018.      Impression    IMPRESSION: 2 views of the shoulder show slightly less pulmonary  vascular congestion and better definition of the pulmonary vessels.  This suggests resolving mild interstitial pulmonary edema related to  fluid overload and/or CHF. The small pleural effusion hasn't  significantly changed. Minimal right pleural fluid is also present.    GREG WALLIS MD   US Abdomen Limited    Narrative    ULTRASOUND ABDOMEN LIMITED 6/16/2018 9:02 AM     HISTORY: Assess ascites fluid volume.     COMPARISON: None.      Impression    IMPRESSION: Trace ascites.    RADHA UMAÑA MD       Pending Results   Unresulted Labs Ordered in the Past 30 Days of  this Admission     Date and Time Order Name Status Description    6/16/2018 1818 Blood culture Preliminary     6/16/2018 1818 Blood culture Preliminary     6/13/2018 1307 Anaerobic bacterial culture Preliminary           Physical Exam   Temp:  [97  F (36.1  C)-98.9  F (37.2  C)] 98.2  F (36.8  C)  Pulse:  [] 96  Heart Rate:  [102] 102  Resp:  [16-18] 18  BP: (87-96)/(53-58) 95/58  SpO2:  [96 %-97 %] 96 %  Vitals:    06/16/18 0446 06/17/18 0600 06/18/18 0317   Weight: 144 lb 13.5 oz (65.7 kg) 145 lb 8.1 oz (66 kg) 144 lb 10 oz (65.6 kg)       Constitutional: nad  CV: Regular  Respiratory: CTA bilaterally  GI: Soft, nontender,+bs  Skin: Warm and dry, no edema      The discharge plan was discussed with the patient     Total time on this discharge was 40 min.    Mary Ellen Mazariegos[JM1.1]               Revision History        User Key Date/Time User Provider Type Action    > JM1.1 6/18/2018 12:08 PM Mary Ellen Mazariegos MD Physician Sign                     Consult Notes      Consults by Susie Alvarado at 6/14/2018  1:46 PM     Author:  Susie Alvarado Service:  (none) Author Type:      Filed:  6/14/2018  1:46 PM Date of Service:  6/14/2018  1:46 PM Creation Time:  6/14/2018  1:39 PM    Status:  Signed :  Susie Alvarado ()     Consult Orders:    1. Care Transition RN/SW IP Consult [[id number]] ordered by Garth Shepard MD at 06/14/18 1235                CARE TRANSITION SOCIAL WORK INITIAL ASSESSMENT:  Reason For Consult: discharge planning   Met with: Patient.    DATA  Principal Problem:    Hyponatremia  Active Problems:    Chronic depressive personality disorder    Tobacco use disorder    Hyperlipidemia    Inflammatory disease of breast    Ascites    Portal hypertension (H)    Alcohol dependence (H)    Cirrhosis of liver with ascites, unspecified hepatic cirrhosis type (H)    Abnormal LFTs    Elevated INR    Leukocytosis       [place] [facility] [place] MD Name:   [place]  Contact information and PCP information verified: Yes    ASSESSMENT  Cognitive Status: awake, alert and oriented.        Lives With: alone  Living Arrangements: apartment         Who is your support system?: Sibling(s)       Insurance Concerns: No Insurance issues identified    This writer met with pt introduced self and role. Discussed discharge planning and medicare guidelines in regards to home care and SNF benefits. Patient states she lives alone in an apartment.  She states she has relatives and friends nearby to help out if she needs it.  Patient states that prior to hospitalization, she was independent with all ADLs.  Patient still drives.  She does not receive any in-home services.  She does not use an assistive device for mobility at baseline.  Patient admits that she is more weak than usual.  Awaiting P/T eval and Cognitive Eval to assist with disposition.  Patient states she would not have any family or friends that would be able to stay with her at discharge.  She states she will need a walker.  Discussed options for discharge including home care and TCU.  Patient is in agreement with sending TCU referrals in case she is not able to return home.  Patient's choices are as follows:  [facility] Phone: ([phone]) Fax: ([phone]), [place] [facility] (Phone: [phone] Fax: [phone]), [facility] (Main: [phone] Admissions: [phone] Fax: [phone]), [facility] (Phone: [phone] Admissions: [phone] Fax: [phone]).      PLAN    TCU vs Home Care    Discharge Planner   Discharge Plans in progress: TBD  Barriers to discharge plan: Unknown  Follow up plan: PCP    LUTHER Garzon  [facility] [phone]/ Lisa [phone]         Entered by: Susie Alvarado 06/14/2018 1:39 PM[JK1.1]              Revision History        User Key Date/Time User Provider Type Action    > JK1.1 6/14/2018  1:46 PM Susie Alvarado  Sign                      Progress Notes - Physician (Notes from 06/15/18 through 06/18/18)      Progress Notes by Trinity Rodgers LICSW at 6/18/2018  1:41 PM     Author:  Trinity Rodgers LICSW Service:  (none) Author Type:      Filed:  6/18/2018  1:43 PM Date of Service:  6/18/2018  1:41 PM Creation Time:  6/18/2018  1:41 PM    Status:  Signed :  Trinity Rodgers LICSW ()         Name: Karen Tee    MRN#: [id number]    Reason for Hospitalization: Hyponatremia [E87.1]    Discharge Date: 6/18/2018    Patient / Family response to discharge plan: Pt accepted at both [facility] Phone: (Admissions: [phone] RN Report: [phone] Fax: [phone])  And [facility] (Phone: [phone] Fax: [phone]). Pt chooses [facility]. Pt will dc there today.     Other Providers (Care Coordinator, County Services, PCA services etc): No    CTS Hand Off Completed: Yes: completed    OZZIE Score: low    Future Appointments: No future appointments.    Discharge Disposition: transitional care unit    Trinity Rodgers [facility] [place], [facility] [phone][AK1.1]         Revision History        User Key Date/Time User Provider Type Action    > AK1.1 6/18/2018  1:43 PM Trinity Rodgers LICSW  Sign            Progress Notes by Lynne Rainey RN at 6/17/2018  5:08 PM     Author:  Lynne Rainey RN Service:  (none) Author Type:  Registered Nurse    Filed:  6/17/2018  6:52 PM Date of Service:  6/17/2018  5:08 PM Creation Time:  6/17/2018  5:08 PM    Status:  Signed :  Lynne Rainey RN (Registered Nurse)         Patient tolerated transfusion  prbc , patient is ambulatory in room mainly to bathroom . Encourage frequently[JL1.1]. B/P still running on low end but has been stable 90/50's. Patient mentation clear. Anticipates discharge to TCU tomorrow.[JL1.2]      Revision History        User Key Date/Time User Provider Type Action    > JL1.2 6/17/2018  6:52 PM Lynne Rainey,  "RN Registered Nurse Sign     JL1.1 6/17/2018  5:08 PM Lynne Rainey RN Registered Nurse             Progress Notes by Garth Shepard MD at 6/17/2018  7:57 AM     Author:  Garth Shepard MD Service:  (none) Author Type:  Physician    Filed:  6/17/2018  2:49 PM Date of Service:  6/17/2018  7:57 AM Creation Time:  6/17/2018  7:57 AM    Status:  Addendum :  Garth Shepard MD (Physician)         SUBJECTIVE:   Feels stronger, eating better  No increase in abd distension.   Still weak, needs help to move about.       ROS:4 point ROS including Respiratory, CV, GI and , other than that noted in the HPI,  is negative     OBJECTIVE:   BP 96/54 (BP Location: Right arm)  Pulse 94  Temp 97.4  F (36.3  C) (Oral)  Resp 16  Ht 1.6 m (5' 3\")  Wt 66 kg (145 lb 8.1 oz)  LMP 05/19/2007  SpO2 99%  BMI 25.77 kg/m2    GENERAL APPEARANCE:  Awake, Ox4     RESP:clear      CV: regular rate and rhythm,  No  murmur , edema: none       Abdomen: soft, nontender, no liver or spleen enlargement, no masses, BSs normal   Skin: no cyanosis, pallor, or jaundice     CMP[MD1.1]  Recent Labs  Lab 06/17/18  0503 06/16/18  2247 06/16/18  1411 06/16/18  0515  06/15/18  0515  06/14/18  1215  06/14/18  0440  06/13/18  1556  06/13/18  0527   * 126* 124* 124*  < > 119*  < > 119*  < > 117*  < >  --   < > 116*   POTASSIUM 3.8  --   --  3.6  --  3.8  --  3.8  --  3.6  --   --   --  3.7   CHLORIDE 93*  --   --  88*  --  85*  --  84*  --  84*  --   --   --  82*   CO2 25  --   --  26  --  26  --  23  --  25  --   --   --  24   ANIONGAP 10  --   --  10  --  8  --  12  --  8  --   --   --  10   GLC 92  --   --  97  --  98  --  105*  --  102*  --   --   --  91   BUN 14  --   --  15  --  14  --  13  --  13  --   --   --  16   CR 0.60  --   --  0.58  --  0.60  --  0.60  --  0.56  --   --   --  0.57   GFRESTIMATED >90  --   --  >90  --  >90  --  >90  --  >90  --   --   --  >90   GFRESTBLACK >90  --   --  >90  --  >90  --  >90  --  >90  --   " --   --  >90   LYN 7.7*  --   --  7.7*  --  7.8*  --  8.3*  --  7.8*  --   --   --  7.6*   PROTTOTAL 5.7*  --   --   --   --  6.0*  --   --   --  6.3*  --   --   --  5.8*   ALBUMIN 2.2*  --   --   --   --  2.4*  --   --   --  2.7*  --  2.1*  --  2.0*   BILITOTAL 0.8  --   --   --   --  1.1  --   --   --  1.3  --   --   --  1.3   ALKPHOS 300*  --   --   --   --  317*  --   --   --  323*  --   --   --  361*   *  --   --   --   --  141*  --   --   --  152*  --   --   --  150*   ALT 13  --   --   --   --  15  --   --   --  13  --   --   --  18   < > = values in this interval not displayed.[MD1.2]  CBC[MD1.1]  Recent Labs  Lab 06/17/18  0503 06/16/18  0515 06/15/18  0515 06/14/18  0440   WBC 13.2* 14.1* 12.7* 11.1*   RBC 2.07* 2.16* 2.19* 2.15*   HGB 7.0* 7.2* 7.4* 7.1*   HCT 20.1* 20.6* 21.0* 20.6*   MCV 97 95 96 96   MCH 33.8* 33.3* 33.8* 33.0   MCHC 34.8 35.0 35.2 34.5   RDW 15.5* 15.0 14.9 14.7    251 255 281[MD1.2]     INR[MD1.1]  Recent Labs  Lab 06/13/18  0527 06/11/18  1424   INR 1.44* 1.45*[MD1.2]     Arterial BloodGas[MD1.1]  No lab results found in last 7 days.[MD1.2]   Venous Blood Gas[MD1.1]  No lab results found in last 7 days.[MD1.2]    Medications[MD1.1]     folic acid  1 mg Oral Daily     furosemide  40 mg Oral Daily     lidocaine (buffered or not buffered)  5-10 mL Intradermal Once     polyethylene glycol  17 g Oral Daily     sodium chloride (PF)  3 mL Intracatheter Q8H     spironolactone  25 mg Oral Daily     thiamine  250 mg Intravenous Daily[MD1.3]       Intake/Output Summary (Last 24 hours) at 06/17/18 3078  Last data filed at 06/16/18 1200   Gross per 24 hour   Intake              520 ml   Output                0 ml   Net              520 ml         Assessment & Plan      Karen Tee is a 62 year old female with a past medical history significant for alcohol dependence, cirrhosis/alcohol hepatitis with ascites 2010, dyslipidemia, depression, and tobacco dependence who presents on  6/12/2018 with unsteady gait, shortness of breath, abdominal bloating and hyponatremia.          Hyponatremia, severe   Went to clinic on 6/11/18 presenting with unsteady gait and shortness of breath, labs collected and sodium returned 112 after patient left clinic. Was called and told to go to emergency department 6/12, presented with sodium 113. Was given about 800cc's NS in emergency department, recheck sodium up to 115. Suspect hyponatremia primarily due to cirrhosis, though this is unusually low for cirrhosis and cirrhosis diagnosis is not certain (see below).    Started furosemide and spironolactone 6/12. Patient did not have much urine output. Has massive ascites needing tap, so complex situation. Sodium peaked at 117 at midnight and drifting down to 115 at noon. Held further furosemide.    Discussed 6/13 with Hepatologist who agreed with holding on further diuresis since patient to have large volume paracentesis today, continue fluid restriction, follow sodium. Suggested also that may have other etiology for hyponatremia in addition to liver disease as the degree of hyponatremia is unusual, especially in comparison to degree of liver pathology biochemically.    - Sodium checks every 6 hours. If correction > 6 mEq in 24 hours, would consider adding free water but not DDAVP since patient is likely already high in ADH. Would not expect patient's sodium to normalize, may have some low sodium chronically   -  no improvement in Na with 1500 cc fluid restriction, will try 1200.  Will need to add some lasix/wilmar to prevent recurrence[MD1.1] of ascites and see if it helps the Na as well.[MD1.4]  . .    -  finally up to 128 with lasix 40 daily and 1200 cc fluid restriction.  This is about as good as we can get it.         cirrhosis given alcohol history and marked ascites   Presented July 2010 with hyponatremia 124, marked ascites, hgb 5.1 and had been drinking heavily. CT 2010 showed hepatomegaly with fatty  infiltration, EGD showed no varices, hepatitis A/B/C serologies negative and had Hep A and Hep B vaccine series following that. Seen by Dr. Adonay Glover, Hepatology, at that time. Patient required multiple large volume paracentesis July to November 2010. Patient stopped alcohol in July and after November, needed no further paracentesis. Patient did treatment at [facility] and was dry 5 years she says today. Started drinking again about 3 years ago.      Noted abdominal distention for the past 2-3 weeks. Protuberant, fluid-filled abdomen on exam but non-tender. LFT's show alcohol hepatitis pattern, decreased albumin 2.0 and Tprotein 5.8, INR 1.45 but normal platelets. Leucocytosis otherwise nothing to indicate SBP.    Discussed with hepatology  6/13, -->paracentesis of 8 liters : RUQ US with doppler no portal vein thrombosis.  .   - no evidence of portal clots, but severe portal hypertension, fatty liver all point to cirrhosis.  Will need lasix and wilmar to prevent recurrence of ascites.  No SBP.  Bili and plts normal but INR elevated..    -will try change to oral lasix 40 daily .  BP low now and will see if it recovers with lower dose.  -  no recurrence of ascites on US.  Will continue the lasix 40, wilmar 25.  BP runs low but MAP>60 and no evidence of hypoperfusion.     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  SLUMs 19.  Is really unsafe for independent living.  Will need TCU at least for a while.    -some improvement and I expect some gradual improvement as her electrolyte status improves and she is further from her alcohol use.[MD1.1]    -SLUMs up to 23[MD1.4]      Hypotension without hypoperfusion:   -BPs low .   But no evidence of hypoperfusion.  Will try not to go up on fluids as she just puts it in her abdomen.          Pulmonary vascular congestion:  -CXR suggests some vascular congestion.  Better with lasix.  Unclear if really CHF or just diffuse  "volume overload from cirrhosis.        Leukocytosis  Presented with WBC 16.5, afebrile. Chest x-ray negative for infiltrate, but does show evidence of fluid overload. No abdominal pain or tenderness, so low suspicion for spontaneous bacterial peritonitis at this time. Tap pending. WBC 14  - No antibiotics        Anemia   Likely due to chronic alcohol use. Hgb 7.3. No evidence of bleeding.    - finally hit low of 7.0.  Iron normal, will check B12/folate.  Transfuse 1 unit[MD1.1] 6/17/2018[MD1.5].       Elevated uric acid  Presented with uric acid 8.2. No current evidence for gout.       Alcohol dependence  Daily alcohol use, at least 6-7 oz jes per day. No known history of seizures. Risk for withdrawal during current hospitalization. 1.75 liter lasts \"nearly a week\". Last drink 6 days ago. No evidence of withdrawal.   - [facility] protocol  - Prn Ativan use for withdrawal symptoms   - thiamine high dose   - folic acid      Tobacco use disorder  Smokes about 1/2 pack per day. Encourage cessation.  - Nicotine gum available      Chronic depressive personality disorder  Previously diagnosed. No longer taking any medications for depression. Defer to PCP for outpatient management.      Hyperlipidemia  Noted per problem list. Not currently taking any statin or other lipid lowering medication. Defer to PCP for outpatient management.          Fluids: Oral, fluid restriction  Electrolytes: Monitor  Nutrition: Regular diet      DVT Prophylaxis: Pneumatic Compression Devices  Code Status: Full Code - discussed directly with patient      Lines: Peripheral  Palm catheter: Not indicated      Discussion: Na finially moved with lasix and fluid restriction.  But now BP low.  Backing off on lasix some.   Will need TCU for weakness and cognitive impairment, which is showing some improvement.    With no significant LFT derangement and no recurrence of ascites, I told her I have hope the liver can regenerate enough to give her some quality " of life if she reqains her strength and cognitive abilities and stays off the alcohol.  .                  [MD1.1]          Revision History        User Key Date/Time User Provider Type Action    > MD1.4 6/17/2018  2:49 PM Garth Shepard MD Physician Addend     MD1.3 6/17/2018  8:04 AM Garth Shepard MD Physician Sign     MD1.5 6/17/2018  8:02 AM Garth Shepard MD Physician      MD1.2 6/17/2018  7:58 AM Garth Shepard MD Physician      MD1.1 6/17/2018  7:57 AM Garth Shepard MD Physician             Progress Notes by Dennise Martinez OT at 6/17/2018 12:22 PM     Author:  Dennise Martinez OT Service:  Acute IP Rehab Author Type:  Occupational Therapist    Filed:  6/17/2018 12:22 PM Date of Service:  6/17/2018 12:22 PM Creation Time:  6/17/2018 12:22 PM    Status:  Signed :  Dennise Martinez OT (Occupational Therapist)            06/17/18 1200   Signing Clinician's Name / Credentials   Signing clinician's name / credentials Dennise Martinez OTR   Quick Adds   Rehab Discipline OT   Cognitive Assessments   Cognitive Assessments Completed [facility] Mental Status Exam ([facility]):  Total Score out of /30 24/30   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   Domains assessed: orientation, memory, attention, executive functions   Additional Documentation   OT Plan toileting, stand at sink   Total Session Time   Total Session Time (minutes) 15 minutes     HILDA García[SL1.1]     Revision History        User Key Date/Time User Provider Type Action    > SL1.1 6/17/2018 12:22 PM Dennise Martinez OT Occupational Therapist Sign            Progress Notes by Dennise Martinez OT at 6/17/2018 12:14 PM     Author:  Dennise Martinez OT Service:  Acute IP Rehab Author Type:  Occupational Therapist    Filed:  6/17/2018 12:15 PM Date of Service:  6/17/2018 12:14 PM Creation Time:  6/17/2018 12:14 PM    Status:  Signed :  Dennise Martinez OT  (Occupational Therapist)            06/17/18 1200   Signing Clinician's Name / Credentials   Signing clinician's name / credentials HILDA García   Quick Adds   Rehab Discipline OT   Cognitive Assessments   Cognitive Assessments Completed [facility] Mental Status Exam ([facility]):  Total Score out of /30 24/30   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   Domains assessed: orientation, memory, attention, executive functions   Additional Documentation   OT Plan 15     HILDA García[SL1.1]     Revision History        User Key Date/Time User Provider Type Action    > SL1.1 6/17/2018 12:15 PM Dennise Martinez OT Occupational Therapist Sign            Progress Notes by Deanna Luna RN at 6/17/2018  6:42 AM     Author:  Jeremy, Deanna, RN Service:  Nursing Author Type:  Registered Nurse    Filed:  6/17/2018  6:47 AM Date of Service:  6/17/2018  6:42 AM Creation Time:  6/17/2018  6:42 AM    Status:  Signed :  Deanna Luna RN (Registered Nurse)         Pt slept well thru the night. Up to BR x 1 using walker with SBA. Pt voicing no complaints this am. BP remains soft but stable.[MC1.1]      Revision History        User Key Date/Time User Provider Type Action    > MC1.1 6/17/2018  6:47 AM Deanna Luna RN Registered Nurse Sign            Progress Notes by Carleen Adams RN at 6/16/2018  6:56 PM     Author:  Adams, Carleen, RN Service:  Emergency Medicine Author Type:  Registered Nurse    Filed:  6/16/2018  6:59 PM Date of Service:  6/16/2018  6:56 PM Creation Time:  6/16/2018  6:56 PM    Status:  Signed :  Carleen Adams RN (Registered Nurse)         Pt has been awake, alert oriented. Eating and drinking well. Had good results after taking Mirilax. Is unsteady and requires assist with all transferring. Triggered the sepsis protocol tonight. Lactic acid is 2.2 Infusing a bolus, blood cultures being drawn. VSS. Afebrile.[JH1.1]     Revision History        User Key Date/Time User  Provider Type Action    > JH1.1 6/16/2018  6:59 PM Carleen Adams, RN Registered Nurse Sign            Progress Notes by Garth Shepard MD at 6/16/2018  6:14 PM     Author:  Garth Shepard MD Service:  (none) Author Type:  Physician    Filed:  6/16/2018  6:16 PM Date of Service:  6/16/2018  6:14 PM Creation Time:  6/16/2018  6:14 PM    Status:  Signed :  Garth Shepard MD (Physician)         [facility]    Sepsis Evaluation Progress Note    Date of Service: 06/16/2018    I was called to see Karen Tee due to abnormal vital signs triggering the Sepsis SIRS screening alert. She is not known to have an infection.     Physical Exam    Vital Signs:  Temp: 98.3  F (36.8  C) Temp src: Oral BP: (!) 87/56 Pulse: 97 Heart Rate: 100 Resp: 18 SpO2: 97 % O2 Device: None (Room air)      Lab:  Lactic Acid   Date Value Ref Range Status   06/16/2018 2.2 (H) 0.7 - 2.0 mmol/L Final     Comment:     Significant value called to and read back by  VITO SALMERON RN BY GH [id number] 06/16/2018       Lactate for Sepsis Protocol   Date Value Ref Range Status   06/15/2018 1.4 0.4 - 1.9 mmol/L Final       The patient is at baseline mental status.    The rest of their physical exam is significant for stable, end stage cirrhosis .    Assessment and Plan    The SIRS and exam findings are likely due to end stage cirrhosis, there is no sign of sepsis at this time.    Disposition: The patient will remain on the current unit. We will continue to monitor this patient closely.    Garth Shepard MD[MD1.1]       Revision History        User Key Date/Time User Provider Type Action    > MD1.1 6/16/2018  6:16 PM Garth Shepard MD Physician Sign            Progress Notes by Dennise Martinez OT at 6/16/2018 12:01 PM     Author:  Dennise Martinez OT Service:  Acute IP Rehab Author Type:  Occupational Therapist    Filed:  6/16/2018 12:01 PM Date of Service:  6/16/2018 12:01 PM Creation Time:  6/16/2018 12:01  "PM    Status:  Signed :  Dennise Martinez OT (Occupational Therapist)            06/16/18 1100   Signing Clinician's Name / Credentials   Signing clinician's name / credentials HILDA García   Quick Adds   Rehab Discipline OT   Therapeutic Activity   Minutes of Treatment 8 minutes   Symptoms Noted During/After Treatment None   Treatment Detail Pt lying in bed agreeable to get up. Min/mod A supine to sit EOB. Able to follow directions to don socks I'ly. Able to follow directions easily for room mobility. Pt showing expression and engaging in conversation.   Additional Documentation   OT Plan rescreen cognition   [facility] AM-PAC  \"6 Clicks\" Daily Activity Inpatient Short Form   1. Putting on and taking off regular lower body clothing? 3 - A Little   2. Bathing (including washing, rinsing, drying)? 3 - A Little   3. Toileting, which includes using toilet, bedpan or urinal? 3 - A Little   4. Putting on and taking off regular upper body clothing? 4 - None   5. Taking care of personal grooming such as brushing teeth? 4 - None   6. Eating meals? 4 - None   Daily Activity Raw Score (Score out of 24.Lower scores equate to lower levels of function) 21   Total Session Time   Total Session Time (minutes) 8 minutes     HILDA García[SL1.1]     Revision History        User Key Date/Time User Provider Type Action    > SL1.1 6/16/2018 12:01 PM Dennise Martinez OT Occupational Therapist Sign            Progress Notes by Trinity Rodgers LICSW at 6/16/2018 11:31 AM     Author:  Trinity Rodgers LICSW Service:  (none) Author Type:      Filed:  6/16/2018 11:34 AM Date of Service:  6/16/2018 11:31 AM Creation Time:  6/16/2018 11:31 AM    Status:  Signed :  Trinity Rodgers LICSW ()         Reason for Follow up: DC planning    Anticipated discharge needs: Pt cont to need TCU on dc.   Pt has been declined at [facility] (Main: [phone] Admissions: " [phone] Fax: [phone]) and [place] [facility] (Phone: [phone] Fax: [phone]) and [facility] (Phone: [phone] Admissions: [phone] Fax: [phone]).     Pt has been accepted at [facility] (Phone: [phone] Fax: [phone]) for Monday-not able to take over the weekend.    Referrals cont to pend at [facility] Phone: ([phone]) Fax: ([phone])   and [facility] TCU Phone: (Admissions: [phone] RN Report: [phone] Fax: [phone]) . Both [facility] reported that they can assess pt on Monday, will not accept over the weekend.     Next steps: CTS to follow    Trinity SALEEM, [place], [facility] [phone]  Discharge Planner   Discharge Plans in progress: TCU  Barriers to discharge plan: medical stability  Follow up plan: CTS to follow       Entered by: Trinity Rodgers 06/16/2018 11:31 AM[AK1.1]            Revision History        User Key Date/Time User Provider Type Action    > AK1.1 6/16/2018 11:34 AM Trinity Rodgers LICSW  Sign            Progress Notes by Garth Shepard MD at 6/16/2018  8:06 AM     Author:  Garth Shepard MD Service:  (none) Author Type:  Physician    Filed:  6/16/2018  8:10 AM Date of Service:  6/16/2018  8:06 AM Creation Time:  6/16/2018  8:06 AM    Status:  Signed :  Garth Shepard MD (Physician)         SUBJECTIVE:   Ox4 today so some improvement in cognitive function  Eating some   Maintaining fluid restriction  Still weak, slightly unsteady.       ROS:4 point ROS including Respiratory, CV, GI and , other than that noted in the HPI,  is negative     CMP[MD1.1]  Recent Labs  Lab 06/16/18  0515 06/15/18  2304 06/15/18  1424 06/15/18  0515  06/14/18  1215  06/14/18  0440  06/13/18  1556  06/13/18  0527  06/12/18  1036   * 124* 122* 119*  < > 119*  < > 117*  < >  --   < > 116*  < > 113*   POTASSIUM 3.6  --   --  3.8  --  3.8  --  3.6  --   --   --  3.7   --  3.6   CHLORIDE 88*  --   --  85*  --  84*  --  84*  --   --   --  82*  --  78*   CO2 26  --   --  26  --  23  --  25  --   --   --  24  --  20   ANIONGAP 10  --   --  8  --  12  --  8  --   --   --  10  --  15*   GLC 97  --   --  98  --  105*  --  102*  --   --   --  91  --  104*   BUN 15  --   --  14  --  13  --  13  --   --   --  16  --  15   CR 0.58  --   --  0.60  --  0.60  --  0.56  --   --   --  0.57  --  0.57   GFRESTIMATED >90  --   --  >90  --  >90  --  >90  --   --   --  >90  --  >90   GFRESTBLACK >90  --   --  >90  --  >90  --  >90  --   --   --  >90  --  >90   LYN 7.7*  --   --  7.8*  --  8.3*  --  7.8*  --   --   --  7.6*  --  8.6   PROTTOTAL  --   --   --  6.0*  --   --   --  6.3*  --   --   --  5.8*  --  7.2   ALBUMIN  --   --   --  2.4*  --   --   --  2.7*  --  2.1*  --  2.0*  --  2.4*   BILITOTAL  --   --   --  1.1  --   --   --  1.3  --   --   --  1.3  --  1.6*   ALKPHOS  --   --   --  317*  --   --   --  323*  --   --   --  361*  --  415*   AST  --   --   --  141*  --   --   --  152*  --   --   --  150*  --  148*   ALT  --   --   --  15  --   --   --  13  --   --   --  18  --  20   < > = values in this interval not displayed.[MD1.2]  CBC[MD1.1]  Recent Labs  Lab 06/16/18  0515 06/15/18  0515 06/14/18  0440 06/13/18  0527   WBC 14.1* 12.7* 11.1* 12.5*   RBC 2.16* 2.19* 2.15* 2.19*   HGB 7.2* 7.4* 7.1* 7.3*   HCT 20.6* 21.0* 20.6* 20.7*   MCV 95 96 96 95   MCH 33.3* 33.8* 33.0 33.3*   MCHC 35.0 35.2 34.5 35.3   RDW 15.0 14.9 14.7 14.4    255 281 298[MD1.2]     INR[MD1.1]  Recent Labs  Lab 06/13/18  0527 06/11/18  1424   INR 1.44* 1.45*[MD1.2]     Arterial BloodGas[MD1.1]  No lab results found in last 7 days.[MD1.2]   Venous Blood Gas[MD1.1]  No lab results found in last 7 days.[MD1.2]    Medications[MD1.1]     folic acid  1 mg Oral Daily     furosemide  40 mg Oral Daily     lidocaine (buffered or not buffered)  5-10 mL Intradermal Once     polyethylene glycol  17 g Oral Daily     sodium  chloride (PF)  3 mL Intracatheter Q8H     spironolactone  25 mg Oral Daily     thiamine  250 mg Intravenous Daily[MD1.3]       Intake/Output Summary (Last 24 hours) at 06/16/18 0807  Last data filed at 06/15/18 1924   Gross per 24 hour   Intake              670 ml   Output              150 ml   Net              520 ml         ASSESSMENT: PLAN:   Assessment & Plan      Karen Tee is a 62 year old female with a past medical history significant for alcohol dependence, cirrhosis/alcohol hepatitis with ascites 2010, dyslipidemia, depression, and tobacco dependence who presents on 6/12/2018 with unsteady gait, shortness of breath, abdominal bloating and hyponatremia.          Hyponatremia, severe   Went to clinic on 6/11/18 presenting with unsteady gait and shortness of breath, labs collected and sodium returned 112 after patient left clinic. Was called and told to go to emergency department 6/12, presented with sodium 113. Was given about 800cc's NS in emergency department, recheck sodium up to 115. Suspect hyponatremia primarily due to cirrhosis, though this is unusually low for cirrhosis and cirrhosis diagnosis is not certain (see below).    Started furosemide and spironolactone 6/12. Patient did not have much urine output. Has massive ascites needing tap, so complex situation. Sodium peaked at 117 at midnight and drifting down to 115 at noon. Held further furosemide.    Discussed 6/13 with Hepatologist who agreed with holding on further diuresis since patient to have large volume paracentesis today, continue fluid restriction, follow sodium. Suggested also that may have other etiology for hyponatremia in addition to liver disease as the degree of hyponatremia is unusual, especially in comparison to degree of liver pathology biochemically.    - Sodium checks every 6 hours. If correction > 6 mEq in 24 hours, would consider adding free water but not DDAVP since patient is likely already high in ADH. Would not expect  patient's sodium to normalize, may have some low sodium chronically   6/14/2018 no improvement in Na with 1500 cc fluid restriction, will try 1200.  Will need to add some lasix/wilmar to prevent recurrence. Will try to discuss with GI goals for hyponatremia in this setting.    6/15/2018 finally moved to 121 and now 119.  Unclear how high we can get this.[MD1.1]    6/16/2018[MD1.2] finally up to 124 with lasix 40 daily and 1200 cc fluid restriction.  This is about as good as we can get it.         cirrhosis given alcohol history and marked ascites   Presented July 2010 with hyponatremia 124, marked ascites, hgb 5.1 and had been drinking heavily. CT 2010 showed hepatomegaly with fatty infiltration, EGD showed no varices, hepatitis A/B/C serologies negative and had Hep A and Hep B vaccine series following that. Seen by Dr. Adonay Glover, Hepatology, at that time. Patient required multiple large volume paracentesis July to November 2010. Patient stopped alcohol in July and after November, needed no further paracentesis. Patient did treatment at [facility] and was dry 5 years she says today. Started drinking again about 3 years ago.      Noted abdominal distention for the past 2-3 weeks. Protuberant, fluid-filled abdomen on exam but non-tender. LFT's show alcohol hepatitis pattern, decreased albumin 2.0 and Tprotein 5.8, INR 1.45 but normal platelets. Leucocytosis otherwise nothing to indicate SBP.    Discussed with hepatology  6/13, -->paracentesis of 8 liters : RUQ US with doppler no portal vein thrombosis.  .   6/14/2018 no evidence of portal clots, but severe portal hypertension, fatty liver all point to cirrhosis.  Will need lasix and wilmar to prevent recurrence of ascites.  No SBP.  Bili and plts normal but INR elevated..    6/15/2018 will try change to oral lasix 40 daily .  BP low now and will see if it recovers with lower dose.   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  SLUMs 19.  Is really unsafe for independent living.  Will need TCU at least for a while.    -some improvement and I expect some gradual improvement as her electrolyte status improves and she is further from her alcohol use.       Hypotension without hypoperfusion:   -BPs low .   But no evidence of hypoperfusion.  Will try not to go up on fluids as she just puts it in her abdomen.          Pulmonary vascular congestion:  -CXR suggests some vascular congestion.  Better with lasix.  Unclear if really CHF or just diffuse volume overload from cirrhosis.       Leukocytosis  Presented with WBC 16.5, afebrile. Chest x-ray negative for infiltrate, but does show evidence of fluid overload. No abdominal pain or tenderness, so low suspicion for spontaneous bacterial peritonitis at this time. Tap pending. WBC 14  - No antibiotics        Anemia   Likely due to chronic alcohol use. Hgb 7.3. No evidence of bleeding.    - following, check studies      Elevated uric acid  Presented with uric acid 8.2. No current evidence for gout.       Alcohol dependence  Daily alcohol use, at least 6-7 oz jes per day. No known history of seizures. Risk for withdrawal during current hospitalization. 1.75 liter lasts \"nearly a week\". Last drink 6 days ago. No evidence of withdrawal.   - CIWA protocol  - Prn Ativan use for withdrawal symptoms   - thiamine high dose   - folic acid      Tobacco use disorder  Smokes about 1/2 pack per day. Encourage cessation.  - Nicotine gum available      Chronic depressive personality disorder  Previously diagnosed. No longer taking any medications for depression. Defer to PCP for outpatient management.      Hyperlipidemia  Noted per problem list. Not currently taking any statin or other lipid lowering medication. Defer to PCP for outpatient management.          Fluids: Oral, fluid restriction  Electrolytes: Monitor  Nutrition: Regular diet      DVT Prophylaxis: Pneumatic Compression " Devices  Code Status: Full Code - discussed directly with patient      Lines: Peripheral  Palm catheter: Not indicated      Discussion: Na finially moved with lasix and fluid restriction.  But now BP low.  Backing off on lasix some.   Will need TCU for weakness and cognitive impairment, which is showing some improvement.  .[MD1.1]         Revision History        User Key Date/Time User Provider Type Action    > MD1.3 6/16/2018  8:10 AM Garth Shepard MD Physician Sign     MD1.2 6/16/2018  8:07 AM Garth Shepard MD Physician      MD1.1 6/16/2018  8:06 AM Garth Shepard MD Physician             Progress Notes by Karen Smith RN at 6/15/2018  6:08 PM     Author:  Karen Smith RN Service:  (none) Author Type:  Registered Nurse    Filed:  6/15/2018  6:08 PM Date of Service:  6/15/2018  6:08 PM Creation Time:  6/15/2018  6:08 PM    Status:  Signed :  Karen Smith RN (Registered Nurse)         Giving 250 cc NORMAL SALINE bolus per Dr. Shepard order, sepsis bpa fired, L.A. 1.2[SC1.1]     Revision History        User Key Date/Time User Provider Type Action    > SC1.1 6/15/2018  6:08 PM Karen Smith RN Registered Nurse Sign            Progress Notes by Karen Smith RN at 6/15/2018  5:26 PM     Author:  Karen Smith RN Service:  (none) Author Type:  Registered Nurse    Filed:  6/15/2018  5:39 PM Date of Service:  6/15/2018  5:26 PM Creation Time:  6/15/2018  5:26 PM    Status:  Addendum :  Karen Smith RN (Registered Nurse)         Blood pressure remains low at 77/60 (64), orientation has improved, pt is now aware of month, date and year, remains forgetful. Remains weak.[SC1.1]   Dr. Shepard updated on blood pressure : 1739[SC1.2]     Revision History        User Key Date/Time User Provider Type Action    > SC1.2 6/15/2018  5:39 PM Karen Smith RN Registered Nurse Addend     SC1.1 6/15/2018  5:28 PM Karen Smith, RN Registered Nurse Sign            Progress Notes by Trinity Rodgers,  RAMESH at 6/15/2018 11:04 AM     Author:  Trinity Rodgers LICSW Service:  (none) Author Type:      Filed:  6/15/2018  1:20 PM Date of Service:  6/15/2018 11:04 AM Creation Time:  6/15/2018 11:02 AM    Status:  Addendum :  Trinity Rodgers LICSW ()         Reason for Follow up: DC Planning    Anticipated discharge needs: Pt has been declined admission at  [facility] (Main: [phone] Admissions: [phone] Fax: [phone])  And [facility] (Phone: [phone] Fax: [phone]).     Referrals are still pending at   [facility] (Phone: [phone] Admissions: [phone] Fax: [phone])  [facility] TCU Phone: (Admissions: [phone] RN Report: [phone] Fax: [phone])   [facility] (Phone: [phone] Fax: [phone])[AK1.1]  [facility] Phone: ([phone]) Fax: ([phone])[AK1.2]      [facility] also sending global referrals to other TCUs. Pt will need to have a therapy need in order to qualify for TCU.     Next steps: CTS to follow    Trinity Rodgers MSW, RAMESH, [facility] [phone][AK1.1]  Discharge Planner[AK1.3]   Discharge Plans in progress: TCU  Barriers to discharge plan: medical stability  Follow up plan: CTS to follow       Entered by:[AK1.1] Trinity Rodgers 06/15/2018 11:03 AM[AK1.3]            Revision History        User Key Date/Time User Provider Type Action    > AK1.2 6/15/2018  1:20 PM Trinity Rodgers LICSW  Addend     AK1.3 6/15/2018 11:04 AM Trinity Rodgers LICSW  Sign     AK1.1 6/15/2018 11:02 AM Trinity Rodgers LICSW              Progress Notes by Karen Smith RN at 6/15/2018 11:38 AM     Author:  Karen Smith RN Service:  (none) Author Type:  Registered Nurse    Filed:  6/15/2018 11:48 AM Date of Service:  6/15/2018 11:38 AM Creation Time:  6/15/2018 11:38 AM    Status:  Signed :  Karen Smith RN  "(Registered Nurse)         Pt alert, cooperative, unable to state date until looking at board in room, states its 1988. CIWA 3.   Ambulated to bathroom with walker and standby, assisted pt to perform ACTIVITIES OF DAILY LIVING's. Ambulated back to chair and pt very weak and unsteady, needing assistance so as not to fall.  currently pt lives in apt. And pt states she feels she can't go home, I'm to weak to go back there\"[SC1.1]     Revision History        User Key Date/Time User Provider Type Action    > SC1.1 6/15/2018 11:48 AM Karen Smith, RN Registered Nurse Sign            Progress Notes by Garth Shepard MD at 6/15/2018  7:54 AM     Author:  Garth Shepard MD Service:  (none) Author Type:  Physician    Filed:  6/15/2018  8:24 AM Date of Service:  6/15/2018  7:54 AM Creation Time:  6/15/2018  7:54 AM    Status:  Signed :  Garth Shepard MD (Physician)         SUBJECTIVE:   Feels better.  Less abd distension  Still eating poorly.   Walked with walker.       ROS:4 point ROS including Respiratory, CV, GI and , other than that noted in the HPI,  is negative     OBJECTIVE:[MD1.1]   BP 94/52 (BP Location: Left arm)  Pulse 95  Temp 97.7  F (36.5  C) (Oral)  Resp 16  Ht 1.6 m (5' 3\")  Wt 66 kg (145 lb 8.1 oz)  LMP 05/19/2007  SpO2 99%  BMI 25.77 kg/m2[MD1.2]    GENERAL APPEARANCE:  Awake, alert, conversant, seems to understand the gravity of her situation      RESP:clear      CV: regular rate and rhythm,  No  murmur , edema: none       Abdomen: soft, nontender, no liver or spleen enlargement, no masses, BSs normal   Skin: no cyanosis, pallor, or jaundice    CMP[MD1.1]  Recent Labs  Lab 06/15/18  0515 06/14/18  2155 06/14/18  1429 06/14/18  1215  06/14/18  0440  06/13/18  1556  06/13/18  0527  06/12/18  1036   * 121* 120* 119*  < > 117*  < >  --   < > 116*  < > 113*   POTASSIUM 3.8  --   --  3.8  --  3.6  --   --   --  3.7  --  3.6   CHLORIDE 85*  --   --  84*  --  84*  --   --   --  " 82*  --  78*   CO2 26  --   --  23  --  25  --   --   --  24  --  20   ANIONGAP 8  --   --  12  --  8  --   --   --  10  --  15*   GLC 98  --   --  105*  --  102*  --   --   --  91  --  104*   BUN 14  --   --  13  --  13  --   --   --  16  --  15   CR 0.60  --   --  0.60  --  0.56  --   --   --  0.57  --  0.57   GFRESTIMATED >90  --   --  >90  --  >90  --   --   --  >90  --  >90   GFRESTBLACK >90  --   --  >90  --  >90  --   --   --  >90  --  >90   LYN 7.8*  --   --  8.3*  --  7.8*  --   --   --  7.6*  --  8.6   PROTTOTAL 6.0*  --   --   --   --  6.3*  --   --   --  5.8*  --  7.2   ALBUMIN 2.4*  --   --   --   --  2.7*  --  2.1*  --  2.0*  --  2.4*   BILITOTAL 1.1  --   --   --   --  1.3  --   --   --  1.3  --  1.6*   ALKPHOS 317*  --   --   --   --  323*  --   --   --  361*  --  415*   *  --   --   --   --  152*  --   --   --  150*  --  148*   ALT 15  --   --   --   --  13  --   --   --  18  --  20   < > = values in this interval not displayed.[MD1.2]  CBC[MD1.1]  Recent Labs  Lab 06/15/18  0515 06/14/18  0440 06/13/18  0527 06/12/18  1036   WBC 12.7* 11.1* 12.5* 16.5*   RBC 2.19* 2.15* 2.19* 2.52*   HGB 7.4* 7.1* 7.3* 8.3*   HCT 21.0* 20.6* 20.7* 24.1*   MCV 96 96 95 96   MCH 33.8* 33.0 33.3* 32.9   MCHC 35.2 34.5 35.3 34.4   RDW 14.9 14.7 14.4 14.6    281 298 372[MD1.2]     INR[MD1.1]  Recent Labs  Lab 06/13/18  0527 06/11/18  1424   INR 1.44* 1.45*[MD1.2]     Arterial BloodGas[MD1.1]  No lab results found in last 7 days.[MD1.2]   Venous Blood Gas[MD1.1]  No lab results found in last 7 days.[MD1.2]    Medications[MD1.1]     folic acid  1 mg Oral Daily     furosemide  40 mg Oral Daily     lidocaine (buffered or not buffered)  5-10 mL Intradermal Once     sodium chloride (PF)  3 mL Intracatheter Q8H     spironolactone  25 mg Oral Daily     thiamine  250 mg Intravenous Daily[MD1.3]       Intake/Output Summary (Last 24 hours) at 06/15/18 0755  Last data filed at 06/15/18 0600   Gross per 24 hour    Intake             1070 ml   Output              800 ml   Net              270 ml         ASSESSMENT: PLAN:   ASSESSMENT: PLAN:   Assessment & Plan     Karen Tee is a 62 year old female with a past medical history significant for alcohol dependence, cirrhosis/alcohol hepatitis with ascites 2010, dyslipidemia, depression, and tobacco dependence who presents on 6/12/2018 with unsteady gait, shortness of breath, abdominal bloating and hyponatremia.          Hyponatremia, severe   Went to clinic on 6/11/18 presenting with unsteady gait and shortness of breath, labs collected and sodium returned 112 after patient left clinic. Was called and told to go to emergency department 6/12, presented with sodium 113. Was given about 800cc's NS in emergency department, recheck sodium up to 115. Suspect hyponatremia primarily due to cirrhosis, though this is unusually low for cirrhosis and cirrhosis diagnosis is not certain (see below).    Started furosemide and spironolactone 6/12. Patient did not have much urine output. Has massive ascites needing tap, so complex situation. Sodium peaked at 117 at midnight and drifting down to 115 at noon. Held further furosemide.    Discussed 6/13 with Hepatologist who agreed with holding on further diuresis since patient to have large volume paracentesis today, continue fluid restriction, follow sodium. Suggested also that may have other etiology for hyponatremia in addition to liver disease as the degree of hyponatremia is unusual, especially in comparison to degree of liver pathology biochemically.    - Sodium checks every 6 hours. If correction > 6 mEq in 24 hours, would consider adding free water but not DDAVP since patient is likely already high in ADH. Would not expect patient's sodium to normalize, may have some low sodium chronically   6/14/2018 no improvement in Na with 1500 cc fluid restriction, will try 1200.  Will need to add some lasix/wilmar to prevent recurrence. Will try  to discuss with GI goals for hyponatremia in this setting.[MD1.1]    6/15/2018[MD1.4] finally moved to 121 and now 119.  Unclear how high we can get this.        Suspect cirrhosis given alcohol history and marked ascites   Presented July 2010 with hyponatremia 124, marked ascites, hgb 5.1 and had been drinking heavily. CT 2010 showed hepatomegaly with fatty infiltration, EGD showed no varices, hepatitis A/B/C serologies negative and had Hep A and Hep B vaccine series following that. Seen by Dr. Adonay Glover, Hepatology, at that time. Patient required multiple large volume paracentesis July to November 2010. Patient stopped alcohol in July and after November, needed no further paracentesis. Patient did treatment at [facility] and was dry 5 years she says today. Started drinking again about 3 years ago.      Noted abdominal distention for the past 2-3 weeks. Protuberant, fluid-filled abdomen on exam but non-tender. LFT's show alcohol hepatitis pattern, decreased albumin 2.0 and Tprotein 5.8, INR 1.45 but normal platelets. Leucocytosis otherwise nothing to indicate SBP.    Discussed with hepatology  6/13, -->paracentesis of 8 liters : RUQ US with doppler no portal vein thrombosis.  .   6/14/2018 no evidence of portal clots, but severe portal hypertension, fatty liver all point to cirrhosis.  Will need lasix and wilmar to prevent recurrence of ascites.  No SBP.  Bili and plts normal but INR elevated..[MD1.1]    6/15/2018[MD1.5] will try change to oral lasix 40 daily .  BP low now and will see if it recovers with lower dose.   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  SLUMs 19.  Is really unsafe for independent living.  Will need TCU at least for a while.      Hypotension without hypoperfusion:   -BPs low on[MD1.1] 6/15/2018[MD1.6].  But no evidence of hypoperfusion.  Will try not to go up on fluids as she just puts it in her abdomen.        Pulmonary vascular  "congestion:  -CXR suggests some vascular congestion.  Better with lasix.  Unclear if really CHF or just diffuse volume overload from cirrhosis.      Leukocytosis  Presented with WBC 16.5, afebrile. Chest x-ray negative for infiltrate, but does show evidence of fluid overload. No abdominal pain or tenderness, so low suspicion for spontaneous bacterial peritonitis at this time. Tap pending. WBC 14  - No antibiotics        Anemia   Likely due to chronic alcohol use. Hgb 7.3. No evidence of bleeding.    - following, check studies      Elevated uric acid  Presented with uric acid 8.2. No current evidence for gout.       Alcohol dependence  Daily alcohol use, at least 6-7 oz jes per day. No known history of seizures. Risk for withdrawal during current hospitalization. 1.75 liter lasts \"nearly a week\". Last drink 6 days ago. No evidence of withdrawal.   - CIWA protocol  - Prn Ativan use for withdrawal symptoms   - thiamine high dose   - folic acid      Tobacco use disorder  Smokes about 1/2 pack per day. Encourage cessation.  - Nicotine gum available      Chronic depressive personality disorder  Previously diagnosed. No longer taking any medications for depression. Defer to PCP for outpatient management.      Hyperlipidemia  Noted per problem list. Not currently taking any statin or other lipid lowering medication. Defer to PCP for outpatient management.          Fluids: Oral, fluid restriction  Electrolytes: Monitor  Nutrition: Regular diet      DVT Prophylaxis: Pneumatic Compression Devices  Code Status: Full Code - discussed directly with patient      Lines: Peripheral  Palm catheter: Not indicated     Discussion: Na finially moved with lasix and fluid restriction.  But now BP low.  Backing off on lasix some.   Will need TCU for cognitive impairment.               [MD1.1]          Revision History        User Key Date/Time User Provider Type Action    > MD1.3 6/15/2018  8:24 AM Garth Shepard MD Physician Sign "     MD1.5 6/15/2018  8:21 AM Garth Shepard MD Physician      MD1.6 6/15/2018  7:59 AM Garth Shepard MD Physician      MD1.4 6/15/2018  7:57 AM Garth Shepard MD Physician      MD1.2 6/15/2018  7:55 AM Garth Shepard MD Physician      MD1.1 6/15/2018  7:54 AM Garth Shepard MD Physician             Progress Notes by Tania Yun RN at 6/15/2018  6:24 AM     Author:  Tania Yun RN Service:  (none) Author Type:  Registered Nurse    Filed:  6/15/2018  6:28 AM Date of Service:  6/15/2018  6:24 AM Creation Time:  6/15/2018  6:24 AM    Status:  Signed :  Tania Yun RN (Registered Nurse)         Patients BP slightly lower than her baseline, but she is on a fluid restriction of 1200 cc and got 3 doses of IV Lasix yesterday. Patient was up denies Dizziness. HR 90's. This morning she is due for cxr. WBC is up slightly to 12. She fired Sepsis alert. Will defer to AM physician.[CC1.1]     Revision History        User Key Date/Time User Provider Type Action    > CC1.1 6/15/2018  6:28 AM Tania Yun RN Registered Nurse Sign                  Procedure Notes     No notes of this type exist for this encounter.         Progress Notes - Therapies (Notes from 06/15/18 through 06/18/18)      Progress Notes by Dennise Martinez OT at 6/17/2018 12:22 PM     Author:  Dennise Martinez OT Service:  Acute IP Rehab Author Type:  Occupational Therapist    Filed:  6/17/2018 12:22 PM Date of Service:  6/17/2018 12:22 PM Creation Time:  6/17/2018 12:22 PM    Status:  Signed :  Dennise Martinez OT (Occupational Therapist)            06/17/18 1200   Signing Clinician's Name / Credentials   Signing clinician's name / credentials Dennise Martinez OTR   Quick Adds   Rehab Discipline OT   Cognitive Assessments   Cognitive Assessments Completed [facility] Mental Status Exam (SLUMS):  Total Score out of /30 24/30   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    Domains assessed: orientation, memory, attention, executive functions   Additional Documentation   OT Plan toileting, stand at sink   Total Session Time   Total Session Time (minutes) 15 minutes     HILDA García[SL1.1]     Revision History        User Key Date/Time User Provider Type Action    > SL1.1 6/17/2018 12:22 PM Dennise Martinez OT Occupational Therapist Sign            Progress Notes by Dennise Martinez OT at 6/17/2018 12:14 PM     Author:  Dennise Martinez OT Service:  Acute IP Rehab Author Type:  Occupational Therapist    Filed:  6/17/2018 12:15 PM Date of Service:  6/17/2018 12:14 PM Creation Time:  6/17/2018 12:14 PM    Status:  Signed :  Dennise Martinez OT (Occupational Therapist)            06/17/18 1200   Signing Clinician's Name / Credentials   Signing clinician's name / credentials HILDA García   Quick Adds   Rehab Discipline OT   Cognitive Assessments   Cognitive Assessments Completed [facility] Mental Status Exam (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   Domains assessed: orientation, memory, attention, executive functions   Additional Documentation   OT Plan 15     HILDA García[SL1.1]     Revision History        User Key Date/Time User Provider Type Action    > SL1.1 6/17/2018 12:15 PM Dennise Martinez OT Occupational Therapist Sign            Progress Notes by Dennise Martinez OT at 6/16/2018 12:01 PM     Author:  Dennise Martinez OT Service:  Acute IP Rehab Author Type:  Occupational Therapist    Filed:  6/16/2018 12:01 PM Date of Service:  6/16/2018 12:01 PM Creation Time:  6/16/2018 12:01 PM    Status:  Signed :  Dennise Martinez OT (Occupational Therapist)            06/16/18 1100   Signing Clinician's Name / Credentials   Signing clinician's name / credentials HILDA García   Quick Adds   Rehab Discipline OT   Therapeutic Activity   Minutes of  "Treatment 8 minutes   Symptoms Noted During/After Treatment None   Treatment Detail Pt lying in bed agreeable to get up. Min/mod A supine to sit EOB. Able to follow directions to don socks I'ly. Able to follow directions easily for room mobility. Pt showing expression and engaging in conversation.   Additional Documentation   OT Plan rescreen cognition   [facility] AM-PAC  \"6 Clicks\" Daily Activity Inpatient Short Form   1. Putting on and taking off regular lower body clothing? 3 - A Little   2. Bathing (including washing, rinsing, drying)? 3 - A Little   3. Toileting, which includes using toilet, bedpan or urinal? 3 - A Little   4. Putting on and taking off regular upper body clothing? 4 - None   5. Taking care of personal grooming such as brushing teeth? 4 - None   6. Eating meals? 4 - None   Daily Activity Raw Score (Score out of 24.Lower scores equate to lower levels of function) 21   Total Session Time   Total Session Time (minutes) 8 minutes     HILDA García[SL1.1]     Revision History        User Key Date/Time User Provider Type Action    > SL1.1 6/16/2018 12:01 PM Dennise Martinez OT Occupational Therapist Sign            "

## 2018-06-12 NOTE — IP AVS SNAPSHOT
[facility]    [address] [address]    Phone:  [phone]    Fax:  [phone]                                       After Visit Summary   6/12/2018    Karen Tee    MRN: [id number]           After Visit Summary Signature Page     I have received my discharge instructions, and my questions have been answered. I have discussed any challenges I see with this plan with the nurse or doctor.    ..........................................................................................................................................  Patient/Patient Representative Signature      ..........................................................................................................................................  Patient Representative Print Name and Relationship to Patient    ..................................................               ................................................  Date                                            Time    ..........................................................................................................................................  Reviewed by Signature/Title    ...................................................              ..............................................  Date                                                            Time

## 2018-06-12 NOTE — TELEPHONE ENCOUNTER
Huddled with provider after patient has not shown up in ED. Agreed with do a welfare check by sending police to do a welfare check.     Spoke with dispatch.    BYRON Oneill, RN      Police called back and stated no one is home.     BYRON Oneill, RN

## 2018-06-12 NOTE — IP AVS SNAPSHOT
` ` Patient Information     Patient Name Sex Karen Eisenberg ([id number]) Female 1955       Room Bed    1006 1006-55      Patient Demographics     Address Phone    [address]  [address] [place] [address] [phone] (Home)  [phone] (Mobile)      Patient Ethnicity & Race     Ethnic Group Patient Race    American White      Emergency Contact(s)     Name Relation Home Work Mobile    Elaine Mccoy [phone]        Documents on File        Status Date Received Description       Documents for the Patient    Insurance Card  05     Face Sheet  () 07     Privacy Notice - [place] Received 05     Face Sheet Received () 07     Insurance Card  10/24/07     Face Sheet Received () 07/09/10     External Medication Information Consent Accepted () 12/13/10     Patient ID       Consent for Services - Hospital/Clinic Received () 10/26/10     [facility] Authorization  11/23/10 [facility]    Insurance Card Received 12     Insurance Card Received 12     Consent for Services - Hospital/Clinic Received () 12     External Medication Information Consent Accepted () 12     Community Care Application  12     Consent for EHR Access  13 Copied from existing Consent for services - C/HOD collected on 2012    [facility] Specified Other       Consent for Services - Hospital and Clinic Received 18     HIE Auth Received 18     Insurance Card Received 18 Medicare    Consent for Services - Hospital/Clinic  (Deleted)         Documents for the Encounter    CMS IM for Patient Signature Received 18     Assessment/Questionnaire  18 [place] - SUICIDE SEVERITY RATING SCALE (C-SSRS)    CMS IM for Patient Signature  06/15/18 IMPORTANT MESSAGE FROM MEDICARE    CMS IM for Patient Signature Received 18       Admission Information     Attending Provider Admitting  Provider Admission Type Admission Date/Time    Mary Ellen Mazariegos MD Hemmila, Moy Liang MD Emergency 06/12/18  1055    Discharge Date Hospital Service Auth/Cert Status Service Area     ICU [facility]    Unit Room/Bed Admission Status       [place] [facility] 1006/1006-01 Admission (Confirmed)       Admission     Complaint    Hyponatremia, Hyponatremia      Hospital Account     Name Acct ID Class Status Primary Coverage    Karen Tee [id number] Inpatient Open MEDICARE - MEDICARE            Guarantor Account (for Hospital Account #[id number])     Name Relation to Pt Service Area Active? Acct Type    Karen Tee  FCS Yes Personal/Family    Address Phone          [address] [place] [address] [phone](H)              Coverage Information (for Hospital Account #[id number])     F/O Payor/Plan Precert #    MEDICARE/MEDICARE     Subscriber Subscriber #    Karen Tee [id number]    Address Phone    ATT[employer] CLAIMS  [address]  [facility] [place] [address] [phone]

## 2018-06-12 NOTE — H&P
[facility]    History and Physical  Hospital Medicine       Date of Admission:  6/12/2018  Date of Service: 6/12/2018     CC: Unsteady gait, shortness of breath, abdominal bloating    Assessment & Plan   Karen Tee is a 62 year old female with a past medical history significant for alcohol dependence, cirrhosis with ascites, portal hypertension, dyslipidemia, depression, and tobacco dependence who presents on 6/12/2018 with unsteady gait, shortness of breath, abdominal bloating, and hyponatremia.      Hyponatremia  Went to clinic on 6/11/18 presenting with unsteady gait and shortness of breath, labs collected and sodium returned 112 after patient left clinic. Was called and told to go to emergency department, presented with sodium 113. Was given about 800cc's NS in emergency department, recheck sodium up to 115. Hyponatremia is secondary to fluid overload and worsening ascites (see below).  - Sodium checks every 6 hours (time lab draws for midnight, 6 am)  - Correct sodium via diuresis (see below)  - Needs reversal protocol if sodium corrects > 8 mmol/L in 24 hours - page Hospitalist if sodium increases by more than 4 mmol/L between lab rechecks  - Neuro checks  - Admit to ICU   - Sodium / urine studies ordered by emergency department (urine osmolality, sodium urine, serum osmolality) and cortisol      Cirrhosis of liver with ascites, unspecified hepatic cirrhosis type (H)  Portal hypertension (H)  Chronic alcohol use, history of ascites in the past requiring paracentesis (most recent paracentesis in approximately 2010). Noted abdominal distention for the past 2-3 weeks. Presents with multiple LFT abnormalities (see below). Ammonia normal at 25. Positive fluid wave on exam. Non-tender, denies pain. Possible spontaneous bacterial peritonitis due to leukocytosis (see below), although afebrile and no pain.  - Diureses with IV Lasix 20 mg now, at 8 pm, and again in the morning  - Start  spironolactone 25 mg  - Fluid restriction 1500 mL fluid/daily  - No paracentesis at this time, reevaluate tomorrow      Leukocytosis  Presented with WBC 16.5, afebrile. Chest x-ray negative for infiltrate, but does show evidence of fluid overload. No abdominal pain or tenderness, so low suspicion for spontaneous bacterial peritonitis at this time.  - No antibiotics   - Recheck CBC in am       Abnormal LFTs  Presented with total bilirubin 1.6, alkaline phos 415, ALT 20, . All likely related to patient's cirrhosis and chronic alcohol use.   - Address ascites and monitor daily CMP      Elevated INR  Presented with INR 1.45. Not on anticoagulation. Secondary to patient's liver disease due to chronic alcohol use.      Elevated uric acid  Presented with uric acid 8.2. No current evidence for gout.       Alcohol dependence  Daily alcohol use, at least 6-7 oz jes per day. No known history of seizures. Risk for withdrawal during current hospitalization.  - CIWA protocol  - Prn Ativan use for withdrawal symptoms      Tobacco use disorder  Smokes about 1/2 pack per day. Encourage cessation.  - Nicotine gum available      Chronic depressive personality disorder  Previously diagnosed. No longer taking any medications for depression. Defer to PCP for outpatient management.      Hyperlipidemia  Noted per problem list. Not currently taking any statin or other lipid lowering medication. Defer to PCP for outpatient management.        Fluids: Oral, fluid restriction  Electrolytes: Monitor  Nutrition: Regular diet    DVT Prophylaxis: Pneumatic Compression Devices  Code Status: Full Code - discussed directly with patient    Lines: Peripheral  Palm catheter: Not indicated    Disposition: Anticipate discharge in 2+ day(s). Appropriate for inpatient care.    Discussion: Assessment and plan discussed with Dr. Mckeon.    Concepción Palmer PA-C  [facility] Hospitalist Service  Pager: [phone]          Primary Care  "Physician   Tyler Khalil [phone]    History is obtained from the patient and review of old records via the EMR.     Past Medical History      Past Medical History:   Diagnosis Date     Alcohol dependence (H)      Chronic depressive personality disorder      Cirrhoses, liver     diagnosed 7/10     Tobacco dependence          Diagnosis Date Noted     Priority: Medium     Alcohol dependence (H) 03/09/2012     Priority: Medium     Quit in 2010 but started drinking again.       HYPERLIPIDEMIA LDL GOAL <100 10/31/2010     Priority: Medium     Ascites 08/23/2010     Priority: Medium     Takes Oxycodone, 5mg as needed every six hours; started at [facility], 8-19-10. Using about one per day.        Portal hypertension (H) 08/23/2010     Priority: Medium     Inflammatory disease of breast 10/31/2007     Priority: Medium     Hyperlipidemia 10/25/2007     Priority: Medium     Problem list name updated by automated process. Provider to review       Elevated blood pressure reading without diagnosis of hypertension 09/25/2007     Priority: Medium     Tobacco use disorder 09/25/2007     Priority: Medium     Major depressive disorder, single episode, mild (H) 10/19/2006     Priority: Medium     Chronic depressive personality disorder 01/20/2006     Priority: Medium        Past Surgical History     Past Surgical History:   Procedure Laterality Date     HC EXCISION BREAST LESION, OPEN >=1          History of Present Illness   Karen Tee is a 62 year old female with the above past medical history now presents on 6/12/2018 with abdominal distention, gait instability, and shortness of breath. Abdominal \"bloating\" started about 3 weeks ago and has been getting progressively worse. She denies any associated abdominal pain or tenderness, no nausea or vomiting. Over the past few weeks she has also noticed that she has been feeling unsteady on her feet and uncoordinated. She denies any dizziness, falls, or " syncopal episodes, but just has difficulty with ambulation because she is so unsteady. She develops dyspnea with exertion as well, which is new in the past few weeks. She denies any associated cough or wheezing.    Yesterday she went to see her PCP regarding her abdominal distention. She was referred to have an outpatient paracentesis if she develops abdominal pain. Labs were checked and patient went home. Lab results returned after patient had already left the clinic, and her sodium returned at 112. She was called and advised to go to the emergency department for evaluation and treatment of hyponatremia. She presented to the emergency department on 2018 and sodium was still low at 113. Prior to being evaluated by anyone she was given about 800 cc's of NS. Recheck of her sodium was 115.    On review of systems patient is fatigued but unable to sleep well. She has a decreased appetite and is not eating much. She has some bilateral lower extremity edema. She has chronic back pain but no new or acute pains. She is very unsteady on her feet and feels generally weak. She notes a recent weight increase, approximately 40 pounds. Patient denies any recent fever, chills, recent illness, chest pain, palpitations, cough, wheeze, abdominal pain, nausea, vomiting, diarrhea, constipation, melena, hematochezia, dysuria or urinary changes, skin changes or rash, new pain, headache, vision changes, dizziness, falls, syncope, numbness, tingling, confusion, or slurred speech.        Prior to Admission Medications   Prior to Admission Medications   Prescriptions Last Dose Informant Patient Reported? Taking?   ibuprofen 200 MG capsule Past Week at Unknown time Self Yes Yes   Si mg every 4 hours as needed Taking as needed for pain.      Facility-Administered Medications: None     Allergies   No Known Allergies    Family History    Family History   Problem Relation Age of Onset     Hypertension Mother      Depression Sister       "and Anxiety.     Psychotic Disorder Sister      severe anxiety     Neurologic Disorder Brother       from brain tumor.       Social History   Social History     Social History     Marital status:      Spouse name: N/A     Number of children: N/A     Years of education: N/A     Occupational History     Not on file.     Social History Main Topics     Smoking status: Current Every Day Smoker     Packs/day: 0.50     Types: Cigarettes     Smokeless tobacco: Never Used     Alcohol use Yes      Comment: Quit in  but started drinking again. Currently drinks approx 7 oz jes daily     Drug use: No      Comment: hx of alcohol abuse.     Sexual activity: No     Other Topics Concern     Parent/Sibling W/ Cabg, Mi Or Angioplasty Before 65f 55m? Yes     Social History Narrative       Review of Systems     A complete 10 point review of systems was negative except for items noted in the HPI/subjective.    Physical Exam   BP 98/58  Temp 97.8  F (36.6  C) (Oral)  Resp 20  Ht 1.6 m (5' 3\")  Wt 76.2 kg (168 lb)  LMP 2007  SpO2 99%  BMI 29.76 kg/m2     Weight: 168 lbs 0 oz Body mass index is 29.76 kg/(m^2).     Constitutional: Slightly lethargic but alert, oriented, cooperative, no apparent distress, appears nontoxic, speaking in full sentences.     Eyes: Eyes are clear, pupils are reactive. No scleral icterus. Extroccular movements intact.     HEENT: Oropharynx is clear and moist, no lesions. Normocephalic, no evidence of cranial trauma.      Cardiovascular: Regular rhythm and rate, normal S1 and S2. No murmur, rubs, or gallops. Peripheral pulses in tact bilaterally. Bilateral +2 pitting lower extremity edema.    Respiratory: Lung sounds are clear to auscultation bilaterally without wheezes, rhonchi, or crackles.    GI: Profoundly distended. Non-tender to palpation, no rebound or guarding. No hepatosplenomegaly or masses appreciated, although distention makes palpation difficult. Normal bowel sounds. " Negative cutaneous hyperesthesia. Positive fluid wave test.    Musculoskeletal: Without obvious deformity, normal range of motion. Normal muscle bulk and tone.     Skin: Warm and dry, no rashes or ecchymoses. No mottling of skin.      Neurologic: Patient moves all extremities. Cranial nerves are grossly intact.  is symmetric. Gross strength and sensation are equal bilaterally.    Genitourinary: Deferred    Data   Data reviewed today:     Recent Labs  Lab 06/12/18  1220 06/12/18  1036 06/11/18  1424   WBC  --  16.5* 17.6*   HGB  --  8.3* 8.5*   MCV  --  96 97   PLT  --  372 380   INR  --   --  1.45*   * 113* 112*   POTASSIUM  --  3.6 3.7   CHLORIDE  --  78* 78*   CO2  --  20 16*   BUN  --  15 13   CR  --  0.57 0.50*   ANIONGAP  --  15* 18*   LYN  --  8.6 8.5   GLC  --  104* 113*   ALBUMIN  --  2.4* 2.3*   PROTTOTAL  --  7.2 7.0   BILITOTAL  --  1.6* 1.5*   ALKPHOS  --  415* 412*   ALT  --  20 20   AST  --  148* 165*       Recent Results (from the past 24 hour(s))   Chest XR,  PA & LAT    Narrative    XR CHEST 2 VW 6/12/2018 1:27 PM    HISTORY: Short of breath. Weakness.    COMPARISON: 7/9/2010.      Impression    IMPRESSION: 2 views of the chest show low lung volumes. In addition,  there is a new small left pleural effusion. Pulmonary vessels are  mildly congested and slightly ill-defined with a small mild  peribronchial thickening. This raises the possibility of a small  amount interstitial pulmonary edema related to CHF.     GREG WALLIS MD       I personally reviewed the chest CT image(s) showing pulmonary edema.    Concepción Palmer PA-C  [facility]  Pager: [phone]

## 2018-06-12 NOTE — IP AVS SNAPSHOT
` `     [facility] INTENSIVE CARE: [phone]            Medication Administration Report for Karen Tee as of 06/18/18 1416   Legend:    Given Hold Not Given Due Canceled Entry Other Actions    Time Time (Time) Time  Time-Action       Inactive    Active    Linked        Medications 06/12/18 06/13/18 06/14/18 06/15/18 06/16/18 06/17/18 06/18/18    bisacodyl (DULCOLAX) EC tablet 5 mg  Dose: 5 mg  Freq: DAILY PRN Route: PO  PRN Reason: constipation  Start: 06/12/18 1539   Admin Instructions: Do not crush or chew. Swallow whole. May titrate 1 tablet each day until response. Maximum of 3 tablets daily. Hold for loose stools. This is the first step of a three step constipation treatment.    Admin. Amount: 1 tablet (1 × 5 mg tablet)  Dispense Loc: [facility]              Or  bisacodyl (DULCOLAX) EC tablet 10 mg  Dose: 10 mg  Freq: DAILY PRN Route: PO  PRN Reason: constipation  Start: 06/12/18 1539   Admin Instructions: Do not crush or chew. Swallow whole. May titrate 1 tablet each day until response. Maximum of 3 tablets daily. Hold for loose stools. This is the first step of a three step constipation treatment.    Admin. Amount: 2 tablet (2 × 5 mg tablet)  Dispense Loc: [facility]              Or  bisacodyl (DULCOLAX) EC tablet 15 mg  Dose: 15 mg  Freq: DAILY PRN Route: PO  PRN Reason: constipation  Start: 06/12/18 1539   Admin Instructions: Do not crush or chew. Swallow whole. May titrate 1 tablet each day until response. Maximum of 3 tablets daily. Hold for loose stools. This is the first step of a three step constipation treatment.    Admin. Amount: 3 tablet (3 × 5 mg tablet)  Dispense Loc: [facility]               folic acid (FOLVITE) tablet 1 mg  Dose: 1 mg  Freq: DAILY Route: PO  Start: 06/14/18 0800   Admin. Amount: 1 tablet (1 × 1 mg tablet)  Last Admin: 06/18/18 0837  Dispense Loc: [facility]       0820 (1 mg)-Given        0829 (1 mg)-Given        0828 (1 mg)-Given        0832 (1 mg)-Given         0837 (1 mg)-Given           furosemide (LASIX) tablet 40 mg  Dose: 40 mg  Freq: DAILY Route: PO  Start: 06/15/18 0830   Admin. Amount: 1 tablet (1 × 40 mg tablet)  Last Admin: 18 0837  Dispense Loc: [facility]        0829 (40 mg)-Given        0828 (40 mg)-Given        0832 (40 mg)-Given        0837 (40 mg)-Given           HYDROmorphone (PF) (DILAUDID) injection 0.3-0.5 mg  Dose: 0.3-0.5 mg  Freq: EVERY 2 HOURS PRN Route: IV  PRN Reason: severe pain  Start: 18 1539   Admin Instructions: Hold while on PCA.  Start at the lowest dose.  May adjust dose by 0.1 mg every 2 hours as needed for pain control or improvement in physical function.  Hold dose for analgesic side effects.  Notify provider to assess for uncontrolled pain or analgesic side effects.  For ordered doses up to 4 mg give IV Push undiluted. Administer each 2mg over 2-5 minutes.    Admin. Amount: 0.3-0.5 mg  Dispense Loc: [facility]               lidocaine 1 % 5-10 mL  Dose: 5-10 mL  Freq: ONCE Route: ID  Start: 18 1500   Admin Instructions: For anesthetic at puncture site.  Maximum of 10 mL given per physician direction.    Admin. Amount: 5-10 mL  Dispense Loc: [facility]  Administrations Remainin  Volume: 10 mL  POC: IR Pre-procedure      (1597)-Not Given                LORazepam (ATIVAN) injection 1-4 mg  Dose: 1-4 mg  Freq: EVERY 15 MIN PRN Route: IV  PRN Reason: other  PRN Comment: Alcohol withdrawal symptoms. Frequency and dose vary according to CIWA-Ar score.  Start: 18 1540   Admin Instructions: For Score less than 4 give NO lorazepam and repeat scale in 2 hours and prn  For Score 4-6 give lorazepam 1 mg IV and repeat scale in 1 hour.  For Score 7-9 give lorazepam 2 mg IV and repeat scale in 1 hour.  For Score 10 - 14 give lorazepam 2 mg IV and repeat scale in 30 minutes.  For Score greater than 14 give lorazepam 4 mg IV and repeat scale in 15 minutes.  For IV PUSH: Dilute with equal volume of NS. For ordered doses up to 4  mg give IV Push. Administer each 2mg over 1-5 minutes.    Admin. Amount: 1-4 mg = 0.5-2 mL Conc: 2 mg/mL  Dispense Loc: [facility]  Volume: 2 mL               magnesium citrate solution 148 mL  Dose: 148 mL  Freq: ONCE PRN Route: PO  PRN Reason: constipation  Start: 18 1539   Admin Instructions: May repeat in one hour x 1 if insufficient results. Avoid in severe renal disease. Hold for loose stools.  This is the third step of a three step constipation treatment.    Admin. Amount: 148 mL  Dispense Loc: [facility]  Administrations Remainin  Volume: 296 mL               naloxone (NARCAN) injection 0.1-0.4 mg  Dose: 0.1-0.4 mg  Freq: EVERY 2 MIN PRN Route: IV  PRN Reason: opioid reversal  Start: 18 1536   Admin Instructions: For respiratory rate LESS than or EQUAL to 8.  Partial reversal dose:  0.1 mg titrated q 2 minutes for Analgesia Side Effects Monitoring Sedation Level of 3 (frequently drowsy, arousable, drifts to sleep during conversation).Full reversal dose:  0.4 mg bolus for Analgesia Side Effects Monitoring Sedation Level of 4 (somnolent, minimal or no response to stimulation).  For ordered doses up to 2mg give IVP. Give each 0.4mg over 15 seconds in emergency situations. For non-emergent situations further dilute in 9mL of NS to facilitate titration of response.    Admin. Amount: 0.1-0.4 mg = 0.25-1 mL Conc: 0.4 mg/mL  Dispense Loc: [facility]  Volume: 1 mL               nicotine polacrilex (NICORETTE) gum 2 mg  Dose: 2 mg  Freq: EVERY 1 HOUR PRN Route: BU  PRN Reason: smoking cessation  Start: 18 1543   Admin Instructions: Gum should be chewed slowly until it tingles, then placed between cheek and gum:  when tingle gone, repeat process until tingle gone (about 30 minutes).    Admin. Amount: 1 each (1 × 2 mg each)  Dispense Loc: [facility]               ondansetron (ZOFRAN-ODT) ODT tab 4 mg  Dose: 4 mg  Freq: EVERY 6 HOURS PRN Route: PO  PRN Reasons: nausea,vomiting  Start:  06/12/18 1519   Admin Instructions: This is Step 1 of nausea and vomiting management.  If nausea not resolved in 15 minutes, go to Step 2 prochlorperazine (COMPAZINE). Do not push through foil backing. Peel back foil and gently remove. Place on tongue immediately. Administration with liquid unnecessary  With dry hands, peel back foil backing and gently remove tablet; do not push oral disintegrating tablet through foil backing; administer immediately on tongue and oral disintegrating tablet dissolves in seconds; then swallow with saliva; liquid not required.    Admin. Amount: 1 tablet (1 × 4 mg tablet)  Dispense Loc: [facility]              Or  ondansetron (ZOFRAN) injection 4 mg  Dose: 4 mg  Freq: EVERY 6 HOURS PRN Route: IV  PRN Reasons: nausea,vomiting  Start: 06/12/18 1519   Admin Instructions: This is Step 1 of nausea and vomiting management.  If nausea not resolved in 15 minutes, go to Step 2 prochlorperazine (COMPAZINE).  Irritant. For ordered doses up to 4 mg, give IV Push undiluted over 2-5 minutes.    Admin. Amount: 4 mg = 2 mL Conc: 4 mg/2 mL  Dispense Loc: [facility]  Infused Over: 2-5 Minutes  Volume: 2 mL               polyethylene glycol (MIRALAX/GLYCOLAX) Packet 17 g  Dose: 17 g  Freq: DAILY Route: PO  Start: 06/16/18 0815   Admin Instructions: 1 Packet = 17 grams. Mixed prescribed dose in 8 ounces of water. Follow with 8 oz. of water.    Admin. Amount: 17 g  Last Admin: 06/17/18 0832  Dispense Loc: [facility]         0843 (17 g)-Given        0832 (17 g)-Given        (0837)-Not Given           polyethylene glycol (MIRALAX/GLYCOLAX) Packet 17 g  Dose: 17 g  Freq: DAILY PRN Route: PO  PRN Reason: constipation  Start: 06/12/18 1539   Admin Instructions: Give in 8oz of  water, juice, or soda. Hold for loose stools.  This is the second step of a three step constipation treatment.  1 Packet = 17 grams. Mixed prescribed dose in 8 ounces of water. Follow with 8 oz. of water.    Admin. Amount: 17 g  Dispense  Loc: [facility]               prochlorperazine (COMPAZINE) injection 10 mg  Dose: 10 mg  Freq: EVERY 6 HOURS PRN Route: IV  PRN Reasons: nausea,vomiting  Start: 06/12/18 1519   Admin Instructions: This is Step 2 of nausea and vomiting management. Give if nausea not resolved 15 minutes after giving ondansetron (ZOFRAN).  If nausea not resolved in 15 minutes, go to Step 3 metoclopramide (REGLAN), if ordered.  For ordered doses up to 10 mg, give IV Push undiluted. Each 5mg over 1 minute.    Admin. Amount: 10 mg = 2 mL Conc: 5 mg/mL  Dispense Loc: [facility]  Infused Over: 1-2 Minutes  Volume: 2 mL              Or  prochlorperazine (COMPAZINE) tablet 10 mg  Dose: 10 mg  Freq: EVERY 6 HOURS PRN Route: PO  PRN Reason: vomiting  Start: 06/12/18 1519   Admin Instructions: This is Step 2 of nausea and vomiting management. Give if nausea not resolved 15 minutes after giving ondansetron (ZOFRAN).  If nausea not resolved in 15 minutes, go to Step 3 metoclopramide (REGLAN), if ordered.    Admin. Amount: 1 tablet (1 × 10 mg tablet)  Dispense Loc: [facility]              Or  prochlorperazine (COMPAZINE) Suppository 25 mg  Dose: 25 mg  Freq: EVERY 12 HOURS PRN Route: RE  PRN Reasons: nausea,vomiting  Start: 06/12/18 1519   Admin Instructions: This is Step 2 of nausea and vomiting management. Give if nausea not resolved 15 minutes after giving ondansetron (ZOFRAN).  If nausea not resolved in 15 minutes, go to Step 3 metoclopramide (REGLAN), if ordered.    Admin. Amount: 1 suppository (1 × 25 mg suppository)  Dispense Loc: [facility]               sodium chloride (PF) 0.9% PF flush 3 mL  Dose: 3 mL  Freq: EVERY 8 HOURS Route: IK  Start: 06/12/18 1945   Admin Instructions: And Q1H PRN, to lock peripheral IV dormant line.    Admin. Amount: 3 mL  Last Admin: 06/18/18 0838  Dispense Loc: [facility]  Volume: 3 mL     1932 (3 mL)-Given       2358 (3 mL)-Given        0904 (3 mL)-Given       1554 (3 mL)-Given        (0119)-Not  Given       0820 (3 mL)-Given       (1731)-Not Given [C]       (2330)-Not Given        (0829)-Not Given       1730 (3 mL)-Given        0109 (3 mL)-Given       0829 (3 mL)-Given       (1720)-Not Given        (0130)-Not Given       0841 (3 mL)-Given               0137 (3 mL)-Given       0838 (3 mL)-Given       [ ] 1600           sodium chloride (PF) 0.9% PF flush 3 mL  Dose: 3 mL  Freq: EVERY 1 HOUR PRN Route: IK  PRN Reason: line flush  Start: 18 193   Admin Instructions: for peripheral IV flush post IV meds    Admin. Amount: 3 mL  Dispense Loc: [facility]  Volume: 3 mL               spironolactone (ALDACTONE) tablet 25 mg  Dose: 25 mg  Freq: DAILY Route: PO  Start: 18 0945   Admin. Amount: 1 tablet (1 × 25 mg tablet)  Last Admin: 18 0838  Dispense Loc: [facility]       1113 (25 mg)-Given        0829 (25 mg)-Given        0828 (25 mg)-Given        0833 (25 mg)-Given        0838 (25 mg)-Given           thiamine (B-1) 250 mg in sodium chloride 0.9 % 50 mL intermittent infusion  Dose: 250 mg  Freq: DAILY Route: IV  Last Dose: 250 mg (18 0838)  Start: 18 0800   Admin. Amount: 250 mg  Last Admin: 18 0838  Dispense Loc: [facility]  Infused Over: 30 Minutes  Volume: 50 mL   Mixture Administration Information:   Medication Type Amount   thiamine 100 MG/ML SOLN Medications 250 mg   sodium chloride 0.9 % SOLN Base 50 mL               0820 (250 mg)-New Bag        0829 (250 mg)-New Bag        0829 (250 mg)-New Bag        0838 (250 mg)-New Bag        0838 (250 mg)-New Bag          Completed Medications  Medications 06/12/18 06/13/18 06/14/18 06/15/18 06/16/18 06/17/18 06/18/18         Dose: 250 mL  Freq: ONCE Route: IV  Last Dose: 250 mL (18)  Start: 18   End: 18   Admin. Amount: 250 mL  Last Admin: 18  Dispense Loc: [facility]  Infused Over: 1 Hours  Administrations Remainin  Volume: 250 mL          (250 mL)-New  Bag               Dose: 250 mL  Freq: ONCE Route: IV  Last Dose: 250 mL (06/15/18 1805)  Start: 06/15/18 1815   End: 06/15/18 1905   Admin. Amount: 250 mL  Last Admin: 06/15/18 1805  Dispense Loc: [facility]  Infused Over: 1 Hours  Administrations Remainin  Volume: 250 mL        1805 (250 mL)-New Bag

## 2018-06-12 NOTE — PROGRESS NOTES
Pt reports that her last drink was yesterday, 6-11-18.  She states she drinks 2 jes and petersen a day.

## 2018-06-12 NOTE — IP AVS SNAPSHOT
"` `           [facility] INTENSIVE CARE: [phone]                                              INTERAGENCY TRANSFER FORM - NURSING   2018                    Hospital Admission Date: 2018  KAR RINCON   : 1955  Sex: Female        Attending Provider: Mary Ellen Mazariegos MD     Allergies:  No Known Allergies    Infection:  None   Service:  ICU    Ht:  1.6 m (5' 3\")   Wt:  65.6 kg (144 lb 10 oz)   Admission Wt:  76.2 kg (168 lb)    BMI:  25.62 kg/m 2   BSA:  1.71 m 2            Patient PCP Information     Provider PCP Type    Tyler Khalil MD General      Current Code Status     Date Active Code Status Order ID Comments User Context       Prior      Code Status History     Date Active Date Inactive Code Status Order ID Comments User Context    2018 11:57 AM  Full Code [id number]  Mary Ellen Mazariegos MD Outpatient    2018  3:44 PM 2018 11:57 AM Full Code [id number]  Concepción Palmer, PAVADIM Inpatient      Advance Directives        Scanned docmt in ACP Activity?           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    Tobacco use disorder Medium  2007 Yes    Hyperlipidemia Medium  10/25/2007 Yes    Inflammatory disease of breast Medium  10/31/2007 Yes    Ascites Medium  2010 Yes    Portal hypertension (H) Medium  2010 Yes    Alcohol dependence (H) Medium  3/9/2012 Yes    Cirrhosis of liver with ascites, unspecified hepatic cirrhosis type (H) Medium  2018 Yes    * (Principal)Hyponatremia Medium  2018 Yes    Abnormal LFTs Medium  2018 Yes    Elevated INR Medium  2018 Yes    Leukocytosis Medium  2018 Yes      Non-Hospital Problems as of 2018              Priority Class Noted    Major depressive disorder, single episode, mild (H) Medium  10/19/2006    Elevated blood pressure reading without diagnosis of hypertension Medium  " "9/25/2007    HYPERLIPIDEMIA LDL GOAL <100 Medium  10/31/2010      Immunizations     Name Date      Pneumococcal 23 valent 08/12/10     TD (ADULT, 7+) 02/19/03          END      ASSESSMENT     Discharge Profile Flowsheet     EXPECTED DISCHARGE     SKIN      Expected Discharge Date  06/18/18 06/17/18 0844   Inspection of bony prominences  Full 06/17/18 2239    DISCHARGE NEEDS ASSESSMENT     Inspection under devices  Full 06/17/18 1525    # of Referrals Placed by CTS  Internal Clinic Care Coordination;Post Acute Facilities;Homecare 06/14/18 1339   Skin WDL  WDL 06/18/18 0836    Primary Care Clinic Name  [facility] 06/14/18 1338   Skin Temperature  warm 06/17/18 2239    Primary Care MD Name  Dr. Khalil 06/14/18 1338   Skin Moisture  dry 06/17/18 2239    GASTROINTESTINAL (ADULT,PEDIATRIC,OB)     Skin Elasticity  quick return to original state 06/17/18 2239    GI WDL  WDL 06/18/18 0836   Skin Integrity  bruise(s) 06/17/18 2239    Abdominal Appearance  distended;rounded 06/17/18 2239   SAFETY      All Quadrants Bowel Sounds  audible and normoactive 06/17/18 2239   Safety WDL  WDL 06/18/18 0836    Last Bowel Movement  06/17/18 06/18/18 0836   Safety Factors  bed in low position;call light in reach;wheels locked;upper side rails raised x 2;ID band on 06/18/18 0836    Passing flatus  yes 06/18/18 0836   Safety Equipment  oxygen flowmeter;obturator at bedside;suction equipment 06/16/18 1717    COMMUNICATION ASSESSMENT     All Alarms  alarm(s) activated and audible 06/18/18 0836    Patient's communication style  spoken language (English or Bilingual) 06/12/18 1018                      Assessment WDL (Within Defined Limits) Definitions           Safety WDL     Effective: 09/28/15    Row Information: <b>WDL Definition:</b> Bed in low position, wheels locked; call light in reach; upper side rails up x 2; ID band on<br> <font color=\"gray\"><i>Item=AS safety wdl>>List=AS safety wdl>>Version=F14</i></font>      Skin WDL     " "Effective: 09/28/15    Row Information: <b>WDL Definition:</b> Warm; dry; intact; elastic; without discoloration; pressure points without redness<br> <font color=\"gray\"><i>Item=AS skin wdl>>List=AS skin wdl>>Version=F14</i></font>      Vitals     Vital Signs Flowsheet     VITAL SIGNS     Positioning/Transfer Devices  pillows 06/17/18 2239    Temp  98.2  F (36.8  C) 06/18/18 0320   Chair  Upright in chair 06/17/18 1525    Temp src  Oral 06/18/18 0320   DAILY CARE      Resp  18 06/18/18 0837   Activity Management  ambulated to bathroom 06/18/18 0955    Pulse  96 06/18/18 0955   Activity Assistance Provided  assistance, stand-by 06/18/18 0955    Heart Rate  102 06/17/18 1225   Assistive Device Utilized  walker 06/18/18 0955    Pulse/Heart Rate Source  Monitor 06/17/18 1534   Additional Documentation  Activity Device Assistance (Row) 06/15/18 1157    BP  95/58 06/18/18 0837   ECG      BP Location  Left arm 06/18/18 0837   ECG Rhythm  Sinus rhythm 06/15/18 0736    OXYGEN THERAPY     Ectopy  None 06/15/18 0736    SpO2  96 % 06/18/18 0837   AK Interval  0.14 06/15/18 0736    O2 Device  None (Room air) 06/18/18 0837   QRS Interval  0.08 06/15/18 0736    PAIN/COMFORT     QT Interval  0.32 06/15/18 0736    Patient Currently in Pain  denies 06/18/18 0837   Lead Monitored  Lead II 06/15/18 0736    HEIGHT AND WEIGHT     HARLEY COMA SCALE      Height  1.6 m (5' 3\") 06/12/18 1019   Best Eye Response  4-->(E4) spontaneous 06/18/18 0836    Weight  65.6 kg (144 lb 10 oz) 06/18/18 0318   Best Motor Response  6-->(M6) obeys commands 06/18/18 0836    Weight Method  Bed scale 06/18/18 0318   Best Verbal Response  5-->(V5) oriented 06/18/18 0836    BSA (Calculated - sq m)  1.84 06/12/18 1019   Gay Coma Scale Score  15 06/18/18 0836    BMI (Calculated)  29.82 06/12/18 1019   POINT OF CARE TESTING      POSITIONING     Puncture Site  fingertip 06/13/18 0813    Body Position  supine 06/18/18 0836   Bedside Glucose (mg/dl )   109 " mg/dl 06/13/18 0813    Head of Bed (HOB)  HOB at 30-45 degrees 06/18/18 0836                 Patient Lines/Drains/Airways Status    Active LINES/DRAINS/AIRWAYS     None            Patient Lines/Drains/Airways Status    Active PICC/CVC     None            Intake/Output Detail Report     Date Intake         Output   Net    Shift P.O. I.V. IV Piggyback Colloid Blood Components Total Urine Drains Total       Noc 06/16/18 2300 - 06/17/18 0659 -- -- -- -- -- -- -- -- -- 0    Day 06/17/18 0700 - 06/17/18 1459 600 -- -- -- 320 920 200 -- 200 720    Cony 06/17/18 1500 - 06/17/18 2259 -- -- -- -- -- -- -- -- -- 0    Noc 06/17/18 2300 - 06/18/18 0659 50 -- -- -- -- 50 -- -- -- 50    Day 06/18/18 0700 - 06/18/18 1459 540 -- -- -- -- 540 0 -- -- 540      Last Void/BM       Most Recent Value    Urine Occurrence 1 at 06/18/2018 1230    Stool Occurrence 1 at 06/18/2018 1230      Case Management/Discharge Planning     Case Management/Discharge Planning Flowsheet     REFERRAL INFORMATION     Who is your support system?  Sibling(s) 06/14/18 1339    Admission Type  inpatient 06/14/18 1338   EMPLOYMENT      Arrived From  home or self-care 06/14/18 1338   Do you work full or part-time?  no 06/14/18 1339    Referral Source  interdisciplinary rounds 06/14/18 1338   COPING/STRESS      # of Referrals Placed by CTS  Internal Clinic Care Coordination;Post Acute Facilities;Homecare 06/14/18 1339   Major Change/Loss/Stressor  none 06/12/18 1532    Reason For Consult  discharge planning 06/14/18 1338   EXPECTED DISCHARGE      CTS Assigned to Case  Susie 06/14/18 1338   Expected Discharge Date  06/18/18 06/17/18 0844    Primary Care Clinic Name  [facility] 06/14/18 1338   ABUSE RISK SCREEN      Primary Care MD Name  Dr. Khalil 06/14/18 1338   QUESTION TO PATIENT:  Has a member of your family or a partner(now or in the past) intimidated, hurt, manipulated, or controlled you in any way?  no 06/12/18 1532    LIVING ENVIRONMENT     QUESTION TO  PATIENT: Do you feel safe going back to the place where you are living?  yes 06/12/18 1532    Lives With  alone 06/14/18 1527   OBSERVATION: Is there reason to believe there has been maltreatment of a vulnerable adult (ie. Physical/Sexual/Emotional abuse, self neglect, lack of adequate food, shelter, medical care, or financial exploitation)?  no 06/12/18 1532    Living Arrangements  apartment 06/14/18 1527   OTHER      Provides Primary Care For  no one 06/14/18 1339   Are you depressed or being treated for depression?  No 06/12/18 1532    ASSESSMENT OF FAMILY/SOCIAL SUPPORT     HOMICIDE RISK      Marital Status  Single 06/14/18 1339   Feels Like Hurting Others  no 06/12/18 1532

## 2018-06-13 ENCOUNTER — APPOINTMENT (OUTPATIENT)
Dept: ULTRASOUND IMAGING | Facility: CLINIC | Age: 63
DRG: 432 | End: 2018-06-13
Attending: INTERNAL MEDICINE
Payer: MEDICARE

## 2018-06-13 ENCOUNTER — APPOINTMENT (OUTPATIENT)
Dept: CARDIOLOGY | Facility: CLINIC | Age: 63
DRG: 432 | End: 2018-06-13
Attending: INTERNAL MEDICINE
Payer: MEDICARE

## 2018-06-13 LAB
ALBUMIN FLD-MCNC: 0.6 G/DL
ALBUMIN SERPL-MCNC: 2 G/DL (ref 3.4–5)
ALBUMIN SERPL-MCNC: 2.1 G/DL (ref 3.4–5)
ALP SERPL-CCNC: 361 U/L (ref 40–150)
ALT SERPL W P-5'-P-CCNC: 18 U/L (ref 0–50)
ANION GAP SERPL CALCULATED.3IONS-SCNC: 10 MMOL/L (ref 3–14)
AST SERPL W P-5'-P-CCNC: 150 U/L (ref 0–45)
BILIRUB SERPL-MCNC: 1.3 MG/DL (ref 0.2–1.3)
BUN SERPL-MCNC: 16 MG/DL (ref 7–30)
CALCIUM SERPL-MCNC: 7.6 MG/DL (ref 8.5–10.1)
CHLORIDE SERPL-SCNC: 82 MMOL/L (ref 94–109)
CO2 SERPL-SCNC: 24 MMOL/L (ref 20–32)
CORTIS SERPL-MCNC: 34.3 UG/DL (ref 4–22)
CREAT SERPL-MCNC: 0.57 MG/DL (ref 0.52–1.04)
ERYTHROCYTE [DISTWIDTH] IN BLOOD BY AUTOMATED COUNT: 14.4 % (ref 10–15)
GFR SERPL CREATININE-BSD FRML MDRD: >90 ML/MIN/1.7M2
GLUCOSE BLDC GLUCOMTR-MCNC: 104 MG/DL (ref 70–99)
GLUCOSE BLDC GLUCOMTR-MCNC: 109 MG/DL (ref 70–99)
GLUCOSE BLDC GLUCOMTR-MCNC: 119 MG/DL (ref 70–99)
GLUCOSE SERPL-MCNC: 91 MG/DL (ref 70–99)
GRAM STN SPEC: NORMAL
GRAM STN SPEC: NORMAL
HCT VFR BLD AUTO: 20.7 % (ref 35–47)
HGB BLD-MCNC: 7.3 G/DL (ref 11.7–15.7)
INR PPP: 1.44 (ref 0.86–1.14)
LACTATE BLD-SCNC: 1.5 MMOL/L (ref 0.4–1.9)
Lab: NORMAL
MCH RBC QN AUTO: 33.3 PG (ref 26.5–33)
MCHC RBC AUTO-ENTMCNC: 35.3 G/DL (ref 31.5–36.5)
MCV RBC AUTO: 95 FL (ref 78–100)
PLATELET # BLD AUTO: 298 10E9/L (ref 150–450)
POTASSIUM SERPL-SCNC: 3.7 MMOL/L (ref 3.4–5.3)
PROT FLD-MCNC: 1.2 G/DL
PROT SERPL-MCNC: 5.8 G/DL (ref 6.8–8.8)
RBC # BLD AUTO: 2.19 10E12/L (ref 3.8–5.2)
SODIUM SERPL-SCNC: 115 MMOL/L (ref 133–144)
SODIUM SERPL-SCNC: 116 MMOL/L (ref 133–144)
SODIUM SERPL-SCNC: 117 MMOL/L (ref 133–144)
SODIUM SERPL-SCNC: 117 MMOL/L (ref 133–144)
SPECIMEN SOURCE FLD: NORMAL
SPECIMEN SOURCE FLD: NORMAL
SPECIMEN SOURCE: NORMAL
WBC # BLD AUTO: 12.5 10E9/L (ref 4–11)

## 2018-06-13 PROCEDURE — 25000125 ZZHC RX 250: Performed by: RADIOLOGY

## 2018-06-13 PROCEDURE — 85610 PROTHROMBIN TIME: CPT | Performed by: PHYSICIAN ASSISTANT

## 2018-06-13 PROCEDURE — 80053 COMPREHEN METABOLIC PANEL: CPT | Performed by: PHYSICIAN ASSISTANT

## 2018-06-13 PROCEDURE — 00000146 ZZHCL STATISTIC GLUCOSE BY METER IP

## 2018-06-13 PROCEDURE — 25000128 H RX IP 250 OP 636: Performed by: RADIOLOGY

## 2018-06-13 PROCEDURE — 93306 TTE W/DOPPLER COMPLETE: CPT | Mod: 26 | Performed by: INTERNAL MEDICINE

## 2018-06-13 PROCEDURE — 85027 COMPLETE CBC AUTOMATED: CPT | Performed by: PHYSICIAN ASSISTANT

## 2018-06-13 PROCEDURE — 36415 COLL VENOUS BLD VENIPUNCTURE: CPT | Performed by: INTERNAL MEDICINE

## 2018-06-13 PROCEDURE — A9270 NON-COVERED ITEM OR SERVICE: HCPCS | Mod: GY | Performed by: PHYSICIAN ASSISTANT

## 2018-06-13 PROCEDURE — 25000128 H RX IP 250 OP 636: Performed by: INTERNAL MEDICINE

## 2018-06-13 PROCEDURE — P9047 ALBUMIN (HUMAN), 25%, 50ML: HCPCS | Performed by: INTERNAL MEDICINE

## 2018-06-13 PROCEDURE — 0W9G3ZZ DRAINAGE OF PERITONEAL CAVITY, PERCUTANEOUS APPROACH: ICD-10-PCS | Performed by: RADIOLOGY

## 2018-06-13 PROCEDURE — P9047 ALBUMIN (HUMAN), 25%, 50ML: HCPCS | Performed by: RADIOLOGY

## 2018-06-13 PROCEDURE — 27210190 US PARACENTESIS

## 2018-06-13 PROCEDURE — 25000132 ZZH RX MED GY IP 250 OP 250 PS 637: Mod: GY | Performed by: PHYSICIAN ASSISTANT

## 2018-06-13 PROCEDURE — 89051 BODY FLUID CELL COUNT: CPT | Performed by: INTERNAL MEDICINE

## 2018-06-13 PROCEDURE — 76705 ECHO EXAM OF ABDOMEN: CPT

## 2018-06-13 PROCEDURE — 84157 ASSAY OF PROTEIN OTHER: CPT | Performed by: INTERNAL MEDICINE

## 2018-06-13 PROCEDURE — 87075 CULTR BACTERIA EXCEPT BLOOD: CPT | Performed by: INTERNAL MEDICINE

## 2018-06-13 PROCEDURE — 36415 COLL VENOUS BLD VENIPUNCTURE: CPT | Performed by: PHYSICIAN ASSISTANT

## 2018-06-13 PROCEDURE — 83605 ASSAY OF LACTIC ACID: CPT | Performed by: INTERNAL MEDICINE

## 2018-06-13 PROCEDURE — 84295 ASSAY OF SERUM SODIUM: CPT | Performed by: PHYSICIAN ASSISTANT

## 2018-06-13 PROCEDURE — 87205 SMEAR GRAM STAIN: CPT | Performed by: INTERNAL MEDICINE

## 2018-06-13 PROCEDURE — 93306 TTE W/DOPPLER COMPLETE: CPT

## 2018-06-13 PROCEDURE — 82042 OTHER SOURCE ALBUMIN QUAN EA: CPT | Performed by: INTERNAL MEDICINE

## 2018-06-13 PROCEDURE — 99233 SBSQ HOSP IP/OBS HIGH 50: CPT | Performed by: INTERNAL MEDICINE

## 2018-06-13 PROCEDURE — 82040 ASSAY OF SERUM ALBUMIN: CPT | Performed by: INTERNAL MEDICINE

## 2018-06-13 PROCEDURE — 12000011 ZZH R&B MS OVERFLOW

## 2018-06-13 PROCEDURE — 87070 CULTURE OTHR SPECIMN AEROBIC: CPT | Performed by: INTERNAL MEDICINE

## 2018-06-13 RX ORDER — ALBUMIN (HUMAN) 12.5 G/50ML
25 SOLUTION INTRAVENOUS ONCE
Status: COMPLETED | OUTPATIENT
Start: 2018-06-13 | End: 2018-06-13

## 2018-06-13 RX ORDER — FOLIC ACID 1 MG/1
1 TABLET ORAL DAILY
Status: DISCONTINUED | OUTPATIENT
Start: 2018-06-14 | End: 2018-06-18 | Stop reason: HOSPADM

## 2018-06-13 RX ADMIN — SPIRONOLACTONE 25 MG: 25 TABLET ORAL at 09:04

## 2018-06-13 RX ADMIN — LIDOCAINE HYDROCHLORIDE 8 ML: 10 INJECTION, SOLUTION EPIDURAL; INFILTRATION; INTRACAUDAL; PERINEURAL at 14:48

## 2018-06-13 RX ADMIN — ALBUMIN HUMAN 25 G: 0.25 SOLUTION INTRAVENOUS at 15:51

## 2018-06-13 RX ADMIN — ALBUMIN HUMAN 25 G: 0.25 SOLUTION INTRAVENOUS at 19:18

## 2018-06-13 NOTE — PROGRESS NOTES
Ultrasound guided paracentesis done by radiologist of [facility] yielding 8000cc clear yellow ascitic fluild.  Pressure held to site, dressed with Dermabond and sterile bandaid.  VSS.  Report given to ICU RN.

## 2018-06-13 NOTE — PROGRESS NOTES
[facility]    Hospital Medicine Progress Note  Date of Service: 06/13/2018     Assessment & Plan   Karen Tee is a 62 year old female with a past medical history significant for alcohol dependence, cirrhosis/alcohol hepatitis with ascites 2010, dyslipidemia, depression, and tobacco dependence who presents on 6/12/2018 with unsteady gait, shortness of breath, abdominal bloating and hyponatremia.      Hyponatremia, severe   Went to clinic on 6/11/18 presenting with unsteady gait and shortness of breath, labs collected and sodium returned 112 after patient left clinic. Was called and told to go to emergency department 6/12, presented with sodium 113. Was given about 800cc's NS in emergency department, recheck sodium up to 115. Suspect hyponatremia primarily due to cirrhosis, though this is unusually low for cirrhosis and cirrhosis diagnosis is not certain (see below).    Started furosemide and spironolactone 6/12. Patient did not have much urine output. Has massive ascites needing tap, so complex situation. Sodium peaked at 117 at midnight and drifting down to 115 at noon. Held further furosemide.    Discussed 6/13 with Hepatologist who agreed with holding on further diuresis since patient to have large volume paracentesis today, continue fluid restriction, follow sodium. Suggested also that may have other etiology for hyponatremia in addition to liver disease as the degree of hyponatremia is unusual, especially in comparison to degree of liver pathology biochemically.    - Sodium checks every 6 hours. If correction > 6 mEq in 24 hours, would consider adding free water but not DDAVP since patient is likely already high in ADH. Would not expect patient's sodium to normalize, may have some low sodium chronically   - follow off diuretics for now, awaiting paracentesis which may affect sodium   - fluid restriction 1500   - check echo to rule out cardiac etiology for fluid  "retention    Suspect cirrhosis given alcohol history and marked ascites   Presented July 2010 with hyponatremia 124, marked ascites, hgb 5.1 and had been drinking heavily. CT 2010 showed hepatomegaly with fatty infiltration, EGD showed no varices, hepatitis A/B/C serologies negative and had Hep A and Hep B vaccine series following that. Seen by Dr. Adonay Glover, Hepatology, at that time. Patient required multiple large volume paracentesis July to November 2010. Patient stopped alcohol in July and after November, needed no further paracentesis. Patient did treatment at [facility] and was dry 5 years she says today. Started drinking again about 3 years ago.      Noted abdominal distention for the past 2-3 weeks. Protuberant, fluid-filled abdomen on exam but non-tender. LFT's show alcohol hepatitis pattern, decreased albumin 2.0 and Tprotein 5.8, INR 1.45 but normal platelets. Leucocytosis otherwise nothing to indicate SBP.    Discussed with hepatology today 6/13, recommended paracentesis and RUQ US with doppler.    - paracentesis today, discussed with radiologist   - check cell count, total protein, albumin, cultures   - Limited abdomen US with doppler, eval for mass or portal vein thrombosis   - discussed alcohol cessation and patient says she is agreeable    Leukocytosis  Presented with WBC 16.5, afebrile. Chest x-ray negative for infiltrate, but does show evidence of fluid overload. No abdominal pain or tenderness, so low suspicion for spontaneous bacterial peritonitis at this time. Tap pending. WBC 14  - No antibiotics      Anemia   Likely due to chronic alcohol use. Hgb 7.3. No evidence of bleeding.    - following, check studies    Elevated uric acid  Presented with uric acid 8.2. No current evidence for gout.     Alcohol dependence  Daily alcohol use, at least 6-7 oz jes per day. No known history of seizures. Risk for withdrawal during current hospitalization. 1.75 liter lasts \"nearly a week\". Last drink 6 " days ago. No evidence of withdrawal.   - [facility] protocol  - Prn Ativan use for withdrawal symptoms   - thiamine high dose   - folic acid    Tobacco use disorder  Smokes about 1/2 pack per day. Encourage cessation.  - Nicotine gum available    Chronic depressive personality disorder  Previously diagnosed. No longer taking any medications for depression. Defer to PCP for outpatient management.    Hyperlipidemia  Noted per problem list. Not currently taking any statin or other lipid lowering medication. Defer to PCP for outpatient management.      Fluids: Oral, fluid restriction  Electrolytes: Monitor  Nutrition: Regular diet    DVT Prophylaxis: Pneumatic Compression Devices  Code Status: Full Code - discussed directly with patient    Lines: Peripheral  Palm catheter: Not indicated      Discussion: Stable. Work up in progress, paracentesis today.    Disposition: Anticipate discharge 2-3 more days minimum     Attestation:  Total time: 70 minutes    Moy Mckeon MD  [facility]        Interval History   Feels a little better. No abdomen pain. Has had significant dyspnea on exertion recently but no chest pain. No fever. Has had little appetite for weeks.     Physical Exam   Temp:  [98  F (36.7  C)-99.1  F (37.3  C)] 98  F (36.7  C)  Heart Rate:  [] 99  Resp:  [11-25] 17  BP: ()/(48-64) 93/61  SpO2:  [92 %-100 %] 97 %    Weights:   Vitals:    06/12/18 1019 06/13/18 0600   Weight: 76.2 kg (168 lb) 74.1 kg (163 lb 5.8 oz)    Body mass index is 28.94 kg/(m^2).    Constitutional: alert and oriented, NAD, nontoxic  CV: Regular, 1+ bilateral lower extremity edema   Respiratory: CTA bilaterally  GI: Markedly distended, significant ascites by percussion, nontender  Skin: Warm and dry    Data     Recent Labs  Lab 06/13/18  1119 06/13/18  0527 06/13/18  0037  06/12/18  1036 06/11/18  1424   WBC  --  12.5*  --   --  16.5* 17.6*   HGB  --  7.3*  --   --  8.3* 8.5*   MCV  --  95  --   --  96 97   PLT  --  298  --   --   372 380   INR  --  1.44*  --   --   --  1.45*   * 116* 117*  < > 113* 112*   POTASSIUM  --  3.7  --   --  3.6 3.7   CHLORIDE  --  82*  --   --  78* 78*   CO2  --  24  --   --  20 16*   BUN  --  16  --   --  15 13   CR  --  0.57  --   --  0.57 0.50*   ANIONGAP  --  10  --   --  15* 18*   LYN  --  7.6*  --   --  8.6 8.5   GLC  --  91  --   --  104* 113*   ALBUMIN  --  2.0*  --   --  2.4* 2.3*   PROTTOTAL  --  5.8*  --   --  7.2 7.0   BILITOTAL  --  1.3  --   --  1.6* 1.5*   ALKPHOS  --  361*  --   --  415* 412*   ALT  --  18  --   --  20 20   AST  --  150*  --   --  148* 165*   < > = values in this interval not displayed.      Recent Labs  Lab 06/13/18  0811 06/13/18  0527 06/13/18  0439 06/12/18  2355 06/12/18  1949 06/12/18  1610 06/12/18  1036 06/11/18  1424   GLC  --  91  --   --   --   --  104* 113*   *  --  104* 119* 120* 113*  --   --         Unresulted Labs Ordered in the Past 30 Days of this Admission     No orders found for last 61 day(s).           Imaging:   Recent Results (from the past 24 hour(s))   Chest XR,  PA & LAT    Narrative    XR CHEST 2 VW 6/12/2018 1:27 PM    HISTORY: Short of breath. Weakness.    COMPARISON: 7/9/2010.      Impression    IMPRESSION: 2 views of the chest show low lung volumes. In addition,  there is a new small left pleural effusion. Pulmonary vessels are  mildly congested and slightly ill-defined with a small mild  peribronchial thickening. This raises the possibility of a small  amount interstitial pulmonary edema related to CHF.     GREG WALLIS MD        I reviewed all new labs and imaging results over the last 24 hours. I personally reviewed no images or EKG's today.    Medications       sodium chloride (PF)  3 mL Intracatheter Q8H   Reviewed meds - stopped spironolactone and furosemide    Moy Mckeon MD  [facility]

## 2018-06-13 NOTE — PROGRESS NOTES
[facility]    Hospital Medicine Follow up Note  Date of Service: 06/12/2018      Patient with poor urine output despite furosemide. Na flat at 115. No apparent acute kidney injury on admission but patient at risk for hepatorenal syndrome. Patient also high risk for ODS if replaced too fast. Suspect hyponatremia due to cirrhosis which is poor prognostic sign.    - hold further furosemide   - continue fluid restrict   - will discussed with hepatology in the morning   - plan at least diagnostic paracentesis if not therapeutic tomorrow in IR   - if sodium < 114 tonight would give 250 NS bolus and then NS at 50 mL/h  Moy Mckeon MD  [facility]

## 2018-06-13 NOTE — PROGRESS NOTES
RADIOLOGY PROCEDURE NOTE  Patient name: Karen Tee  MRN: [id number]  : 1955    Pre-procedure diagnosis: Distention.  Post-procedure diagnosis: Same    Procedure Date/Time: 2018  3:33 PM  Procedure: US guided paracentesis.  Estimated blood loss: None  Specimen(s) collected:  8 L straw colored fluid.  The patient tolerated the procedure well with no immediate complications.    See imaging dictation for procedural details.    Provider name: Hoang Lopez  Assistant(s):None

## 2018-06-13 NOTE — PROGRESS NOTES
Patient has been sleeping on and off past 2 hours has not voided despite IV Lasix dose at 2000.  Continues to have very bloated abdomen, denies any pain or distress from such shifting weight on own for skin integrity maintenance.  On fluid restriction, denies being thirsty.  BP's continue to be soft but maintaining MAP in the mid to high 60's to 70's.  HR continues to be in the 100 range.  Plan of care continues.  Awaiting any new orders with last lab results.

## 2018-06-13 NOTE — PROGRESS NOTES
Pt tolerated Albumin infusion, remains tachycardic. B/p still soft. Pt is awake, alert, oriented. Ate a few bites of dinner. To have an Abdominal US tonight.Pt denies pain, abd tap site is C,D,I.

## 2018-06-13 NOTE — PLAN OF CARE
Problem: Fluid Volume Excess (Adult)  Goal: Identify Related Risk Factors and Signs and Symptoms  Related risk factors and signs and symptoms are identified upon initiation of Human Response Clinical Practice Guideline (CPG).   Outcome: No Change  Only voided once late in night shift, gait remains slow but only slightly unsteady.  Continues to have mild edema to both lower legs and ankles but noted to be less than start of 12 hour shift.  BP's remain in the low 90's to high 80's with Map reading in the mid to high 60's.  HR remains right around 100 and slightly faster with activity.  Na levels this past 12 hours 115/117 and 116 every 6 hours.  Plan of care continues.

## 2018-06-13 NOTE — PLAN OF CARE
Problem: Alcohol Withdrawal Acute, Risk/Actual (Adult)  Goal: Signs and Symptoms of Listed Potential Problems Will be Absent, Minimized or Managed (Alcohol Withdrawal Acute, Risk/Actual)  Signs and symptoms of listed potential problems will be absent, minimized or managed by discharge/transition of care (reference Alcohol Withdrawal Acute, Risk/Actual (Adult) CPG).   Outcome: No Change  Pt has generalized weakness and slightly unbalanced gait, does ambulate with stand-by assistance, needs help sitting up due to abdominal ascites.  Denies headache and dizziness.  Speech is slightly slurred.

## 2018-06-13 NOTE — PROGRESS NOTES
Pt returned from paracentesis, band aid over right tap site. Abdomen is distended although less so. Soft to touch, pt denies pain. Monitoring VS q 15 minutes. Albumin to be infused.

## 2018-06-14 ENCOUNTER — APPOINTMENT (OUTPATIENT)
Dept: PHYSICAL THERAPY | Facility: CLINIC | Age: 63
DRG: 432 | End: 2018-06-14
Payer: MEDICARE

## 2018-06-14 ENCOUNTER — APPOINTMENT (OUTPATIENT)
Dept: OCCUPATIONAL THERAPY | Facility: CLINIC | Age: 63
DRG: 432 | End: 2018-06-14
Payer: MEDICARE

## 2018-06-14 LAB
ALBUMIN SERPL-MCNC: 2.7 G/DL (ref 3.4–5)
ALP SERPL-CCNC: 323 U/L (ref 40–150)
ALT SERPL W P-5'-P-CCNC: 13 U/L (ref 0–50)
ANION GAP SERPL CALCULATED.3IONS-SCNC: 12 MMOL/L (ref 3–14)
ANION GAP SERPL CALCULATED.3IONS-SCNC: 8 MMOL/L (ref 3–14)
AST SERPL W P-5'-P-CCNC: 152 U/L (ref 0–45)
BILIRUB SERPL-MCNC: 1.3 MG/DL (ref 0.2–1.3)
BUN SERPL-MCNC: 13 MG/DL (ref 7–30)
BUN SERPL-MCNC: 13 MG/DL (ref 7–30)
CALCIUM SERPL-MCNC: 7.8 MG/DL (ref 8.5–10.1)
CALCIUM SERPL-MCNC: 8.3 MG/DL (ref 8.5–10.1)
CHLORIDE SERPL-SCNC: 84 MMOL/L (ref 94–109)
CHLORIDE SERPL-SCNC: 84 MMOL/L (ref 94–109)
CO2 SERPL-SCNC: 23 MMOL/L (ref 20–32)
CO2 SERPL-SCNC: 25 MMOL/L (ref 20–32)
CREAT SERPL-MCNC: 0.56 MG/DL (ref 0.52–1.04)
CREAT SERPL-MCNC: 0.6 MG/DL (ref 0.52–1.04)
CREAT UR-MCNC: 70 MG/DL
ERYTHROCYTE [DISTWIDTH] IN BLOOD BY AUTOMATED COUNT: 14.7 % (ref 10–15)
FERRITIN SERPL-MCNC: 761 NG/ML (ref 8–252)
FRACT EXCRET NA UR+SERPL-RTO: 0.1 %
GFR SERPL CREATININE-BSD FRML MDRD: >90 ML/MIN/1.7M2
GFR SERPL CREATININE-BSD FRML MDRD: >90 ML/MIN/1.7M2
GLUCOSE SERPL-MCNC: 102 MG/DL (ref 70–99)
GLUCOSE SERPL-MCNC: 105 MG/DL (ref 70–99)
HCT VFR BLD AUTO: 20.6 % (ref 35–47)
HGB BLD-MCNC: 7.1 G/DL (ref 11.7–15.7)
IRON SATN MFR SERPL: 65 % (ref 15–46)
IRON SERPL-MCNC: 75 UG/DL (ref 35–180)
MCH RBC QN AUTO: 33 PG (ref 26.5–33)
MCHC RBC AUTO-ENTMCNC: 34.5 G/DL (ref 31.5–36.5)
MCV RBC AUTO: 96 FL (ref 78–100)
PLATELET # BLD AUTO: 281 10E9/L (ref 150–450)
POTASSIUM SERPL-SCNC: 3.6 MMOL/L (ref 3.4–5.3)
POTASSIUM SERPL-SCNC: 3.8 MMOL/L (ref 3.4–5.3)
PROT SERPL-MCNC: 6.3 G/DL (ref 6.8–8.8)
RBC # BLD AUTO: 2.15 10E12/L (ref 3.8–5.2)
SODIUM SERPL-SCNC: 117 MMOL/L (ref 133–144)
SODIUM SERPL-SCNC: 118 MMOL/L (ref 133–144)
SODIUM SERPL-SCNC: 118 MMOL/L (ref 133–144)
SODIUM SERPL-SCNC: 119 MMOL/L (ref 133–144)
SODIUM SERPL-SCNC: 120 MMOL/L (ref 133–144)
SODIUM SERPL-SCNC: 121 MMOL/L (ref 133–144)
SODIUM UR-SCNC: 14 MMOL/L
TIBC SERPL-MCNC: 115 UG/DL (ref 240–430)
WBC # BLD AUTO: 11.1 10E9/L (ref 4–11)

## 2018-06-14 PROCEDURE — 25000132 ZZH RX MED GY IP 250 OP 250 PS 637: Mod: GY | Performed by: FAMILY MEDICINE

## 2018-06-14 PROCEDURE — 83540 ASSAY OF IRON: CPT | Performed by: INTERNAL MEDICINE

## 2018-06-14 PROCEDURE — 97165 OT EVAL LOW COMPLEX 30 MIN: CPT | Mod: GO

## 2018-06-14 PROCEDURE — 80048 BASIC METABOLIC PNL TOTAL CA: CPT | Performed by: FAMILY MEDICINE

## 2018-06-14 PROCEDURE — 97116 GAIT TRAINING THERAPY: CPT | Mod: GP | Performed by: PHYSICAL THERAPIST

## 2018-06-14 PROCEDURE — 80053 COMPREHEN METABOLIC PANEL: CPT | Performed by: INTERNAL MEDICINE

## 2018-06-14 PROCEDURE — 36415 COLL VENOUS BLD VENIPUNCTURE: CPT | Performed by: INTERNAL MEDICINE

## 2018-06-14 PROCEDURE — 82607 VITAMIN B-12: CPT | Performed by: FAMILY MEDICINE

## 2018-06-14 PROCEDURE — A9270 NON-COVERED ITEM OR SERVICE: HCPCS | Mod: GY | Performed by: FAMILY MEDICINE

## 2018-06-14 PROCEDURE — 82728 ASSAY OF FERRITIN: CPT | Performed by: INTERNAL MEDICINE

## 2018-06-14 PROCEDURE — 12000011 ZZH R&B MS OVERFLOW

## 2018-06-14 PROCEDURE — 99233 SBSQ HOSP IP/OBS HIGH 50: CPT | Performed by: FAMILY MEDICINE

## 2018-06-14 PROCEDURE — 97161 PT EVAL LOW COMPLEX 20 MIN: CPT | Mod: GP | Performed by: PHYSICAL THERAPIST

## 2018-06-14 PROCEDURE — 84300 ASSAY OF URINE SODIUM: CPT | Performed by: FAMILY MEDICINE

## 2018-06-14 PROCEDURE — 85027 COMPLETE CBC AUTOMATED: CPT | Performed by: INTERNAL MEDICINE

## 2018-06-14 PROCEDURE — 83550 IRON BINDING TEST: CPT | Performed by: INTERNAL MEDICINE

## 2018-06-14 PROCEDURE — 97535 SELF CARE MNGMENT TRAINING: CPT | Mod: GO

## 2018-06-14 PROCEDURE — A9270 NON-COVERED ITEM OR SERVICE: HCPCS | Mod: GY | Performed by: INTERNAL MEDICINE

## 2018-06-14 PROCEDURE — 25000128 H RX IP 250 OP 636: Performed by: INTERNAL MEDICINE

## 2018-06-14 PROCEDURE — 84295 ASSAY OF SERUM SODIUM: CPT | Performed by: INTERNAL MEDICINE

## 2018-06-14 PROCEDURE — 25000132 ZZH RX MED GY IP 250 OP 250 PS 637: Mod: GY | Performed by: INTERNAL MEDICINE

## 2018-06-14 PROCEDURE — 84295 ASSAY OF SERUM SODIUM: CPT | Performed by: FAMILY MEDICINE

## 2018-06-14 PROCEDURE — 40000133 ZZH STATISTIC OT WARD VISIT

## 2018-06-14 PROCEDURE — 40000193 ZZH STATISTIC PT WARD VISIT: Performed by: PHYSICAL THERAPIST

## 2018-06-14 PROCEDURE — 25000128 H RX IP 250 OP 636: Performed by: FAMILY MEDICINE

## 2018-06-14 PROCEDURE — 82570 ASSAY OF URINE CREATININE: CPT | Performed by: FAMILY MEDICINE

## 2018-06-14 PROCEDURE — 97110 THERAPEUTIC EXERCISES: CPT | Mod: GP | Performed by: PHYSICAL THERAPIST

## 2018-06-14 PROCEDURE — 36415 COLL VENOUS BLD VENIPUNCTURE: CPT | Performed by: FAMILY MEDICINE

## 2018-06-14 RX ORDER — SPIRONOLACTONE 25 MG/1
25 TABLET ORAL DAILY
Status: DISCONTINUED | OUTPATIENT
Start: 2018-06-14 | End: 2018-06-18 | Stop reason: HOSPADM

## 2018-06-14 RX ORDER — FUROSEMIDE 10 MG/ML
20 INJECTION INTRAMUSCULAR; INTRAVENOUS EVERY 6 HOURS
Status: COMPLETED | OUTPATIENT
Start: 2018-06-14 | End: 2018-06-14

## 2018-06-14 RX ADMIN — THIAMINE HYDROCHLORIDE 250 MG: 100 INJECTION, SOLUTION INTRAMUSCULAR; INTRAVENOUS at 08:20

## 2018-06-14 RX ADMIN — FOLIC ACID 1 MG: 1 TABLET ORAL at 08:20

## 2018-06-14 RX ADMIN — SPIRONOLACTONE 25 MG: 25 TABLET ORAL at 11:13

## 2018-06-14 RX ADMIN — FUROSEMIDE 20 MG: 10 INJECTION, SOLUTION INTRAVENOUS at 21:12

## 2018-06-14 RX ADMIN — FUROSEMIDE 20 MG: 10 INJECTION, SOLUTION INTRAVENOUS at 16:06

## 2018-06-14 RX ADMIN — FUROSEMIDE 20 MG: 10 INJECTION, SOLUTION INTRAVENOUS at 11:13

## 2018-06-14 ASSESSMENT — ACTIVITIES OF DAILY LIVING (ADL): PREVIOUS_RESPONSIBILITIES: MEAL PREP;HOUSEKEEPING;LAUNDRY;SHOPPING;FINANCES;DRIVING

## 2018-06-14 NOTE — CONSULTS
CARE TRANSITION SOCIAL WORK INITIAL ASSESSMENT:  Reason For Consult: discharge planning   Met with: Patient.    DATA  Principal Problem:    Hyponatremia  Active Problems:    Chronic depressive personality disorder    Tobacco use disorder    Hyperlipidemia    Inflammatory disease of breast    Ascites    Portal hypertension (H)    Alcohol dependence (H)    Cirrhosis of liver with ascites, unspecified hepatic cirrhosis type (H)    Abnormal LFTs    Elevated INR    Leukocytosis       Primary Care Clinic Name: [facility]  Primary Care MD Name: Dr. Khalil  Contact information and PCP information verified: Yes    ASSESSMENT  Cognitive Status: awake, alert and oriented.        Lives With: alone  Living Arrangements: apartment         Who is your support system?: Sibling(s)       Insurance Concerns: No Insurance issues identified    This writer met with pt introduced self and role. Discussed discharge planning and medicare guidelines in regards to home care and SNF benefits. Patient states she lives alone in an apartment.  She states she has relatives and friends nearby to help out if she needs it.  Patient states that prior to hospitalization, she was independent with all ADLs.  Patient still drives.  She does not receive any in-home services.  She does not use an assistive device for mobility at baseline.  Patient admits that she is more weak than usual.  Awaiting P/T eval and Cognitive Eval to assist with disposition.  Patient states she would not have any family or friends that would be able to stay with her at discharge.  She states she will need a walker.  Discussed options for discharge including home care and TCU.  Patient is in agreement with sending TCU referrals in case she is not able to return home.  Patient's choices are as follows:  [facility] Phone: ([phone]) Fax: ([phone]), [place] [facility] (Phone: [phone] Fax: [phone]), [facility] (Main: [phone]  Admissions: [phone] Fax: [phone]), [facility] (Phone: [phone] Admissions: [phone] Fax: [phone]).      PLAN    TCU vs Home Care    Discharge Planner   Discharge Plans in progress: TBD  Barriers to discharge plan: Unknown  Follow up plan: PCP    LUTHER Garzon  [facility] [phone]/ [facility] [phone]         Entered by: Susie Alvarado 06/14/2018 1:39 PM

## 2018-06-14 NOTE — PROGRESS NOTES
"SUBJECTIVE:   Feels much better since 8 L off paracentesis  Still some dyspnea on exertion.   Able to eat something  Was drinking a lot of water prior to admission     ROS:4 point ROS including Respiratory, CV, GI and , other than that noted in the HPI,  is negative     OBJECTIVE:   BP (!) 89/50  Pulse 106  Temp 98.4  F (36.9  C) (Oral)  Resp 16  Ht 1.6 m (5' 3\")  Wt 65.9 kg (145 lb 4.5 oz)  LMP 05/19/2007  SpO2 97%  BMI 25.74 kg/m2    GENERAL APPEARANCE:  Weak, alert, NAD      RESP:clear      CV: regular rate and rhythm,  No  murmur , edema: none       Abdomen: soft, nontender, no liver or spleen enlargement, no masses, BSs normal   Skin: no cyanosis, pallor, or jaundice    CMP  Recent Labs  Lab 06/14/18  0440 06/14/18  0012 06/13/18  1825 06/13/18  1556 06/13/18  1119 06/13/18  0527  06/12/18  1036 06/11/18  1424   * 118* 117*  --  115* 116*  < > 113* 112*   POTASSIUM 3.6  --   --   --   --  3.7  --  3.6 3.7   CHLORIDE 84*  --   --   --   --  82*  --  78* 78*   CO2 25  --   --   --   --  24  --  20 16*   ANIONGAP 8  --   --   --   --  10  --  15* 18*   *  --   --   --   --  91  --  104* 113*   BUN 13  --   --   --   --  16  --  15 13   CR 0.56  --   --   --   --  0.57  --  0.57 0.50*   GFRESTIMATED >90  --   --   --   --  >90  --  >90 >90   GFRESTBLACK >90  --   --   --   --  >90  --  >90 >90   LYN 7.8*  --   --   --   --  7.6*  --  8.6 8.5   PROTTOTAL 6.3*  --   --   --   --  5.8*  --  7.2 7.0   ALBUMIN 2.7*  --   --  2.1*  --  2.0*  --  2.4* 2.3*   BILITOTAL 1.3  --   --   --   --  1.3  --  1.6* 1.5*   ALKPHOS 323*  --   --   --   --  361*  --  415* 412*   *  --   --   --   --  150*  --  148* 165*   ALT 13  --   --   --   --  18  --  20 20   < > = values in this interval not displayed.  CBC  Recent Labs  Lab 06/14/18  0440 06/13/18  0527 06/12/18  1036 06/11/18  1424   WBC 11.1* 12.5* 16.5* 17.6*   RBC 2.15* 2.19* 2.52* 2.51*   HGB 7.1* 7.3* 8.3* 8.5*   HCT 20.6* 20.7* 24.1* 24.3* "   MCV 96 95 96 97   MCH 33.0 33.3* 32.9 33.9*   MCHC 34.5 35.3 34.4 35.0   RDW 14.7 14.4 14.6 14.6    298 372 380     INR  Recent Labs  Lab 06/13/18  0527 06/11/18  1424   INR 1.44* 1.45*     Arterial BloodGas  No lab results found in last 7 days.   Venous Blood Gas  No lab results found in last 7 days.    Medications     folic acid  1 mg Oral Daily     furosemide  20 mg Intravenous Q6H     lidocaine (buffered or not buffered)  5-10 mL Intradermal Once     sodium chloride (PF)  3 mL Intracatheter Q8H     spironolactone  25 mg Oral Daily     thiamine  250 mg Intravenous Daily       Intake/Output Summary (Last 24 hours) at 06/14/18 0941  Last data filed at 06/13/18 2148   Gross per 24 hour   Intake              400 ml   Output             8400 ml   Net            -8000 ml         ASSESSMENT: PLAN:   Assessment & Plan     Karen Tee is a 62 year old female with a past medical history significant for alcohol dependence, cirrhosis/alcohol hepatitis with ascites 2010, dyslipidemia, depression, and tobacco dependence who presents on 6/12/2018 with unsteady gait, shortness of breath, abdominal bloating and hyponatremia.        Hyponatremia, severe   Went to clinic on 6/11/18 presenting with unsteady gait and shortness of breath, labs collected and sodium returned 112 after patient left clinic. Was called and told to go to emergency department 6/12, presented with sodium 113. Was given about 800cc's NS in emergency department, recheck sodium up to 115. Suspect hyponatremia primarily due to cirrhosis, though this is unusually low for cirrhosis and cirrhosis diagnosis is not certain (see below).    Started furosemide and spironolactone 6/12. Patient did not have much urine output. Has massive ascites needing tap, so complex situation. Sodium peaked at 117 at midnight and drifting down to 115 at noon. Held further furosemide.    Discussed 6/13 with Hepatologist who agreed with holding on further diuresis since  patient to have large volume paracentesis today, continue fluid restriction, follow sodium. Suggested also that may have other etiology for hyponatremia in addition to liver disease as the degree of hyponatremia is unusual, especially in comparison to degree of liver pathology biochemically.    - Sodium checks every 6 hours. If correction > 6 mEq in 24 hours, would consider adding free water but not DDAVP since patient is likely already high in ADH. Would not expect patient's sodium to normalize, may have some low sodium chronically   6/14/2018 no improvement in Na with 1500 cc fluid restriction, will try 1200.  Will need to add some lasix/wilmar to prevent recurrence. Will try to discuss with GI goals for hyponatremia in this setting.       Suspect cirrhosis given alcohol history and marked ascites   Presented July 2010 with hyponatremia 124, marked ascites, hgb 5.1 and had been drinking heavily. CT 2010 showed hepatomegaly with fatty infiltration, EGD showed no varices, hepatitis A/B/C serologies negative and had Hep A and Hep B vaccine series following that. Seen by Dr. Adonay Glover, Hepatology, at that time. Patient required multiple large volume paracentesis July to November 2010. Patient stopped alcohol in July and after November, needed no further paracentesis. Patient did treatment at [facility] and was dry 5 years she says today. Started drinking again about 3 years ago.      Noted abdominal distention for the past 2-3 weeks. Protuberant, fluid-filled abdomen on exam but non-tender. LFT's show alcohol hepatitis pattern, decreased albumin 2.0 and Tprotein 5.8, INR 1.45 but normal platelets. Leucocytosis otherwise nothing to indicate SBP.    Discussed with hepatology  6/13, -->paracentesis of 8 liters : RUQ US with doppler no portal vein thrombosis.  .   6/14/2018 no evidence of portal clots, but severe portal hypertension, fatty liver all point to cirrhosis.  Will need lasix and wilmar to prevent recurrence of  "ascites.  No SBP.  Bili and plts normal but INR elevated..    -     Leukocytosis  Presented with WBC 16.5, afebrile. Chest x-ray negative for infiltrate, but does show evidence of fluid overload. No abdominal pain or tenderness, so low suspicion for spontaneous bacterial peritonitis at this time. Tap pending. WBC 14  - No antibiotics       Anemia   Likely due to chronic alcohol use. Hgb 7.3. No evidence of bleeding.    - following, check studies     Elevated uric acid  Presented with uric acid 8.2. No current evidence for gout.      Alcohol dependence  Daily alcohol use, at least 6-7 oz ejs per day. No known history of seizures. Risk for withdrawal during current hospitalization. 1.75 liter lasts \"nearly a week\". Last drink 6 days ago. No evidence of withdrawal.   - CIWA protocol  - Prn Ativan use for withdrawal symptoms   - thiamine high dose   - folic acid     Tobacco use disorder  Smokes about 1/2 pack per day. Encourage cessation.  - Nicotine gum available     Chronic depressive personality disorder  Previously diagnosed. No longer taking any medications for depression. Defer to PCP for outpatient management.     Hyperlipidemia  Noted per problem list. Not currently taking any statin or other lipid lowering medication. Defer to PCP for outpatient management.        Fluids: Oral, fluid restriction  Electrolytes: Monitor  Nutrition: Regular diet     DVT Prophylaxis: Pneumatic Compression Devices  Code Status: Full Code - discussed directly with patient     Lines: Peripheral  Palm catheter: Not indicated    Discussion: Na barely moving but seems to have minimal symptoms. Will try fluid restrict and lasix, to see if we can get it up and keep it up with things she can do at home.    "

## 2018-06-14 NOTE — PROGRESS NOTES
"Patient up to bathroom with asst. Of 1. She is weak, mild tremor. A walker was obtained and used. She voided 300 ml of dark christy urine. She denies pain. Says she feels \"much better'. [facility] is here to do RUQ check.   "

## 2018-06-14 NOTE — PLAN OF CARE
Problem: Patient Care Overview  Goal: Plan of Care/Patient Progress Review  Discharge Planner OT   Patient plan for discharge: Home    Current status: SLUMS=19/30, indicating significant cognitive impairment. Low sodium may be contributing to confusion/cognitive impairment. Will plan to monitor and re-assess as appropriate.     Barriers to return to prior living situation: cognitive impairment    Recommendations for discharge: At this time I would recommend home with home OT to further assess cognition and safety with IADL tasks.  Assist with medication management (if she leaves hospital with new meds as pt states she has no medications she takes at baseline).     Rationale for recommendations: Confusion.        Entered by: Zoe Huggins 06/14/2018 3:37 PM

## 2018-06-14 NOTE — PLAN OF CARE
Problem: Patient Care Overview  Goal: Plan of Care/Patient Progress Review  Outcome: Improving  Patient remained stable throughout afternoon shift. VSS, no s/sx or complaints of distress, pain. Ambulated to bathroom once and to chair once with walker and SBA. Shaky, but tolerated activity well. Stated she felt motivated to get stronger. CIWA score at 1600 zero, no evidence of alcohol withdrawal currently. Using call light appropriately, however, still using bed alarm and chair alarm. Currently sitting up in chair eating dinner. Will cont to monitor.

## 2018-06-14 NOTE — PROGRESS NOTES
06/14/18 1400   Quick Adds   Type of Visit Initial PT Evaluation   Living Environment   Lives With alone   Living Arrangements apartment   Home Accessibility no concerns   Functional Level Prior   Ambulation 0-->independent   Transferring 0-->independent   Toileting 0-->independent   Bathing 0-->independent   Dressing 0-->independent   Eating 0-->independent   Communication 0-->understands/communicates without difficulty   Swallowing 0-->swallows foods/liquids without difficulty   Cognition 0 - no cognition issues reported   Fall history within last six months no   Which of the above functional risks had a recent onset or change? ambulation   Prior Functional Level Comment PLOF- Pt indep. with ambulation with no device, requesting a walker   General Information   Onset of Illness/Injury or Date of Surgery - Date 06/13/18   Referring Physician Drea   Patient/Family Goals Statement Pt w/ goal of Dc to home   Pertinent History of Current Problem (include personal factors and/or comorbidities that impact the POC) 62 year old female with a past medical history significant for alcohol dependence, cirrhosis/alcohol hepatitis with ascites 2010, dyslipidemia, depression, and tobacco dependence who presents on 6/12/2018 with unsteady gait, shortness of breath, abdominal bloating and hyponatremia; admitted to the hospital w/ hyponatremia ., s/p  Paracentesis    Precautions/Limitations fall precautions   General Observations Pt alert, denies pain; reports weakness. Mild confusion   Cognitive Status Examination   Orientation orientation to person, place and time   Level of Consciousness alert   Follows Commands and Answers Questions able to follow multistep instructions   Personal Safety and Judgment impaired  (attempt mobility  indep. despite just discussing to have assistance  )   Pain Assessment   Patient Currently in Pain Yes, see Vital Sign flowsheet   Posture    Posture Comments Ascites, frail   Range of Motion (ROM)    ROM Comment DF AROM to neutral    MMT: Hip, Rehab Eval   Hip Flexion - Left Side (4-/5) good minus, left   Hip Flexion - Right Side (4-/5) good minus, right   MMT: Knee, Rehab Eval   Knee Flexion - Left Side (4/5) good, left   Knee Extension - Left Side (4/5) good, left   Knee Flexion - Right Side (4/5) good, right   Knee Extension - Right Side (4/5) good, right   MMT: Ankle, Rehab Eval   Ankle Dorsiflexion - Left Side (4-/5) good minus, left   Ankle Dorsiflexion - Right Side (4-/5) good minus, left   Bed Mobility   Bed Mobility Comments Pt attempted, but unable to perfrom supine> sitting indep- required Min. asistance   ; SBA to indep w/ sitting> supine   Transfer Skills   Transfer Comments SBA sit> stand w/ walker, cues for hand placement   Gait   Gait Comments Pt amb. 40 feet x1 withRW, SBA. pt ambulates w. short shufling steps/  clearance of LEs, but no LOB,  ;needs walker for gait stability.    Balance   Balance Comments Pt able to stand w/o walker support andSBA, wtshifts and reaches w/o LOB, uses slight incrase in KATHY, states she would not retrieve an object from the floor    Sensory Examination   Sensory Perception no deficits were identified   General Therapy Interventions   Planned Therapy Interventions bed mobility training;gait training;strengthening;balance training   Clinical Impression   Criteria for Skilled Therapeutic Intervention yes, treatment indicated   PT Diagnosis Generalized weakness, impaired gait   Influenced by the following impairments Decreased strength, balance   Functional limitations due to impairments altered mobility - decreased amb. status w/ need for more supportiveAD   Clinical Presentation Stable/Uncomplicated   Clinical Presentation Rationale clinical judgement   Clinical Decision Making (Complexity) Low complexity   Therapy Frequency` daily   Predicted Duration of Therapy Intervention (days/wks) 2 days   Anticipated Equipment Needs at Discharge front wheeled walker  "  Anticipated Discharge Disposition Home with Home Therapy   Risk & Benefits of therapy have been explained Yes   Patient, Family & other staff in agreement with plan of care Yes   [facility] AM-PAC  \"6 Clicks\" V.2 Basic Mobility Inpatient Short Form   1. Turning from your back to your side while in a flat bed without using bedrails? 4 - None   2. Moving from lying on your back to sitting on the side of a flat bed without using bedrails? 3 - A Little   3. Moving to and from a bed to a chair (including a wheelchair)? 3 - A Little   4. Standing up from a chair using your arms (e.g., wheelchair, or bedside chair)? 3 - A Little   5. To walk in hospital room? 3 - A Little   6. Climbing 3-5 steps with a railing? 2 - A Lot   Basic Mobility Raw Score (Score out of 24.Lower scores equate to lower levels of function) 18     "

## 2018-06-14 NOTE — PROGRESS NOTES
"   06/14/18 1500   Quick Adds   Type of Visit Initial Occupational Therapy Evaluation   Living Environment   Lives With alone   Living Arrangements apartment   Home Accessibility tub/shower is not walk in   Functional Level Prior   Ambulation 0-->independent   Transferring 0-->independent   Toileting 0-->independent   Bathing 0-->independent   Dressing 0-->independent   Eating 0-->independent   Communication 0-->understands/communicates without difficulty   Swallowing 0-->swallows foods/liquids without difficulty   Cognition 0 - no cognition issues reported   Fall history within last six months no   Which of the above functional risks had a recent onset or change? cognition   General Information   Onset of Illness/Injury or Date of Surgery - Date 06/12/18   Referring Physician Garth Shepard MD   Patient/Family Goals Statement To return home   Additional Occupational Profile Info/Pertinent History of Current Problem 62 year old female with a past medical history significant for alcohol dependence, cirrhosis/alcohol hepatitis with ascites 2010, dyslipidemia, depression, and tobacco dependence who presents on 6/12/2018 with unsteady gait, shortness of breath, abdominal bloating and hyponatremia; admitted to the hospital w/ hyponatremia ., s/p  Paracentesis    Cognitive Status Examination   Orientation person;place  (\"2019\" for year)   Level of Consciousness alert   Able to Follow Commands mild impairment   Personal Safety (Cognitive) mild impairment;decreased insight to deficits;impulsive   Memory impaired   Attention Distractible during evaluation;Quiet environment required;Sustained attention impaired;Selective attention impaired, difficulty ignoring irrelevant stimuli;Alternating attention impaired, difficulty shifting between tasks   Executive Function Impulsive;Working memory impaired, decreased storage of information for performing tasks;Cognitive flexibility impaired;Self awareness/monitoring impaired " "  Cognitive Comment Completes SLUMS scores 19/30 indicating significant cognitive impairment. Points lost on orientation, calculation, registration, delayed recall.    Visual Perception   Visual Perception No deficits were identified   Pain Assessment   Patient Currently in Pain Yes, see Vital Sign flowsheet  (back pain while seated EOB)   Range of Motion (ROM)   ROM Comment B UE ROM: WNL   Strength   Strength Comments generalized weakness noted.    Hand Strength   Hand Strength Comments B  strength: WNL   Transfer Skills   Transfer Comments supine <> sit supervision only with HOB flat. Pt declines ADL assessment- will complete tomorrow.    Instrumental Activities of Daily Living (IADL)   Previous Responsibilities meal prep;housekeeping;laundry;shopping;finances;driving   IADL Comments Pt states she does not take any medications at baseline.    Activities of Daily Living Analysis   Impairments Contributing to Impaired Activities of Daily Living cognition impaired;strength decreased   General Therapy Interventions   Planned Therapy Interventions cognition;progressive activity/exercise   Clinical Impression   Criteria for Skilled Therapeutic Interventions Met yes, treatment indicated   OT Diagnosis decreased independence/safety with ADLs/IADLs   Influenced by the following impairments confusion- low sodium- continue to monitor.    Assessment of Occupational Performance 1-3 Performance Deficits   Identified Performance Deficits safety with medication management, cooking, driving.    Clinical Decision Making (Complexity) Low complexity   Therapy Frequency daily   Predicted Duration of Therapy Intervention (days/wks) 2-3 days   Anticipated Discharge Disposition Home with Home Therapy   Risks and Benefits of Treatment have been explained. Yes   Patient, Family & other staff in agreement with plan of care Yes   [facility] AM-PAC  \"6 Clicks\" Daily Activity Inpatient Short Form   1. Putting on and taking off " regular lower body clothing? 3 - A Little   2. Bathing (including washing, rinsing, drying)? 3 - A Little   3. Toileting, which includes using toilet, bedpan or urinal? 3 - A Little   4. Putting on and taking off regular upper body clothing? 4 - None   5. Taking care of personal grooming such as brushing teeth? 4 - None   6. Eating meals? 4 - None   Daily Activity Raw Score (Score out of 24.Lower scores equate to lower levels of function) 21   Total Evaluation Time   Total Evaluation Time (Minutes) 10

## 2018-06-14 NOTE — PLAN OF CARE
Problem: Patient Care Overview  Goal: Plan of Care/Patient Progress Review  Discharge Planner PT   Patient plan for discharge: Home  Current status: Evaluation completed.d Pt required assistance w/ supine> sitting;   amb. 40 feet x1 with RW, SBA.   Pt ambulates w/. short shufling steps/  clearance of LEs, but no LOB,  ;needs walker for gait stability  Barriers to return to prior living situation: medical stability , limited mobility   Recommendations for discharge: home w/ HC if continues to progress. Walker use   Rationale for recommendations: Above status       Entered by: Susi Montes 2018 3:11 PM

## 2018-06-14 NOTE — PROGRESS NOTES
[facility] Medicine Follow up Note  Date of Service: 06/13/2018      8 liters removed with paracentesis. 82 cells. Does not look infected.     Per UTD, 6-8 grams albumin per liter of ascites.  - Received 25 grams per [facility] protocol but will give 25 mg more.     VS unchanged. Patient feels much better.     Echo normal. RUQ US pending.     Sodium 117.    - No other changes.   Moy Mckeon MD  [facility] Medicine

## 2018-06-14 NOTE — PROGRESS NOTES
Sodium this morning is 117 per the lab. There is an IT problem and they are not able to result in the chart.

## 2018-06-15 ENCOUNTER — APPOINTMENT (OUTPATIENT)
Dept: OCCUPATIONAL THERAPY | Facility: CLINIC | Age: 63
DRG: 432 | End: 2018-06-15
Payer: MEDICARE

## 2018-06-15 ENCOUNTER — APPOINTMENT (OUTPATIENT)
Dept: PHYSICAL THERAPY | Facility: CLINIC | Age: 63
DRG: 432 | End: 2018-06-15
Payer: MEDICARE

## 2018-06-15 ENCOUNTER — APPOINTMENT (OUTPATIENT)
Dept: GENERAL RADIOLOGY | Facility: CLINIC | Age: 63
DRG: 432 | End: 2018-06-15
Attending: FAMILY MEDICINE
Payer: MEDICARE

## 2018-06-15 LAB
ALBUMIN SERPL-MCNC: 2.4 G/DL (ref 3.4–5)
ALP SERPL-CCNC: 317 U/L (ref 40–150)
ALT SERPL W P-5'-P-CCNC: 15 U/L (ref 0–50)
AMMONIA PLAS-SCNC: 34 UMOL/L (ref 10–50)
ANION GAP SERPL CALCULATED.3IONS-SCNC: 8 MMOL/L (ref 3–14)
AST SERPL W P-5'-P-CCNC: 141 U/L (ref 0–45)
BILIRUB SERPL-MCNC: 1.1 MG/DL (ref 0.2–1.3)
BUN SERPL-MCNC: 14 MG/DL (ref 7–30)
CALCIUM SERPL-MCNC: 7.8 MG/DL (ref 8.5–10.1)
CHLORIDE SERPL-SCNC: 85 MMOL/L (ref 94–109)
CO2 SERPL-SCNC: 26 MMOL/L (ref 20–32)
CREAT SERPL-MCNC: 0.6 MG/DL (ref 0.52–1.04)
ERYTHROCYTE [DISTWIDTH] IN BLOOD BY AUTOMATED COUNT: 14.9 % (ref 10–15)
GFR SERPL CREATININE-BSD FRML MDRD: >90 ML/MIN/1.7M2
GLUCOSE SERPL-MCNC: 98 MG/DL (ref 70–99)
HCT VFR BLD AUTO: 21 % (ref 35–47)
HGB BLD-MCNC: 7.4 G/DL (ref 11.7–15.7)
LACTATE BLD-SCNC: 1.4 MMOL/L (ref 0.4–1.9)
MCH RBC QN AUTO: 33.8 PG (ref 26.5–33)
MCHC RBC AUTO-ENTMCNC: 35.2 G/DL (ref 31.5–36.5)
MCV RBC AUTO: 96 FL (ref 78–100)
PLATELET # BLD AUTO: 255 10E9/L (ref 150–450)
POTASSIUM SERPL-SCNC: 3.8 MMOL/L (ref 3.4–5.3)
PROT SERPL-MCNC: 6 G/DL (ref 6.8–8.8)
RBC # BLD AUTO: 2.19 10E12/L (ref 3.8–5.2)
SODIUM SERPL-SCNC: 119 MMOL/L (ref 133–144)
SODIUM SERPL-SCNC: 122 MMOL/L (ref 133–144)
SODIUM SERPL-SCNC: 124 MMOL/L (ref 133–144)
WBC # BLD AUTO: 12.7 10E9/L (ref 4–11)

## 2018-06-15 PROCEDURE — 97116 GAIT TRAINING THERAPY: CPT | Mod: GP | Performed by: PHYSICAL THERAPIST

## 2018-06-15 PROCEDURE — 83605 ASSAY OF LACTIC ACID: CPT | Performed by: FAMILY MEDICINE

## 2018-06-15 PROCEDURE — A9270 NON-COVERED ITEM OR SERVICE: HCPCS | Mod: GY | Performed by: INTERNAL MEDICINE

## 2018-06-15 PROCEDURE — 82140 ASSAY OF AMMONIA: CPT | Performed by: FAMILY MEDICINE

## 2018-06-15 PROCEDURE — 97110 THERAPEUTIC EXERCISES: CPT | Mod: GP | Performed by: PHYSICAL THERAPIST

## 2018-06-15 PROCEDURE — 80053 COMPREHEN METABOLIC PANEL: CPT | Performed by: FAMILY MEDICINE

## 2018-06-15 PROCEDURE — 97110 THERAPEUTIC EXERCISES: CPT | Mod: GO

## 2018-06-15 PROCEDURE — 85027 COMPLETE CBC AUTOMATED: CPT | Performed by: FAMILY MEDICINE

## 2018-06-15 PROCEDURE — 12000011 ZZH R&B MS OVERFLOW

## 2018-06-15 PROCEDURE — 40000193 ZZH STATISTIC PT WARD VISIT: Performed by: PHYSICAL THERAPIST

## 2018-06-15 PROCEDURE — 25000132 ZZH RX MED GY IP 250 OP 250 PS 637: Mod: GY | Performed by: INTERNAL MEDICINE

## 2018-06-15 PROCEDURE — 99233 SBSQ HOSP IP/OBS HIGH 50: CPT | Performed by: FAMILY MEDICINE

## 2018-06-15 PROCEDURE — 25000128 H RX IP 250 OP 636: Performed by: FAMILY MEDICINE

## 2018-06-15 PROCEDURE — 25000132 ZZH RX MED GY IP 250 OP 250 PS 637: Mod: GY | Performed by: FAMILY MEDICINE

## 2018-06-15 PROCEDURE — 97535 SELF CARE MNGMENT TRAINING: CPT | Mod: GO

## 2018-06-15 PROCEDURE — 36415 COLL VENOUS BLD VENIPUNCTURE: CPT | Performed by: FAMILY MEDICINE

## 2018-06-15 PROCEDURE — A9270 NON-COVERED ITEM OR SERVICE: HCPCS | Mod: GY | Performed by: FAMILY MEDICINE

## 2018-06-15 PROCEDURE — 84295 ASSAY OF SERUM SODIUM: CPT | Performed by: FAMILY MEDICINE

## 2018-06-15 PROCEDURE — 71046 X-RAY EXAM CHEST 2 VIEWS: CPT

## 2018-06-15 PROCEDURE — 25000128 H RX IP 250 OP 636: Performed by: INTERNAL MEDICINE

## 2018-06-15 PROCEDURE — 40000133 ZZH STATISTIC OT WARD VISIT

## 2018-06-15 RX ORDER — FUROSEMIDE 40 MG
40 TABLET ORAL DAILY
Status: DISCONTINUED | OUTPATIENT
Start: 2018-06-15 | End: 2018-06-18 | Stop reason: HOSPADM

## 2018-06-15 RX ADMIN — SPIRONOLACTONE 25 MG: 25 TABLET ORAL at 08:29

## 2018-06-15 RX ADMIN — SODIUM CHLORIDE 250 ML: 9 INJECTION, SOLUTION INTRAVENOUS at 18:05

## 2018-06-15 RX ADMIN — THIAMINE HYDROCHLORIDE 250 MG: 100 INJECTION, SOLUTION INTRAMUSCULAR; INTRAVENOUS at 08:29

## 2018-06-15 RX ADMIN — FOLIC ACID 1 MG: 1 TABLET ORAL at 08:29

## 2018-06-15 RX ADMIN — FUROSEMIDE 40 MG: 40 TABLET ORAL at 08:29

## 2018-06-15 NOTE — PROGRESS NOTES
Patients BP slightly lower than her baseline, but she is on a fluid restriction of 1200 cc and got 3 doses of IV Lasix yesterday. Patient was up denies Dizziness. HR 90's. This morning she is due for cxr. WBC is up slightly to 12. She fired Sepsis alert. Will defer to AM physician.

## 2018-06-15 NOTE — PLAN OF CARE
Problem: Patient Care Overview  Goal: Plan of Care/Patient Progress Review  Discharge Planner PT   Patient plan for discharge: Pt would like to DC to home; agreeable to TCU stay  Current status: Pt alert, fatigued- c/o weakness. AXOX3,   SBA supine> sitting w/ use of railing, amb in room to BR, then an additional  30 feet x1 with R W, SBA, continues w/ slow rate, short shuffling steps; returned to supine w/ SBA using the bed rail  Barriers to return to prior living situation: fall risk;decreased strengthl ;unsteady gait  Recommendations for discharge: TCU  Rationale for recommendations: see above       Entered by: Susi Montes 06/15/2018 3:56 PM

## 2018-06-15 NOTE — PROGRESS NOTES
"Patient is alert and oriented, she is a bit \"spacy\", but appropriate. No CIWA scoring. She denies pain, nausea, requests a diet cola. We are maintaining a fluid restriction of 1200 ml, but patient does not request to drink much and is below this restriction. Getting Lasix 20mg every 6 hours, BP is marginal, but tends to run low at baseline.   "

## 2018-06-15 NOTE — PLAN OF CARE
"Problem: Patient Care Overview  Goal: Plan of Care/Patient Progress Review  Discharge Planner OT   Patient plan for discharge: TCU     Current status: With verbal cues pt able to complete supine to sit with SBA with HOB flat- does attempt to pull walker to assist with this transfer, educated pt on safey hazards with this. Completes sit to stand, ambulates to/from bathroom, completes toilet transfer all with CGA and FWW. Slow pace. Lower body dressing with SBA. Cognition appears unchanged from yesterday. States year as \"1918\"- unable to correct with 1 cue. Distractible at times during therapy session and unsafe walker use needing cues/education.    Barriers to return to prior living situation: cognition, generalized weakness, new to walker- unsure if pt would actually use walker at baseline d/t cognition (memory, attention etc.)    Recommendations for discharge: TCU     Rationale for recommendations: To increase independence with ADLs and functional mobility. Continue to monitor/assess cognition for safety with IADLs.        Entered by: Zoe Hgugins 06/15/2018 1:54 PM           "

## 2018-06-15 NOTE — PROGRESS NOTES
"Pt alert, cooperative, unable to state date until looking at board in room, states its 1988. CIWA 3.   Ambulated to bathroom with walker and standby, assisted pt to perform ACTIVITIES OF DAILY LIVING's. Ambulated back to chair and pt very weak and unsteady, needing assistance so as not to fall.  currently pt lives in apt. And pt states she feels she can't go home, I'm to weak to go back there\"  "

## 2018-06-15 NOTE — PROGRESS NOTES
Reason for Follow up: DC Planning    Anticipated discharge needs: Pt has been declined admission at  [facility] (Main: [phone] Admissions: [phone] Fax: [phone])  And [facility] (Phone: [phone] Fax: [phone]).     Referrals are still pending at   [facility] (Phone: [phone] Admissions: [phone] Fax: [phone])  [facility] Phone: (Admissions: [phone] RN Report: [phone] Fax: [phone])   [facility] (Phone: [phone] Fax: [phone])  [facility] Phone: ([phone]) Fax: ([phone])      [facility] also sending global referrals to other TCUs. Pt will need to have a therapy need in order to qualify for TCU.     Next steps: CTS to follow    Trinity Rodgers MSANJANA, [facility], [facility] [phone]  Discharge Planner   Discharge Plans in progress: TCU  Barriers to discharge plan: medical stability  Follow up plan: CTS to follow       Entered by: Trinity Rodgers 06/15/2018 11:03 AM

## 2018-06-15 NOTE — PROGRESS NOTES
Blood pressure remains low at 77/60 (64), orientation has improved, pt is now aware of month, date and year, remains forgetful. Remains weak.   Dr. Shepard updated on blood pressure : 1732

## 2018-06-15 NOTE — PROGRESS NOTES
"SUBJECTIVE:   Feels better.  Less abd distension  Still eating poorly.   Walked with walker.       ROS:4 point ROS including Respiratory, CV, GI and , other than that noted in the HPI,  is negative     OBJECTIVE:   BP 94/52 (BP Location: Left arm)  Pulse 95  Temp 97.7  F (36.5  C) (Oral)  Resp 16  Ht 1.6 m (5' 3\")  Wt 66 kg (145 lb 8.1 oz)  LMP 05/19/2007  SpO2 99%  BMI 25.77 kg/m2    GENERAL APPEARANCE:  Awake, alert, conversant, seems to understand the gravity of her situation      RESP:clear      CV: regular rate and rhythm,  No  murmur , edema: none       Abdomen: soft, nontender, no liver or spleen enlargement, no masses, BSs normal   Skin: no cyanosis, pallor, or jaundice    CMP  Recent Labs  Lab 06/15/18  0515 06/14/18  2155 06/14/18  1429 06/14/18  1215  06/14/18  0440  06/13/18  1556  06/13/18  0527  06/12/18  1036   * 121* 120* 119*  < > 117*  < >  --   < > 116*  < > 113*   POTASSIUM 3.8  --   --  3.8  --  3.6  --   --   --  3.7  --  3.6   CHLORIDE 85*  --   --  84*  --  84*  --   --   --  82*  --  78*   CO2 26  --   --  23  --  25  --   --   --  24  --  20   ANIONGAP 8  --   --  12  --  8  --   --   --  10  --  15*   GLC 98  --   --  105*  --  102*  --   --   --  91  --  104*   BUN 14  --   --  13  --  13  --   --   --  16  --  15   CR 0.60  --   --  0.60  --  0.56  --   --   --  0.57  --  0.57   GFRESTIMATED >90  --   --  >90  --  >90  --   --   --  >90  --  >90   GFRESTBLACK >90  --   --  >90  --  >90  --   --   --  >90  --  >90   LYN 7.8*  --   --  8.3*  --  7.8*  --   --   --  7.6*  --  8.6   PROTTOTAL 6.0*  --   --   --   --  6.3*  --   --   --  5.8*  --  7.2   ALBUMIN 2.4*  --   --   --   --  2.7*  --  2.1*  --  2.0*  --  2.4*   BILITOTAL 1.1  --   --   --   --  1.3  --   --   --  1.3  --  1.6*   ALKPHOS 317*  --   --   --   --  323*  --   --   --  361*  --  415*   *  --   --   --   --  152*  --   --   --  150*  --  148*   ALT 15  --   --   --   --  13  --   --   --  18  --  " 20   < > = values in this interval not displayed.  CBC  Recent Labs  Lab 06/15/18  0515 06/14/18  0440 06/13/18  0527 06/12/18  1036   WBC 12.7* 11.1* 12.5* 16.5*   RBC 2.19* 2.15* 2.19* 2.52*   HGB 7.4* 7.1* 7.3* 8.3*   HCT 21.0* 20.6* 20.7* 24.1*   MCV 96 96 95 96   MCH 33.8* 33.0 33.3* 32.9   MCHC 35.2 34.5 35.3 34.4   RDW 14.9 14.7 14.4 14.6    281 298 372     INR  Recent Labs  Lab 06/13/18  0527 06/11/18  1424   INR 1.44* 1.45*     Arterial BloodGas  No lab results found in last 7 days.   Venous Blood Gas  No lab results found in last 7 days.    Medications     folic acid  1 mg Oral Daily     furosemide  40 mg Oral Daily     lidocaine (buffered or not buffered)  5-10 mL Intradermal Once     sodium chloride (PF)  3 mL Intracatheter Q8H     spironolactone  25 mg Oral Daily     thiamine  250 mg Intravenous Daily       Intake/Output Summary (Last 24 hours) at 06/15/18 0755  Last data filed at 06/15/18 0600   Gross per 24 hour   Intake             1070 ml   Output              800 ml   Net              270 ml         ASSESSMENT: PLAN:   ASSESSMENT: PLAN:   Assessment & Plan     Karen Tee is a 62 year old female with a past medical history significant for alcohol dependence, cirrhosis/alcohol hepatitis with ascites 2010, dyslipidemia, depression, and tobacco dependence who presents on 6/12/2018 with unsteady gait, shortness of breath, abdominal bloating and hyponatremia.          Hyponatremia, severe   Went to clinic on 6/11/18 presenting with unsteady gait and shortness of breath, labs collected and sodium returned 112 after patient left clinic. Was called and told to go to emergency department 6/12, presented with sodium 113. Was given about 800cc's NS in emergency department, recheck sodium up to 115. Suspect hyponatremia primarily due to cirrhosis, though this is unusually low for cirrhosis and cirrhosis diagnosis is not certain (see below).    Started furosemide and spironolactone 6/12. Patient did  not have much urine output. Has massive ascites needing tap, so complex situation. Sodium peaked at 117 at midnight and drifting down to 115 at noon. Held further furosemide.    Discussed 6/13 with Hepatologist who agreed with holding on further diuresis since patient to have large volume paracentesis today, continue fluid restriction, follow sodium. Suggested also that may have other etiology for hyponatremia in addition to liver disease as the degree of hyponatremia is unusual, especially in comparison to degree of liver pathology biochemically.    - Sodium checks every 6 hours. If correction > 6 mEq in 24 hours, would consider adding free water but not DDAVP since patient is likely already high in ADH. Would not expect patient's sodium to normalize, may have some low sodium chronically   6/14/2018 no improvement in Na with 1500 cc fluid restriction, will try 1200.  Will need to add some lasix/wilmar to prevent recurrence. Will try to discuss with GI goals for hyponatremia in this setting.    6/15/2018 finally moved to 121 and now 119.  Unclear how high we can get this.        Suspect cirrhosis given alcohol history and marked ascites   Presented July 2010 with hyponatremia 124, marked ascites, hgb 5.1 and had been drinking heavily. CT 2010 showed hepatomegaly with fatty infiltration, EGD showed no varices, hepatitis A/B/C serologies negative and had Hep A and Hep B vaccine series following that. Seen by Dr. Adonay Glover, Hepatology, at that time. Patient required multiple large volume paracentesis July to November 2010. Patient stopped alcohol in July and after November, needed no further paracentesis. Patient did treatment at [facility] and was dry 5 years she says today. Started drinking again about 3 years ago.      Noted abdominal distention for the past 2-3 weeks. Protuberant, fluid-filled abdomen on exam but non-tender. LFT's show alcohol hepatitis pattern, decreased albumin 2.0 and Tprotein 5.8, INR 1.45 but  "normal platelets. Leucocytosis otherwise nothing to indicate SBP.    Discussed with hepatology  6/13, -->paracentesis of 8 liters : RUQ US with doppler no portal vein thrombosis.  .   6/14/2018 no evidence of portal clots, but severe portal hypertension, fatty liver all point to cirrhosis.  Will need lasix and wilmar to prevent recurrence of ascites.  No SBP.  Bili and plts normal but INR elevated..    6/15/2018 will try change to oral lasix 40 daily .  BP low now and will see if it recovers with lower dose.   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  SLUMs 19.  Is really unsafe for independent living.  Will need TCU at least for a while.      Hypotension without hypoperfusion:   -BPs low on 6/15/2018.  But no evidence of hypoperfusion.  Will try not to go up on fluids as she just puts it in her abdomen.        Pulmonary vascular congestion:  -CXR suggests some vascular congestion.  Better with lasix.  Unclear if really CHF or just diffuse volume overload from cirrhosis.      Leukocytosis  Presented with WBC 16.5, afebrile. Chest x-ray negative for infiltrate, but does show evidence of fluid overload. No abdominal pain or tenderness, so low suspicion for spontaneous bacterial peritonitis at this time. Tap pending. WBC 14  - No antibiotics        Anemia   Likely due to chronic alcohol use. Hgb 7.3. No evidence of bleeding.    - following, check studies      Elevated uric acid  Presented with uric acid 8.2. No current evidence for gout.       Alcohol dependence  Daily alcohol use, at least 6-7 oz jes per day. No known history of seizures. Risk for withdrawal during current hospitalization. 1.75 liter lasts \"nearly a week\". Last drink 6 days ago. No evidence of withdrawal.   - [facility] protocol  - Prn Ativan use for withdrawal symptoms   - thiamine high dose   - folic acid      Tobacco use disorder  Smokes about 1/2 pack per day. Encourage cessation.  - Nicotine " gum available      Chronic depressive personality disorder  Previously diagnosed. No longer taking any medications for depression. Defer to PCP for outpatient management.      Hyperlipidemia  Noted per problem list. Not currently taking any statin or other lipid lowering medication. Defer to PCP for outpatient management.          Fluids: Oral, fluid restriction  Electrolytes: Monitor  Nutrition: Regular diet      DVT Prophylaxis: Pneumatic Compression Devices  Code Status: Full Code - discussed directly with patient      Lines: Peripheral  Palm catheter: Not indicated     Discussion: Na finially moved with lasix and fluid restriction.  But now BP low.  Backing off on lasix some.   Will need TCU for cognitive impairment.

## 2018-06-16 ENCOUNTER — APPOINTMENT (OUTPATIENT)
Dept: PHYSICAL THERAPY | Facility: CLINIC | Age: 63
DRG: 432 | End: 2018-06-16
Payer: MEDICARE

## 2018-06-16 ENCOUNTER — APPOINTMENT (OUTPATIENT)
Dept: OCCUPATIONAL THERAPY | Facility: CLINIC | Age: 63
DRG: 432 | End: 2018-06-16
Payer: MEDICARE

## 2018-06-16 ENCOUNTER — APPOINTMENT (OUTPATIENT)
Dept: ULTRASOUND IMAGING | Facility: CLINIC | Age: 63
DRG: 432 | End: 2018-06-16
Attending: FAMILY MEDICINE
Payer: MEDICARE

## 2018-06-16 LAB
ANION GAP SERPL CALCULATED.3IONS-SCNC: 10 MMOL/L (ref 3–14)
BUN SERPL-MCNC: 15 MG/DL (ref 7–30)
CALCIUM SERPL-MCNC: 7.7 MG/DL (ref 8.5–10.1)
CHLORIDE SERPL-SCNC: 88 MMOL/L (ref 94–109)
CO2 SERPL-SCNC: 26 MMOL/L (ref 20–32)
CREAT SERPL-MCNC: 0.58 MG/DL (ref 0.52–1.04)
ERYTHROCYTE [DISTWIDTH] IN BLOOD BY AUTOMATED COUNT: 15 % (ref 10–15)
GFR SERPL CREATININE-BSD FRML MDRD: >90 ML/MIN/1.7M2
GLUCOSE SERPL-MCNC: 97 MG/DL (ref 70–99)
HCT VFR BLD AUTO: 20.6 % (ref 35–47)
HGB BLD-MCNC: 7.2 G/DL (ref 11.7–15.7)
LACTATE BLD-SCNC: 2.2 MMOL/L (ref 0.7–2)
MCH RBC QN AUTO: 33.3 PG (ref 26.5–33)
MCHC RBC AUTO-ENTMCNC: 35 G/DL (ref 31.5–36.5)
MCV RBC AUTO: 95 FL (ref 78–100)
PLATELET # BLD AUTO: 251 10E9/L (ref 150–450)
POTASSIUM SERPL-SCNC: 3.6 MMOL/L (ref 3.4–5.3)
RBC # BLD AUTO: 2.16 10E12/L (ref 3.8–5.2)
SODIUM SERPL-SCNC: 124 MMOL/L (ref 133–144)
SODIUM SERPL-SCNC: 124 MMOL/L (ref 133–144)
SODIUM SERPL-SCNC: 126 MMOL/L (ref 133–144)
WBC # BLD AUTO: 14.1 10E9/L (ref 4–11)

## 2018-06-16 PROCEDURE — 25000128 H RX IP 250 OP 636: Performed by: FAMILY MEDICINE

## 2018-06-16 PROCEDURE — 25000132 ZZH RX MED GY IP 250 OP 250 PS 637: Mod: GY | Performed by: INTERNAL MEDICINE

## 2018-06-16 PROCEDURE — A9270 NON-COVERED ITEM OR SERVICE: HCPCS | Mod: GY | Performed by: INTERNAL MEDICINE

## 2018-06-16 PROCEDURE — A9270 NON-COVERED ITEM OR SERVICE: HCPCS | Mod: GY | Performed by: FAMILY MEDICINE

## 2018-06-16 PROCEDURE — 97116 GAIT TRAINING THERAPY: CPT | Mod: GP | Performed by: PHYSICAL THERAPIST

## 2018-06-16 PROCEDURE — 99233 SBSQ HOSP IP/OBS HIGH 50: CPT | Performed by: FAMILY MEDICINE

## 2018-06-16 PROCEDURE — 84295 ASSAY OF SERUM SODIUM: CPT | Performed by: FAMILY MEDICINE

## 2018-06-16 PROCEDURE — 12000011 ZZH R&B MS OVERFLOW

## 2018-06-16 PROCEDURE — 36415 COLL VENOUS BLD VENIPUNCTURE: CPT | Performed by: FAMILY MEDICINE

## 2018-06-16 PROCEDURE — 40000193 ZZH STATISTIC PT WARD VISIT: Performed by: PHYSICAL THERAPIST

## 2018-06-16 PROCEDURE — 83605 ASSAY OF LACTIC ACID: CPT | Performed by: FAMILY MEDICINE

## 2018-06-16 PROCEDURE — 80048 BASIC METABOLIC PNL TOTAL CA: CPT | Performed by: FAMILY MEDICINE

## 2018-06-16 PROCEDURE — 97110 THERAPEUTIC EXERCISES: CPT | Mod: GP | Performed by: PHYSICAL THERAPIST

## 2018-06-16 PROCEDURE — 25000128 H RX IP 250 OP 636: Performed by: INTERNAL MEDICINE

## 2018-06-16 PROCEDURE — 40000133 ZZH STATISTIC OT WARD VISIT

## 2018-06-16 PROCEDURE — 97530 THERAPEUTIC ACTIVITIES: CPT | Mod: GO

## 2018-06-16 PROCEDURE — 25000132 ZZH RX MED GY IP 250 OP 250 PS 637: Mod: GY | Performed by: FAMILY MEDICINE

## 2018-06-16 PROCEDURE — 85027 COMPLETE CBC AUTOMATED: CPT | Performed by: FAMILY MEDICINE

## 2018-06-16 PROCEDURE — 76705 ECHO EXAM OF ABDOMEN: CPT

## 2018-06-16 PROCEDURE — 87040 BLOOD CULTURE FOR BACTERIA: CPT | Performed by: FAMILY MEDICINE

## 2018-06-16 RX ORDER — POLYETHYLENE GLYCOL 3350 17 G/17G
17 POWDER, FOR SOLUTION ORAL DAILY
Status: DISCONTINUED | OUTPATIENT
Start: 2018-06-16 | End: 2018-06-18 | Stop reason: HOSPADM

## 2018-06-16 RX ADMIN — SODIUM CHLORIDE 250 ML: 9 INJECTION, SOLUTION INTRAVENOUS at 18:37

## 2018-06-16 RX ADMIN — SPIRONOLACTONE 25 MG: 25 TABLET ORAL at 08:28

## 2018-06-16 RX ADMIN — FUROSEMIDE 40 MG: 40 TABLET ORAL at 08:28

## 2018-06-16 RX ADMIN — FOLIC ACID 1 MG: 1 TABLET ORAL at 08:28

## 2018-06-16 RX ADMIN — POLYETHYLENE GLYCOL 3350 17 G: 17 POWDER, FOR SOLUTION ORAL at 08:43

## 2018-06-16 RX ADMIN — THIAMINE HYDROCHLORIDE 250 MG: 100 INJECTION, SOLUTION INTRAMUSCULAR; INTRAVENOUS at 08:29

## 2018-06-16 NOTE — PLAN OF CARE
Problem: Fluid Volume Excess (Adult)  Goal: Identify Related Risk Factors and Signs and Symptoms  Related risk factors and signs and symptoms are identified upon initiation of Human Response Clinical Practice Guideline (CPG).   Pt quiet, alert and was able to state correct place ,month and year. Has denied pain or discomfort. No new issues noted

## 2018-06-16 NOTE — PROGRESS NOTES
[facility]    Sepsis Evaluation Progress Note    Date of Service: 06/16/2018    I was called to see Karen Tee due to abnormal vital signs triggering the Sepsis SIRS screening alert. She is not known to have an infection.     Physical Exam    Vital Signs:  Temp: 98.3  F (36.8  C) Temp src: Oral BP: (!) 87/56 Pulse: 97 Heart Rate: 100 Resp: 18 SpO2: 97 % O2 Device: None (Room air)      Lab:  Lactic Acid   Date Value Ref Range Status   06/16/2018 2.2 (H) 0.7 - 2.0 mmol/L Final     Comment:     Significant value called to and read back by  VITO SALMERON RN BY GH [id number] 06/16/2018       Lactate for Sepsis Protocol   Date Value Ref Range Status   06/15/2018 1.4 0.4 - 1.9 mmol/L Final       The patient is at baseline mental status.    The rest of their physical exam is significant for stable, end stage cirrhosis .    Assessment and Plan    The SIRS and exam findings are likely due to end stage cirrhosis, there is no sign of sepsis at this time.    Disposition: The patient will remain on the current unit. We will continue to monitor this patient closely.    Garth Shepard MD

## 2018-06-16 NOTE — PROGRESS NOTES
Reason for Follow up: DC planning    Anticipated discharge needs: Pt cont to need TCU on dc.   Pt has been declined at [facility] (Main: [phone] Admissions: [phone] Fax: [phone]) and [facility] [address] (Phone: [phone] Fax: [phone]) and [facility] (Phone: [phone] Admissions: [phone] Fax: [phone]).     Pt has been accepted at [facility] (Phone: [phone] Fax: [phone]) for Monday-not able to take over the weekend.    Referrals cont to pend at [facility] Phone: [phone]) Fax: [phone])   and [facility] TCU Phone: (Admissions: [phone] RN Report: [phone] Fax: [phone]) . Both [facility] reported that they can assess pt on Monday, will not accept over the weekend.     Next steps: CTS to follow    Trinity SALEEM, [facility], [facility] [phone]  Discharge Planner   Discharge Plans in progress: TCU  Barriers to discharge plan: medical stability  Follow up plan: CTS to follow       Entered by: Trinity Rodgers 06/16/2018 11:31 AM

## 2018-06-16 NOTE — PROGRESS NOTES
Pt has been awake, alert oriented. Eating and drinking well. Had good results after taking Mirilax. Is unsteady and requires assist with all transferring. Triggered the sepsis protocol tonight. Lactic acid is 2.2 Infusing a bolus, blood cultures being drawn. VSS. Afebrile.

## 2018-06-16 NOTE — PROGRESS NOTES
"   06/16/18 1100   Signing Clinician's Name / Credentials   Signing clinician's name / credentials HILDA García   Quick Adds   Rehab Discipline OT   Therapeutic Activity   Minutes of Treatment 8 minutes   Symptoms Noted During/After Treatment None   Treatment Detail Pt lying in bed agreeable to get up. Min/mod A supine to sit EOB. Able to follow directions to don socks I'ly. Able to follow directions easily for room mobility. Pt showing expression and engaging in conversation.   Additional Documentation   OT Plan rescreen cognition   [facility] AM-PAC  \"6 Clicks\" Daily Activity Inpatient Short Form   1. Putting on and taking off regular lower body clothing? 3 - A Little   2. Bathing (including washing, rinsing, drying)? 3 - A Little   3. Toileting, which includes using toilet, bedpan or urinal? 3 - A Little   4. Putting on and taking off regular upper body clothing? 4 - None   5. Taking care of personal grooming such as brushing teeth? 4 - None   6. Eating meals? 4 - None   Daily Activity Raw Score (Score out of 24.Lower scores equate to lower levels of function) 21   Total Session Time   Total Session Time (minutes) 8 minutes     HILDA García  "

## 2018-06-16 NOTE — PROGRESS NOTES
SUBJECTIVE:   Ox4 today so some improvement in cognitive function  Eating some   Maintaining fluid restriction  Still weak, slightly unsteady.       ROS:4 point ROS including Respiratory, CV, GI and , other than that noted in the HPI,  is negative     CMP  Recent Labs  Lab 06/16/18  0515 06/15/18  2304 06/15/18  1424 06/15/18  0515  06/14/18  1215  06/14/18  0440  06/13/18  1556  06/13/18  0527  06/12/18  1036   * 124* 122* 119*  < > 119*  < > 117*  < >  --   < > 116*  < > 113*   POTASSIUM 3.6  --   --  3.8  --  3.8  --  3.6  --   --   --  3.7  --  3.6   CHLORIDE 88*  --   --  85*  --  84*  --  84*  --   --   --  82*  --  78*   CO2 26  --   --  26  --  23  --  25  --   --   --  24  --  20   ANIONGAP 10  --   --  8  --  12  --  8  --   --   --  10  --  15*   GLC 97  --   --  98  --  105*  --  102*  --   --   --  91  --  104*   BUN 15  --   --  14  --  13  --  13  --   --   --  16  --  15   CR 0.58  --   --  0.60  --  0.60  --  0.56  --   --   --  0.57  --  0.57   GFRESTIMATED >90  --   --  >90  --  >90  --  >90  --   --   --  >90  --  >90   GFRESTBLACK >90  --   --  >90  --  >90  --  >90  --   --   --  >90  --  >90   LYN 7.7*  --   --  7.8*  --  8.3*  --  7.8*  --   --   --  7.6*  --  8.6   PROTTOTAL  --   --   --  6.0*  --   --   --  6.3*  --   --   --  5.8*  --  7.2   ALBUMIN  --   --   --  2.4*  --   --   --  2.7*  --  2.1*  --  2.0*  --  2.4*   BILITOTAL  --   --   --  1.1  --   --   --  1.3  --   --   --  1.3  --  1.6*   ALKPHOS  --   --   --  317*  --   --   --  323*  --   --   --  361*  --  415*   AST  --   --   --  141*  --   --   --  152*  --   --   --  150*  --  148*   ALT  --   --   --  15  --   --   --  13  --   --   --  18  --  20   < > = values in this interval not displayed.  CBC  Recent Labs  Lab 06/16/18  0515 06/15/18  0515 06/14/18  0440 06/13/18  0527   WBC 14.1* 12.7* 11.1* 12.5*   RBC 2.16* 2.19* 2.15* 2.19*   HGB 7.2* 7.4* 7.1* 7.3*   HCT 20.6* 21.0* 20.6* 20.7*   MCV 95 96 96 95   MCH  33.3* 33.8* 33.0 33.3*   MCHC 35.0 35.2 34.5 35.3   RDW 15.0 14.9 14.7 14.4    255 281 298     INR  Recent Labs  Lab 06/13/18  0527 06/11/18  1424   INR 1.44* 1.45*     Arterial BloodGas  No lab results found in last 7 days.   Venous Blood Gas  No lab results found in last 7 days.    Medications     folic acid  1 mg Oral Daily     furosemide  40 mg Oral Daily     lidocaine (buffered or not buffered)  5-10 mL Intradermal Once     polyethylene glycol  17 g Oral Daily     sodium chloride (PF)  3 mL Intracatheter Q8H     spironolactone  25 mg Oral Daily     thiamine  250 mg Intravenous Daily       Intake/Output Summary (Last 24 hours) at 06/16/18 0807  Last data filed at 06/15/18 1924   Gross per 24 hour   Intake              670 ml   Output              150 ml   Net              520 ml         ASSESSMENT: PLAN:   Assessment & Plan      Karen Tee is a 62 year old female with a past medical history significant for alcohol dependence, cirrhosis/alcohol hepatitis with ascites 2010, dyslipidemia, depression, and tobacco dependence who presents on 6/12/2018 with unsteady gait, shortness of breath, abdominal bloating and hyponatremia.          Hyponatremia, severe   Went to clinic on 6/11/18 presenting with unsteady gait and shortness of breath, labs collected and sodium returned 112 after patient left clinic. Was called and told to go to emergency department 6/12, presented with sodium 113. Was given about 800cc's NS in emergency department, recheck sodium up to 115. Suspect hyponatremia primarily due to cirrhosis, though this is unusually low for cirrhosis and cirrhosis diagnosis is not certain (see below).    Started furosemide and spironolactone 6/12. Patient did not have much urine output. Has massive ascites needing tap, so complex situation. Sodium peaked at 117 at midnight and drifting down to 115 at noon. Held further furosemide.    Discussed 6/13 with Hepatologist who agreed with holding on further  diuresis since patient to have large volume paracentesis today, continue fluid restriction, follow sodium. Suggested also that may have other etiology for hyponatremia in addition to liver disease as the degree of hyponatremia is unusual, especially in comparison to degree of liver pathology biochemically.    - Sodium checks every 6 hours. If correction > 6 mEq in 24 hours, would consider adding free water but not DDAVP since patient is likely already high in ADH. Would not expect patient's sodium to normalize, may have some low sodium chronically   6/14/2018 no improvement in Na with 1500 cc fluid restriction, will try 1200.  Will need to add some lasix/wilmar to prevent recurrence. Will try to discuss with GI goals for hyponatremia in this setting.    6/15/2018 finally moved to 121 and now 119.  Unclear how high we can get this.    6/16/2018 finally up to 124 with lasix 40 daily and 1200 cc fluid restriction.  This is about as good as we can get it.         cirrhosis given alcohol history and marked ascites   Presented July 2010 with hyponatremia 124, marked ascites, hgb 5.1 and had been drinking heavily. CT 2010 showed hepatomegaly with fatty infiltration, EGD showed no varices, hepatitis A/B/C serologies negative and had Hep A and Hep B vaccine series following that. Seen by Dr. Adonay Glover, Hepatology, at that time. Patient required multiple large volume paracentesis July to November 2010. Patient stopped alcohol in July and after November, needed no further paracentesis. Patient did treatment at [facility] and was dry 5 years she says today. Started drinking again about 3 years ago.      Noted abdominal distention for the past 2-3 weeks. Protuberant, fluid-filled abdomen on exam but non-tender. LFT's show alcohol hepatitis pattern, decreased albumin 2.0 and Tprotein 5.8, INR 1.45 but normal platelets. Leucocytosis otherwise nothing to indicate SBP.    Discussed with hepatology  6/13, -->paracentesis of 8 liters  ": RUQ US with doppler no portal vein thrombosis.  .   6/14/2018 no evidence of portal clots, but severe portal hypertension, fatty liver all point to cirrhosis.  Will need lasix and wilmar to prevent recurrence of ascites.  No SBP.  Bili and plts normal but INR elevated..    6/15/2018 will try change to oral lasix 40 daily .  BP low now and will see if it recovers with lower dose.   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  SLUMs 19.  Is really unsafe for independent living.  Will need TCU at least for a while.    -some improvement and I expect some gradual improvement as her electrolyte status improves and she is further from her alcohol use.       Hypotension without hypoperfusion:   -BPs low .   But no evidence of hypoperfusion.  Will try not to go up on fluids as she just puts it in her abdomen.          Pulmonary vascular congestion:  -CXR suggests some vascular congestion.  Better with lasix.  Unclear if really CHF or just diffuse volume overload from cirrhosis.       Leukocytosis  Presented with WBC 16.5, afebrile. Chest x-ray negative for infiltrate, but does show evidence of fluid overload. No abdominal pain or tenderness, so low suspicion for spontaneous bacterial peritonitis at this time. Tap pending. WBC 14  - No antibiotics        Anemia   Likely due to chronic alcohol use. Hgb 7.3. No evidence of bleeding.    - following, check studies      Elevated uric acid  Presented with uric acid 8.2. No current evidence for gout.       Alcohol dependence  Daily alcohol use, at least 6-7 oz jes per day. No known history of seizures. Risk for withdrawal during current hospitalization. 1.75 liter lasts \"nearly a week\". Last drink 6 days ago. No evidence of withdrawal.   - [facility] protocol  - Prn Ativan use for withdrawal symptoms   - thiamine high dose   - folic acid      Tobacco use disorder  Smokes about 1/2 pack per day. Encourage cessation.  - Nicotine " gum available      Chronic depressive personality disorder  Previously diagnosed. No longer taking any medications for depression. Defer to PCP for outpatient management.      Hyperlipidemia  Noted per problem list. Not currently taking any statin or other lipid lowering medication. Defer to PCP for outpatient management.          Fluids: Oral, fluid restriction  Electrolytes: Monitor  Nutrition: Regular diet      DVT Prophylaxis: Pneumatic Compression Devices  Code Status: Full Code - discussed directly with patient      Lines: Peripheral  Palm catheter: Not indicated      Discussion: Na finially moved with lasix and fluid restriction.  But now BP low.  Backing off on lasix some.   Will need TCU for weakness and cognitive impairment, which is showing some improvement.  .

## 2018-06-16 NOTE — PLAN OF CARE
Problem: Patient Care Overview  Goal: Plan of Care/Patient Progress Review  OT SUMMARY D/C:  Patient plan for D/C:   TCU    Current Status:   Min A to SBA for ADLs and related mobility. Able to easily follow directions for mobility, donning socks. Able to participate in conversation    Barriers to return to prior living situation:  Lives alone    Recommendation for D/C: TCU    Rationale for D/C recommendations:  To increase strength for functional ADL I and determine cognitively if she needs more A at home.    Dennise Martinez, OTR

## 2018-06-16 NOTE — PLAN OF CARE
Problem: Patient Care Overview  Goal: Plan of Care/Patient Progress Review  Discharge Planner PT   Patient plan for discharge: TCU  Current status: Min assist for supine to sit and CGA required for ambulation  Barriers to return to prior living situation: Difficulty with ambulation and decreased LE strength  Recommendations for discharge: TCU  Rationale for recommendations: LE weakness and difficulty with ambulation       Entered by: Charles Barton 06/16/2018 1:25 PM

## 2018-06-16 NOTE — PLAN OF CARE
Problem: Alcohol Withdrawal Acute, Risk/Actual (Adult)  Goal: Signs and Symptoms of Listed Potential Problems Will be Absent, Minimized or Managed (Alcohol Withdrawal Acute, Risk/Actual)  Signs and symptoms of listed potential problems will be absent, minimized or managed by discharge/transition of care (reference Alcohol Withdrawal Acute, Risk/Actual (Adult) CPG).   Pt did use the call light  For assist to the BR. She voided but missed the hat. Dependent edema noted around the abdomen. Pt has no new complaints. Remains pleasant.

## 2018-06-16 NOTE — PLAN OF CARE
"Problem: Fluid Volume Excess (Adult)  Goal: Identify Related Risk Factors and Signs and Symptoms  Related risk factors and signs and symptoms are identified upon initiation of Human Response Clinical Practice Guideline (CPG).   Pt continues to deny need to urinate, refuses to get up to walk to the BR \" I don't have to go\" given ice and pop to drink. Alert, calm and pleasant.      "

## 2018-06-17 ENCOUNTER — APPOINTMENT (OUTPATIENT)
Dept: OCCUPATIONAL THERAPY | Facility: CLINIC | Age: 63
DRG: 432 | End: 2018-06-17
Payer: MEDICARE

## 2018-06-17 LAB
ABO + RH BLD: NORMAL
ABO + RH BLD: NORMAL
ALBUMIN SERPL-MCNC: 2.2 G/DL (ref 3.4–5)
ALP SERPL-CCNC: 300 U/L (ref 40–150)
ALT SERPL W P-5'-P-CCNC: 13 U/L (ref 0–50)
ANION GAP SERPL CALCULATED.3IONS-SCNC: 10 MMOL/L (ref 3–14)
AST SERPL W P-5'-P-CCNC: 118 U/L (ref 0–45)
BILIRUB SERPL-MCNC: 0.8 MG/DL (ref 0.2–1.3)
BLD GP AB SCN SERPL QL: NORMAL
BLD PROD TYP BPU: NORMAL
BLD PROD TYP BPU: NORMAL
BLD UNIT ID BPU: 0
BLOOD BANK CMNT PATIENT-IMP: NORMAL
BLOOD PRODUCT CODE: NORMAL
BPU ID: NORMAL
BUN SERPL-MCNC: 14 MG/DL (ref 7–30)
CALCIUM SERPL-MCNC: 7.7 MG/DL (ref 8.5–10.1)
CHLORIDE SERPL-SCNC: 93 MMOL/L (ref 94–109)
CO2 SERPL-SCNC: 25 MMOL/L (ref 20–32)
CREAT SERPL-MCNC: 0.6 MG/DL (ref 0.52–1.04)
ERYTHROCYTE [DISTWIDTH] IN BLOOD BY AUTOMATED COUNT: 15.5 % (ref 10–15)
FOLATE SERPL-MCNC: 7.8 NG/ML
GFR SERPL CREATININE-BSD FRML MDRD: >90 ML/MIN/1.7M2
GLUCOSE SERPL-MCNC: 92 MG/DL (ref 70–99)
HCT VFR BLD AUTO: 20.1 % (ref 35–47)
HGB BLD-MCNC: 7 G/DL (ref 11.7–15.7)
LACTATE BLD-SCNC: 1.3 MMOL/L (ref 0.7–2)
MCH RBC QN AUTO: 33.8 PG (ref 26.5–33)
MCHC RBC AUTO-ENTMCNC: 34.8 G/DL (ref 31.5–36.5)
MCV RBC AUTO: 97 FL (ref 78–100)
NUM BPU REQUESTED: 1
PLATELET # BLD AUTO: 219 10E9/L (ref 150–450)
POTASSIUM SERPL-SCNC: 3.8 MMOL/L (ref 3.4–5.3)
PROT SERPL-MCNC: 5.7 G/DL (ref 6.8–8.8)
RBC # BLD AUTO: 2.07 10E12/L (ref 3.8–5.2)
SODIUM SERPL-SCNC: 128 MMOL/L (ref 133–144)
SODIUM SERPL-SCNC: 129 MMOL/L (ref 133–144)
SODIUM SERPL-SCNC: 130 MMOL/L (ref 133–144)
SPECIMEN EXP DATE BLD: NORMAL
TRANSFUSION STATUS PATIENT QL: NORMAL
TRANSFUSION STATUS PATIENT QL: NORMAL
VIT B12 SERPL-MCNC: 679 PG/ML (ref 193–986)
WBC # BLD AUTO: 13.2 10E9/L (ref 4–11)

## 2018-06-17 PROCEDURE — P9016 RBC LEUKOCYTES REDUCED: HCPCS | Performed by: INTERNAL MEDICINE

## 2018-06-17 PROCEDURE — 25000132 ZZH RX MED GY IP 250 OP 250 PS 637: Mod: GY | Performed by: FAMILY MEDICINE

## 2018-06-17 PROCEDURE — 36415 COLL VENOUS BLD VENIPUNCTURE: CPT | Performed by: FAMILY MEDICINE

## 2018-06-17 PROCEDURE — 12000011 ZZH R&B MS OVERFLOW

## 2018-06-17 PROCEDURE — A9270 NON-COVERED ITEM OR SERVICE: HCPCS | Mod: GY | Performed by: FAMILY MEDICINE

## 2018-06-17 PROCEDURE — 86850 RBC ANTIBODY SCREEN: CPT | Performed by: INTERNAL MEDICINE

## 2018-06-17 PROCEDURE — 99233 SBSQ HOSP IP/OBS HIGH 50: CPT | Performed by: FAMILY MEDICINE

## 2018-06-17 PROCEDURE — 80053 COMPREHEN METABOLIC PANEL: CPT | Performed by: FAMILY MEDICINE

## 2018-06-17 PROCEDURE — 83605 ASSAY OF LACTIC ACID: CPT | Performed by: FAMILY MEDICINE

## 2018-06-17 PROCEDURE — 86900 BLOOD TYPING SEROLOGIC ABO: CPT | Performed by: INTERNAL MEDICINE

## 2018-06-17 PROCEDURE — 97530 THERAPEUTIC ACTIVITIES: CPT | Mod: GO

## 2018-06-17 PROCEDURE — 25000132 ZZH RX MED GY IP 250 OP 250 PS 637: Mod: GY | Performed by: INTERNAL MEDICINE

## 2018-06-17 PROCEDURE — 86901 BLOOD TYPING SEROLOGIC RH(D): CPT | Performed by: INTERNAL MEDICINE

## 2018-06-17 PROCEDURE — 40000133 ZZH STATISTIC OT WARD VISIT

## 2018-06-17 PROCEDURE — 86923 COMPATIBILITY TEST ELECTRIC: CPT | Performed by: INTERNAL MEDICINE

## 2018-06-17 PROCEDURE — 84295 ASSAY OF SERUM SODIUM: CPT | Performed by: FAMILY MEDICINE

## 2018-06-17 PROCEDURE — 82746 ASSAY OF FOLIC ACID SERUM: CPT | Performed by: FAMILY MEDICINE

## 2018-06-17 PROCEDURE — 25000128 H RX IP 250 OP 636: Performed by: INTERNAL MEDICINE

## 2018-06-17 PROCEDURE — 85027 COMPLETE CBC AUTOMATED: CPT | Performed by: FAMILY MEDICINE

## 2018-06-17 PROCEDURE — A9270 NON-COVERED ITEM OR SERVICE: HCPCS | Mod: GY | Performed by: INTERNAL MEDICINE

## 2018-06-17 RX ADMIN — THIAMINE HYDROCHLORIDE 250 MG: 100 INJECTION, SOLUTION INTRAMUSCULAR; INTRAVENOUS at 08:38

## 2018-06-17 RX ADMIN — POLYETHYLENE GLYCOL 3350 17 G: 17 POWDER, FOR SOLUTION ORAL at 08:32

## 2018-06-17 RX ADMIN — SPIRONOLACTONE 25 MG: 25 TABLET ORAL at 08:33

## 2018-06-17 RX ADMIN — FUROSEMIDE 40 MG: 40 TABLET ORAL at 08:32

## 2018-06-17 RX ADMIN — FOLIC ACID 1 MG: 1 TABLET ORAL at 08:32

## 2018-06-17 NOTE — PLAN OF CARE
Problem: Patient Care Overview  Goal: Plan of Care/Patient Progress Review  OT SUMMARY D/C:  Patient plan for D/C:   TCU    Current Status:   SBA LB dressing and transfer bed to chair using RW. Needs VCs for good/safe technique with walker. I bed mobility. RE-did SLUMs scored 24/30, min cog decline ( scored 19/30 on 6-14-18 ).    Barriers to return to prior living situation:   Weakness, decreased endurance, ADL impairment and related mobility    Recommendation for D/C:  TCU    Rationale for D/C recommendations:  Pt lives alone and was I with ADLs and related mobility without walker    Dennise Martinez, OTR

## 2018-06-17 NOTE — PROGRESS NOTES
06/17/18 1200   Signing Clinician's Name / Credentials   Signing clinician's name / credentials HILDA García   Quick Adds   Rehab Discipline OT   Cognitive Assessments   Cognitive Assessments Completed [facility] Mental Status Exam ([facility]):  Total Score out of /30 24/30   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   Domains assessed: orientation, memory, attention, executive functions   Additional Documentation   OT Plan 15     HILDA García

## 2018-06-17 NOTE — PROGRESS NOTES
Pt slept well thru the night. Up to BR x 1 using walker with SBA. Pt voicing no complaints this am. BP remains soft but stable.

## 2018-06-17 NOTE — PROGRESS NOTES
"SUBJECTIVE:   Feels stronger, eating better  No increase in abd distension.   Still weak, needs help to move about.       ROS:4 point ROS including Respiratory, CV, GI and , other than that noted in the HPI,  is negative     OBJECTIVE:   BP 96/54 (BP Location: Right arm)  Pulse 94  Temp 97.4  F (36.3  C) (Oral)  Resp 16  Ht 1.6 m (5' 3\")  Wt 66 kg (145 lb 8.1 oz)  LMP 05/19/2007  SpO2 99%  BMI 25.77 kg/m2    GENERAL APPEARANCE:  Awake, Ox4     RESP:clear      CV: regular rate and rhythm,  No  murmur , edema: none       Abdomen: soft, nontender, no liver or spleen enlargement, no masses, BSs normal   Skin: no cyanosis, pallor, or jaundice     CMP  Recent Labs  Lab 06/17/18  0503 06/16/18  2247 06/16/18  1411 06/16/18  0515  06/15/18  0515  06/14/18  1215  06/14/18  0440  06/13/18  1556  06/13/18  0527   * 126* 124* 124*  < > 119*  < > 119*  < > 117*  < >  --   < > 116*   POTASSIUM 3.8  --   --  3.6  --  3.8  --  3.8  --  3.6  --   --   --  3.7   CHLORIDE 93*  --   --  88*  --  85*  --  84*  --  84*  --   --   --  82*   CO2 25  --   --  26  --  26  --  23  --  25  --   --   --  24   ANIONGAP 10  --   --  10  --  8  --  12  --  8  --   --   --  10   GLC 92  --   --  97  --  98  --  105*  --  102*  --   --   --  91   BUN 14  --   --  15  --  14  --  13  --  13  --   --   --  16   CR 0.60  --   --  0.58  --  0.60  --  0.60  --  0.56  --   --   --  0.57   GFRESTIMATED >90  --   --  >90  --  >90  --  >90  --  >90  --   --   --  >90   GFRESTBLACK >90  --   --  >90  --  >90  --  >90  --  >90  --   --   --  >90   LYN 7.7*  --   --  7.7*  --  7.8*  --  8.3*  --  7.8*  --   --   --  7.6*   PROTTOTAL 5.7*  --   --   --   --  6.0*  --   --   --  6.3*  --   --   --  5.8*   ALBUMIN 2.2*  --   --   --   --  2.4*  --   --   --  2.7*  --  2.1*  --  2.0*   BILITOTAL 0.8  --   --   --   --  1.1  --   --   --  1.3  --   --   --  1.3   ALKPHOS 300*  --   --   --   --  317*  --   --   --  323*  --   --   --  361*   * "  --   --   --   --  141*  --   --   --  152*  --   --   --  150*   ALT 13  --   --   --   --  15  --   --   --  13  --   --   --  18   < > = values in this interval not displayed.  CBC  Recent Labs  Lab 06/17/18  0503 06/16/18  0515 06/15/18  0515 06/14/18  0440   WBC 13.2* 14.1* 12.7* 11.1*   RBC 2.07* 2.16* 2.19* 2.15*   HGB 7.0* 7.2* 7.4* 7.1*   HCT 20.1* 20.6* 21.0* 20.6*   MCV 97 95 96 96   MCH 33.8* 33.3* 33.8* 33.0   MCHC 34.8 35.0 35.2 34.5   RDW 15.5* 15.0 14.9 14.7    251 255 281     INR  Recent Labs  Lab 06/13/18  0527 06/11/18  1424   INR 1.44* 1.45*     Arterial BloodGas  No lab results found in last 7 days.   Venous Blood Gas  No lab results found in last 7 days.    Medications     folic acid  1 mg Oral Daily     furosemide  40 mg Oral Daily     lidocaine (buffered or not buffered)  5-10 mL Intradermal Once     polyethylene glycol  17 g Oral Daily     sodium chloride (PF)  3 mL Intracatheter Q8H     spironolactone  25 mg Oral Daily     thiamine  250 mg Intravenous Daily       Intake/Output Summary (Last 24 hours) at 06/17/18 0758  Last data filed at 06/16/18 1200   Gross per 24 hour   Intake              520 ml   Output                0 ml   Net              520 ml         Assessment & Plan      Karen Tee is a 62 year old female with a past medical history significant for alcohol dependence, cirrhosis/alcohol hepatitis with ascites 2010, dyslipidemia, depression, and tobacco dependence who presents on 6/12/2018 with unsteady gait, shortness of breath, abdominal bloating and hyponatremia.          Hyponatremia, severe   Went to clinic on 6/11/18 presenting with unsteady gait and shortness of breath, labs collected and sodium returned 112 after patient left clinic. Was called and told to go to emergency department 6/12, presented with sodium 113. Was given about 800cc's NS in emergency department, recheck sodium up to 115. Suspect hyponatremia primarily due to cirrhosis, though this is  unusually low for cirrhosis and cirrhosis diagnosis is not certain (see below).    Started furosemide and spironolactone 6/12. Patient did not have much urine output. Has massive ascites needing tap, so complex situation. Sodium peaked at 117 at midnight and drifting down to 115 at noon. Held further furosemide.    Discussed 6/13 with Hepatologist who agreed with holding on further diuresis since patient to have large volume paracentesis today, continue fluid restriction, follow sodium. Suggested also that may have other etiology for hyponatremia in addition to liver disease as the degree of hyponatremia is unusual, especially in comparison to degree of liver pathology biochemically.    - Sodium checks every 6 hours. If correction > 6 mEq in 24 hours, would consider adding free water but not DDAVP since patient is likely already high in ADH. Would not expect patient's sodium to normalize, may have some low sodium chronically   -  no improvement in Na with 1500 cc fluid restriction, will try 1200.  Will need to add some lasix/wilmar to prevent recurrence of ascites and see if it helps the Na as well.  . .    -  finally up to 128 with lasix 40 daily and 1200 cc fluid restriction.  This is about as good as we can get it.         cirrhosis given alcohol history and marked ascites   Presented July 2010 with hyponatremia 124, marked ascites, hgb 5.1 and had been drinking heavily. CT 2010 showed hepatomegaly with fatty infiltration, EGD showed no varices, hepatitis A/B/C serologies negative and had Hep A and Hep B vaccine series following that. Seen by Dr. Adonay Glover, Hepatology, at that time. Patient required multiple large volume paracentesis July to November 2010. Patient stopped alcohol in July and after November, needed no further paracentesis. Patient did treatment at [facility] and was dry 5 years she says today. Started drinking again about 3 years ago.      Noted abdominal distention for the past 2-3 weeks.  Protuberant, fluid-filled abdomen on exam but non-tender. LFT's show alcohol hepatitis pattern, decreased albumin 2.0 and Tprotein 5.8, INR 1.45 but normal platelets. Leucocytosis otherwise nothing to indicate SBP.    Discussed with hepatology  6/13, -->paracentesis of 8 liters : RUQ US with doppler no portal vein thrombosis.  .   - no evidence of portal clots, but severe portal hypertension, fatty liver all point to cirrhosis.  Will need lasix and wilmar to prevent recurrence of ascites.  No SBP.  Bili and plts normal but INR elevated..    -will try change to oral lasix 40 daily .  BP low now and will see if it recovers with lower dose.  -  no recurrence of ascites on US.  Will continue the lasix 40, wilmar 25.  BP runs low but MAP>60 and no evidence of hypoperfusion.     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  SLUMs 19.  Is really unsafe for independent living.  Will need TCU at least for a while.    -some improvement and I expect some gradual improvement as her electrolyte status improves and she is further from her alcohol use.    -SLUMs up to 23      Hypotension without hypoperfusion:   -BPs low .   But no evidence of hypoperfusion.  Will try not to go up on fluids as she just puts it in her abdomen.          Acute pulmonary edema secondary to ETOH cirrhosis  -CXR suggests some vascular congestion.  Better with lasix.  Unclear if really CHF or just diffuse volume overload from cirrhosis.        Leukocytosis  Presented with WBC 16.5, afebrile. Chest x-ray negative for infiltrate, but does show evidence of fluid overload. No abdominal pain or tenderness, so low suspicion for spontaneous bacterial peritonitis at this time. Tap pending. WBC 14  - No antibiotics        Anemia   Likely due to chronic alcohol use. Hgb 7.3. No evidence of bleeding.    - finally hit low of 7.0.  Iron normal, will check  "B12/folate.  Transfuse 1 unit 6/17/2018.       Elevated uric acid  Presented with uric acid 8.2. No current evidence for gout.       Alcohol dependence  Daily alcohol use, at least 6-7 oz jes per day. No known history of seizures. Risk for withdrawal during current hospitalization. 1.75 liter lasts \"nearly a week\". Last drink 6 days ago. No evidence of withdrawal.   - [facility] protocol  - Prn Ativan use for withdrawal symptoms   - thiamine high dose   - folic acid      Tobacco use disorder  Smokes about 1/2 pack per day. Encourage cessation.  - Nicotine gum available      Chronic depressive personality disorder  Previously diagnosed. No longer taking any medications for depression. Defer to PCP for outpatient management.      Hyperlipidemia  Noted per problem list. Not currently taking any statin or other lipid lowering medication. Defer to PCP for outpatient management.          Fluids: Oral, fluid restriction  Electrolytes: Monitor  Nutrition: Regular diet      DVT Prophylaxis: Pneumatic Compression Devices  Code Status: Full Code - discussed directly with patient      Lines: Peripheral  Palm catheter: Not indicated      Discussion: Na finially moved with lasix and fluid restriction.  But now BP low.  Backing off on lasix some.   Will need TCU for weakness and cognitive impairment, which is showing some improvement.    With no significant LFT derangement and no recurrence of ascites, I told her I have hope the liver can regenerate enough to give her some quality of life if she reqains her strength and cognitive abilities and stays off the alcohol.  .                         "

## 2018-06-17 NOTE — PROGRESS NOTES
Patient tolerated transfusion  prbc , patient is ambulatory in room mainly to bathroom . Encourage frequently. B/P still running on low end but has been stable 90/50's. Patient mentation clear. Anticipates discharge to TCU tomorrow.

## 2018-06-17 NOTE — PROGRESS NOTES
06/17/18 1200   Signing Clinician's Name / Credentials   Signing clinician's name / credentials HILDA García   Quick Adds   Rehab Discipline OT   Cognitive Assessments   Cognitive Assessments Completed [facility] Mental Status Exam ([facility]):  Total Score out of /30 24/30   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   Domains assessed: orientation, memory, attention, executive functions   Additional Documentation   OT Plan toileting, stand at sink   Total Session Time   Total Session Time (minutes) 15 minutes     HILDA García

## 2018-06-18 ENCOUNTER — PATIENT OUTREACH (OUTPATIENT)
Dept: FAMILY MEDICINE | Facility: CLINIC | Age: 63
End: 2018-06-18

## 2018-06-18 VITALS
DIASTOLIC BLOOD PRESSURE: 58 MMHG | WEIGHT: 144.62 LBS | OXYGEN SATURATION: 96 % | BODY MASS INDEX: 25.62 KG/M2 | TEMPERATURE: 98.2 F | RESPIRATION RATE: 18 BRPM | HEART RATE: 96 BPM | HEIGHT: 63 IN | SYSTOLIC BLOOD PRESSURE: 95 MMHG

## 2018-06-18 LAB
ALBUMIN SERPL-MCNC: 2.1 G/DL (ref 3.4–5)
ALP SERPL-CCNC: 281 U/L (ref 40–150)
ALT SERPL W P-5'-P-CCNC: 12 U/L (ref 0–50)
ANION GAP SERPL CALCULATED.3IONS-SCNC: 12 MMOL/L (ref 3–14)
AST SERPL W P-5'-P-CCNC: 93 U/L (ref 0–45)
BACTERIA SPEC CULT: NO GROWTH
BILIRUB SERPL-MCNC: 1 MG/DL (ref 0.2–1.3)
BUN SERPL-MCNC: 15 MG/DL (ref 7–30)
CALCIUM SERPL-MCNC: 7.7 MG/DL (ref 8.5–10.1)
CHLORIDE SERPL-SCNC: 96 MMOL/L (ref 94–109)
CO2 SERPL-SCNC: 22 MMOL/L (ref 20–32)
CREAT SERPL-MCNC: 0.61 MG/DL (ref 0.52–1.04)
ERYTHROCYTE [DISTWIDTH] IN BLOOD BY AUTOMATED COUNT: 16.1 % (ref 10–15)
GFR SERPL CREATININE-BSD FRML MDRD: >90 ML/MIN/1.7M2
GLUCOSE SERPL-MCNC: 86 MG/DL (ref 70–99)
HCT VFR BLD AUTO: 25.1 % (ref 35–47)
HGB BLD-MCNC: 8.7 G/DL (ref 11.7–15.7)
Lab: NORMAL
MCH RBC QN AUTO: 33.3 PG (ref 26.5–33)
MCHC RBC AUTO-ENTMCNC: 34.7 G/DL (ref 31.5–36.5)
MCV RBC AUTO: 96 FL (ref 78–100)
PLATELET # BLD AUTO: 189 10E9/L (ref 150–450)
POTASSIUM SERPL-SCNC: 4 MMOL/L (ref 3.4–5.3)
PROT SERPL-MCNC: 5.8 G/DL (ref 6.8–8.8)
RBC # BLD AUTO: 2.61 10E12/L (ref 3.8–5.2)
SODIUM SERPL-SCNC: 130 MMOL/L (ref 133–144)
SPECIMEN SOURCE: NORMAL
WBC # BLD AUTO: 13.5 10E9/L (ref 4–11)

## 2018-06-18 PROCEDURE — 80053 COMPREHEN METABOLIC PANEL: CPT | Performed by: FAMILY MEDICINE

## 2018-06-18 PROCEDURE — 36415 COLL VENOUS BLD VENIPUNCTURE: CPT | Performed by: FAMILY MEDICINE

## 2018-06-18 PROCEDURE — 99239 HOSP IP/OBS DSCHRG MGMT >30: CPT | Performed by: INTERNAL MEDICINE

## 2018-06-18 PROCEDURE — 25000132 ZZH RX MED GY IP 250 OP 250 PS 637: Mod: GY | Performed by: INTERNAL MEDICINE

## 2018-06-18 PROCEDURE — 84295 ASSAY OF SERUM SODIUM: CPT | Performed by: FAMILY MEDICINE

## 2018-06-18 PROCEDURE — 25000128 H RX IP 250 OP 636: Performed by: INTERNAL MEDICINE

## 2018-06-18 PROCEDURE — 85027 COMPLETE CBC AUTOMATED: CPT | Performed by: FAMILY MEDICINE

## 2018-06-18 PROCEDURE — A9270 NON-COVERED ITEM OR SERVICE: HCPCS | Mod: GY | Performed by: FAMILY MEDICINE

## 2018-06-18 PROCEDURE — A9270 NON-COVERED ITEM OR SERVICE: HCPCS | Mod: GY | Performed by: INTERNAL MEDICINE

## 2018-06-18 PROCEDURE — 25000132 ZZH RX MED GY IP 250 OP 250 PS 637: Mod: GY | Performed by: FAMILY MEDICINE

## 2018-06-18 RX ORDER — SPIRONOLACTONE 25 MG/1
25 TABLET ORAL DAILY
Qty: 30 TABLET | DISCHARGE
Start: 2018-06-19 | End: 2018-07-02

## 2018-06-18 RX ORDER — FOLIC ACID 1 MG/1
1 TABLET ORAL DAILY
Qty: 30 TABLET | DISCHARGE
Start: 2018-06-19 | End: 2018-07-02

## 2018-06-18 RX ORDER — FUROSEMIDE 40 MG
40 TABLET ORAL DAILY
Qty: 30 TABLET | DISCHARGE
Start: 2018-06-19 | End: 2018-07-02

## 2018-06-18 RX ADMIN — THIAMINE HYDROCHLORIDE 250 MG: 100 INJECTION, SOLUTION INTRAMUSCULAR; INTRAVENOUS at 08:38

## 2018-06-18 RX ADMIN — SPIRONOLACTONE 25 MG: 25 TABLET ORAL at 08:38

## 2018-06-18 RX ADMIN — FUROSEMIDE 40 MG: 40 TABLET ORAL at 08:37

## 2018-06-18 RX ADMIN — FOLIC ACID 1 MG: 1 TABLET ORAL at 08:37

## 2018-06-18 NOTE — PLAN OF CARE
Problem: Patient Care Overview  Goal: Plan of Care/Patient Progress Review  Occupational Therapy Discharge Summary    Reason for therapy discharge:    Discharged to transitional care facility.    Progress towards therapy goal(s). See goals on Care Plan in [facility] electronic health record for goal details.  Goals partially met.  Barriers to achieving goals:   discharge from facility.    Therapy recommendation(s):    Continued therapy is recommended.  Rationale/Recommendations:  TCU to increase independence with ADLs and functional mobility. .   Continue to assess cognition and safety with IADLs: Pt completed SLUMS on 6/17/18 and scores 24/30

## 2018-06-18 NOTE — PROGRESS NOTES
Name: Karen Tee    MRN#: [id number]    Reason for Hospitalization: Hyponatremia [E87.1]    Discharge Date: 6/18/2018    Patient / Family response to discharge plan: Pt accepted at both [facility] Phone: (Admissions: [phone] RN Report: [phone] Fax: [phone])  And [facility] [place] (Phone: [phone] Fax: [phone]). Pt chooses [facility]. Pt will dc there today.     Other Providers (Care Coordinator, County Services, PCA services etc): No    CTS Hand Off Completed: Yes: completed    OZZIE Score: low    Future Appointments: No future appointments.    Discharge Disposition: transitional care unit    [facility], [facility], [facility] [phone]

## 2018-06-18 NOTE — LETTER
Hand-off  for Care Coordination  What is Care Coordination?  [facility] Care Coordination Services are available to people in complex situations,   for example medical, social or financial. The Care Coordinator, a SW or an RN, works with the   patient and their doctor to determine health goals, obtain resources, achieve outcomes,   and develop plans to coordinate care across settings.    o Patient Name:   Karen Tee  o Patient :     1955  o Patient PCP:     Tyler Khalil MD    o Patient Primary Clinic:   [address] [place] [id number]  o D/C Facility: [facility] ([phone]:        [phone]) .  o TCU Contact Info for questions: ___________________________  o D/C Date:  ______________________________________________  o Follow-up Apt with PCP after TCU D/C:   ____________________  o Other Follow-Up Apt s: ____________________________________  Additional information (concerns, and Home Care, ect ):   __________________________________________________________________  __________________________________________________________________        __________________________________________________________________    Care Coordinator to Contact  **Please fax this sheet back to me when pt is ready to discharge**  Sascha Marcum RN  Clinic Care Coordinator  Fax: [phone]  Phone: [phone]

## 2018-06-18 NOTE — PLAN OF CARE
Problem: Patient Care Overview  Goal: Plan of Care/Patient Progress Review  Physical Therapy Discharge Summary    Reason for therapy discharge:    Discharged to transitional care facility.    Progress towards therapy goal(s). See goals on Care Plan in [facility] electronic health record for goal details.  Goals partially met.  Barriers to achieving goals:   discharge from facility.    Therapy recommendation(s):    ongoing PT at TCU for LE weakness and difficulty with ambulation

## 2018-06-18 NOTE — PLAN OF CARE
Problem: Patient Care Overview  Goal: Plan of Care/Patient Progress Review  PT Cancel: Attempted to see patient this morning who just got back in bed after taking a shower and per RN did so mostly independently. Pt requesting to rest at this time prior to discharge to TCU at 3pm today.  This therapist did assist with repositioning patient in bed per request.

## 2018-06-18 NOTE — PROGRESS NOTES
Clinic Care Coordination Contact  Care Team Conversations  Patient currently hospitalized at [facility]. Plan is for DC to TCU this afternoon. RN CC received CTS handoff as follows:    Transition Communication Hand-off for Care Transitions to Next Level of Care Provider  Name: Karen Tee  : 1955  MRN #: [id number]  Primary Care Provider: Tyler Khalil  Primary Care MD Name: Dr. Khalil  Primary Clinic: [address] [place] [address]  [facility]  Reason for Hospitalization:  Hyponatremia [E87.1]  Admit Date/Time: 2018 10:55 AM  Discharge Date: 18  Payor Source: Payor: MEDICARE / Plan: MEDICARE / Product Type: Medicare /   Readmission Assessment Measure (OZZIE) Risk Score/category: low  Reason for Communication Hand-off Referral: Fragility  Discharge Plan: TCU  Concern for non-adherence with plan of care: No  Discharge Needs Assessment:  Needs        Most Recent Value     # of Referrals Placed by [facility] Care Coordination, Post Acute Facilities, Homecare      Follow-up plan:  No future appointments.  Any outstanding tests or procedures:        Referrals     Future Labs/Procedures     Occupational Therapy Adult Consult      Comments:     Evaluate and treat as clinically indicated.     Reason:  Weak cirrhosis     Physical Therapy Adult Consult      Comments:     Evaluate and treat as clinically indicated.     Reason:  Weak, cirrhosis      Key Recommendations:  Pt is discharging today to [facility] TCU Phone: (Admissions: [phone] RN Report: [phone] Fax: [phone]) . Pt in agreement with dc plan. Goal is for pt to return home. Per TALYA Esqueda she is committed to quitting drinking and smoking.     P: RN CC will continue to monitor chart and will f/u as needed after DC.    Sascha MATTHEWS,RN- BC  Clinic Care Coordinator  [facility]  Phone: [phone]

## 2018-06-18 NOTE — PROGRESS NOTES
[facility] DISCHARGE NOTE    Patient discharged to transitional care unit at 2:57 PM via wheel chair. Accompanied by other:[facility] transport and staff. Discharge instructions reviewed with patient, opportunity offered to ask questions. Prescriptions sent to patients preferred pharmacy. All belongings sent with patient.    Nichole C. Bushweiler

## 2018-06-18 NOTE — PLAN OF CARE
"Problem: Fluid Volume Excess (Adult)  Goal: Identify Related Risk Factors and Signs and Symptoms  Related risk factors and signs and symptoms are identified upon initiation of Human Response Clinical Practice Guideline (CPG).   Outcome: Improving  Patient's BP's remain soft. States \"I feel better since the transfusion.\" Abdomen rounded. Patient ambulating with walker and SBA. Na at 2200 = 130.  Is hopeful to discharge to TCU today.      "

## 2018-06-18 NOTE — DISCHARGE SUMMARY
[facility]    Discharge Summary  Hospital Medicine    Date of Admission:  6/12/2018  Date of Discharge:  6/18/2018   Discharging Provider: Mary Ellen Mazariegos  Date of Service: 6/18/2018     Primary Care     Tyler Khalil  [address] [facility] [address]      Assessment & Plan      Karen Tee is a 62 year old female with a past medical history significant for alcohol dependence, cirrhosis/alcohol hepatitis with ascites 2010, dyslipidemia, depression, and tobacco dependence who presents on 6/12/2018 with unsteady gait, shortness of breath, abdominal bloating and hyponatremia.          Hyponatremia, severe- improved to 130 from 112. Responded to fluid restriction and diuretics and large vol paracentesis        cirrhosis given alcohol history and marked ascites   Presented July 2010 with hyponatremia 124, marked ascites, hgb 5.1 and had been drinking heavily. CT 2010 showed hepatomegaly with fatty infiltration, EGD showed no varices, hepatitis A/B/C serologies negative and had Hep A and Hep B vaccine series following that. Seen by Dr. Adonay Glover, Hepatology, at that time. Patient required multiple large volume paracentesis July to November 2010. Patient stopped alcohol in July and after November, needed no further paracentesis. Patient did treatment at [facility] and was dry 5 years . Started drinking again about 3 years ago.      Noted abdominal distention for the past 2-3 weeks. Protuberant, fluid-filled abdomen on exam but non-tender. LFT's show alcohol hepatitis pattern, decreased albumin 2.0 and Tprotein 5.8, INR 1.45 but normal platelets. Leucocytosis otherwise nothing to indicate SBP.    Discussed with hepatology  6/13, -->paracentesis of 8 liters : RUQ US with doppler no portal vein thrombosis.  .   - no evidence of portal clots, but severe portal hypertension, fatty liver all point to cirrhosis.  Will need lasix and wilmar to prevent recurrence of ascites.  No SBP.    -  "lasix 40 daily .  spironolactone 25     -  Cognitive impairment:   -may be some alcohol dementia, but unclear how much is acute vs chronic .  SLUMs 19.  Is really unsafe for independent living.  Will need TCU at least for a while.    -some improvement and I expect some gradual improvement as her electrolyte status improves and she is further from her alcohol use.    -SLUMs up to 23   NH3- normal on 2 occasions           Pulmonary vascular congestion:  -CXR suggests some vascular congestion.  Better with lasix.  Unclear if really CHF or just diffuse volume overload from cirrhosis.        Leukocytosis  Presented with WBC 16.5, afebrile. Chest x-ray negative for infiltrate, but does show evidence of fluid overload. No abdominal pain or tenderness, so low suspicion for spontaneous bacterial peritonitis at this time.peritoneal cx neg. WBC 13.5 now  - No antibiotics        Anemia   Likely due to chronic alcohol use . Hgb 7.3. No evidence of bleeding. fe sats 65%, b12- 679, folate 7.8   - finally hit low of 7.0. Transfuse 1 unit 6/17/2018.       Elevated uric acid  Presented with uric acid 8.2. No current evidence for gout.       Alcohol dependence  Daily alcohol use, at least 6-7 oz jes per day. No known history of seizures. Risk for withdrawal during current hospitalization. 1.75 liter lasts \"nearly a week\". Last drink 6 days ago. No evidence of withdrawal in the hospital.      - thiamine high dose x 5 days   - folic acid      Tobacco use disorder  Smokes about 1/2 pack per day. Encourage cessation.  - Nicotine gum in hospital      Chronic depressive personality disorder  Previously diagnosed. No longer taking any medications for depression. Defer to PCP for outpatient management.      Hyperlipidemia  Noted per problem list. Not currently taking any statin or other lipid lowering medication. Defer to PCP for outpatient management.            Electrolytes: Monitor  Nutrition: Regular diet, 1200 cc fluid " restriction  Code Status: Full Code  Palm catheter: Not indicated        Discharge Disposition   To tcu    Discharge Orders     General info for SNF   Length of Stay Estimate: Short Term Care: Estimated # of Days <30  Condition at Discharge: Improving  Level of care:skilled   Rehabilitation Potential: Good  Admission H&P remains valid and up-to-date: Yes  Recent Chemotherapy: N/A  Use Nursing Home Standing Orders: N/A     Mantoux instructions   Give two-step Mantoux (PPD) Per Facility Policy     Daily weights   Call Provider for weight gain of more than 2 pounds per day or 5 pounds per week.     Activity - Up with nursing assistance     Follow Up (TCM)   Need cbc and BMP daily for 3 days and then as per NH doc.     Follow Up (TCM)   Follow up primary md 1 week     Full Code     Physical Therapy Adult Consult   Evaluate and treat as clinically indicated.    Reason:  Weak, cirrhosis     Occupational Therapy Adult Consult   Evaluate and treat as clinically indicated.    Reason:  Weak cirrhosis     Advance Diet as Tolerated   Follow this diet upon discharge:     1200 liter fluid restriction      Discharge Medications   Current Discharge Medication List      START taking these medications    Details   folic acid (FOLVITE) 1 MG tablet Take 1 tablet (1 mg) by mouth daily  Qty: 30 tablet    Associated Diagnoses: Alcoholic cirrhosis of liver without ascites (H)      furosemide (LASIX) 40 MG tablet Take 1 tablet (40 mg) by mouth daily  Qty: 30 tablet    Associated Diagnoses: Hyponatremia      spironolactone (ALDACTONE) 25 MG tablet Take 1 tablet (25 mg) by mouth daily  Qty: 30 tablet    Associated Diagnoses: Cirrhosis of liver with ascites, unspecified hepatic cirrhosis type (H)         STOP taking these medications       ibuprofen 200 MG capsule Comments:   Reason for Stopping:             Allergies   No Known Allergies    Consultations This Hospital Stay     PHYSICAL THERAPY ADULT IP CONSULT  OCCUPATIONAL THERAPY ADULT IP  CONSULT  CARE TRANSITION RN/SW IP CONSULT  PHYSICAL THERAPY ADULT IP CONSULT  OCCUPATIONAL THERAPY ADULT IP CONSULT    Significant Results and Procedures   Procedures    8 liter paracentesis    Blood transfusion, 1 unit    Data   Results for orders placed or performed during the hospital encounter of 06/12/18   Chest XR,  PA & LAT    Narrative    XR CHEST 2 VW 6/12/2018 1:27 PM    HISTORY: Short of breath. Weakness.    COMPARISON: 7/9/2010.      Impression    IMPRESSION: 2 views of the chest show low lung volumes. In addition,  there is a new small left pleural effusion. Pulmonary vessels are  mildly congested and slightly ill-defined with a small mild  peribronchial thickening. This raises the possibility of a small  amount interstitial pulmonary edema related to CHF.     GREG WALLIS MD   US Paracentesis    Narrative    US PARACENTESIS 6/13/2018 3:26 PM    HISTORY: Liver disease. Distention.    ULTRASOUND GUIDED PARACENTESIS:  Risks and benefits of the procedure  are discussed with the patient. Under sonographic guidance and  utilizing 8 mL 1% lidocaine as local anesthetic, a needle is inserted  into the left lower quadrant of the abdomen. This is followed by  placement of a standard 8 Serbian catheter. 8,000 mL of straw-colored  fluid is evacuated. The patient tolerated the procedure well.      Impression    IMPRESSION: Technically uneventful ultrasound-guided paracentesis, as  described above.    GREG WALLIS MD   US Abddomen Limited w Abd/Pelvis Duplex Complete    Narrative    ULTRASOUND ABDOMEN RIGHT UPPER QUADRANT WITH LIVER DOPPLER  6/13/2018  10:33 PM     HISTORY: Recurrent ascites.    COMPARISON: None.    TECHNIQUE: Spectral waveform and color Doppler evaluation were  performed.    FINDINGS: Mild to moderate diffuse increased hepatic echogenicity,  likely representing fatty infiltration. No hepatic masses. A few small  gallstones are present in a nondistended gallbladder. No convincing  gallbladder wall  thickening and no focal tenderness over the  gallbladder. No intra- or extrahepatic biliary dilatation. The common  duct measures between 0.6 and 0.7 cm in diameter, at the upper limits  of normal in caliber. The right kidney has normal size and  echogenicity, measuring 10.9 cm in length. No right intrarenal  collecting system dilatation, calculi or masses. A small amount of  perihepatic free fluid.    Hepatofugal blood flow is visualized within the main, left and right  portal veins. Unremarkable Doppler waveform evaluation of the main  hepatic artery and left and middle hepatic veins. The right hepatic  vein is not visualized. Relatively high velocity within the main  hepatic artery with a peak systolic velocity of 190 cm/s.      Impression    IMPRESSION:  1. Hepatofugal blood flow within the portal veins, consistent with  portal hypertension.  2. Mild to moderate diffuse fatty infiltration of the liver.   3. Cholelithiasis.  4. No biliary dilatation.  5. A small amount of perihepatic free fluid.    RONALD SALAZAR MD   XR Chest 2 Views    Narrative    XR CHEST 2 VW 6/15/2018 7:41 AM    HISTORY: Short of breath.    COMPARISON: 6/12/2018.      Impression    IMPRESSION: 2 views of the shoulder show slightly less pulmonary  vascular congestion and better definition of the pulmonary vessels.  This suggests resolving mild interstitial pulmonary edema related to  fluid overload and/or CHF. The small pleural effusion hasn't  significantly changed. Minimal right pleural fluid is also present.    GREG WALLIS MD   US Abdomen Limited    Narrative    ULTRASOUND ABDOMEN LIMITED 6/16/2018 9:02 AM     HISTORY: Assess ascites fluid volume.     COMPARISON: None.      Impression    IMPRESSION: Trace ascites.    RADHA UMAÑA MD       Pending Results   Unresulted Labs Ordered in the Past 30 Days of this Admission     Date and Time Order Name Status Description    6/16/2018 1818 Blood culture Preliminary     6/16/2018 1818 Blood  culture Preliminary     6/13/2018 1307 Anaerobic bacterial culture Preliminary           Physical Exam   Temp:  [97  F (36.1  C)-98.9  F (37.2  C)] 98.2  F (36.8  C)  Pulse:  [] 96  Heart Rate:  [102] 102  Resp:  [16-18] 18  BP: (87-96)/(53-58) 95/58  SpO2:  [96 %-97 %] 96 %  Vitals:    06/16/18 0446 06/17/18 0600 06/18/18 0317   Weight: 144 lb 13.5 oz (65.7 kg) 145 lb 8.1 oz (66 kg) 144 lb 10 oz (65.6 kg)       Constitutional: nad  CV: Regular  Respiratory: CTA bilaterally  GI: Soft, nontender,+bs  Skin: Warm and dry, no edema      The discharge plan was discussed with the patient     Total time on this discharge was 40 min.    Mary Ellen Mazariegos

## 2018-06-19 ENCOUNTER — AMBULATORY - HEALTHEAST (OUTPATIENT)
Dept: ADMINISTRATIVE | Facility: CLINIC | Age: 63
End: 2018-06-19

## 2018-06-19 ENCOUNTER — RECORDS - HEALTHEAST (OUTPATIENT)
Dept: LAB | Facility: CLINIC | Age: 63
End: 2018-06-19

## 2018-06-19 LAB
ANION GAP SERPL CALCULATED.3IONS-SCNC: 14 MMOL/L (ref 5–18)
APPEARANCE FLD: CLEAR
BASOPHILS # BLD AUTO: 0.1 THOU/UL (ref 0–0.2)
BASOPHILS NFR BLD AUTO: 1 % (ref 0–2)
BUN SERPL-MCNC: 12 MG/DL (ref 8–22)
CALCIUM SERPL-MCNC: 8.8 MG/DL (ref 8.5–10.5)
CHLORIDE BLD-SCNC: 96 MMOL/L (ref 98–107)
CO2 SERPL-SCNC: 19 MMOL/L (ref 22–31)
COLOR FLD: YELLOW
CREAT SERPL-MCNC: 0.59 MG/DL (ref 0.6–1.1)
EOSINOPHIL # BLD AUTO: 0 THOU/UL (ref 0–0.4)
EOSINOPHIL NFR BLD AUTO: 0 % (ref 0–6)
EOSINOPHIL NFR FLD MANUAL: 1 %
ERYTHROCYTE [DISTWIDTH] IN BLOOD BY AUTOMATED COUNT: 17.1 % (ref 11–14.5)
GFR SERPL CREATININE-BSD FRML MDRD: >60 ML/MIN/1.73M2
GLUCOSE BLD-MCNC: 103 MG/DL (ref 70–125)
HCT VFR BLD AUTO: 28.1 % (ref 35–47)
HGB BLD-MCNC: 9.3 G/DL (ref 12–16)
LYMPHOCYTES # BLD AUTO: 1.4 THOU/UL (ref 0.8–4.4)
LYMPHOCYTES NFR BLD AUTO: 12 % (ref 20–40)
LYMPHOCYTES NFR FLD MANUAL: 11 %
MCH RBC QN AUTO: 33.1 PG (ref 27–34)
MCHC RBC AUTO-ENTMCNC: 33.1 G/DL (ref 32–36)
MCV RBC AUTO: 100 FL (ref 80–100)
MONOCYTES # BLD AUTO: 0.7 THOU/UL (ref 0–0.9)
MONOCYTES NFR BLD AUTO: 6 % (ref 2–10)
MONOS+MACROS NFR FLD MANUAL: 48 %
NEUTROPHILS # BLD AUTO: 9.2 THOU/UL (ref 2–7.7)
NEUTROPHILS NFR BLD AUTO: 81 % (ref 50–70)
NEUTS BAND NFR FLD MANUAL: 40 %
OTHER CELLS FLD MANUAL: 0 %
PLATELET # BLD AUTO: 219 THOU/UL (ref 140–440)
PMV BLD AUTO: 11.2 FL (ref 8.5–12.5)
POTASSIUM BLD-SCNC: 3.4 MMOL/L (ref 3.5–5)
RBC # BLD AUTO: 2.81 MILL/UL (ref 3.8–5.4)
SODIUM SERPL-SCNC: 129 MMOL/L (ref 136–145)
SPECIMEN SOURCE FLD: NORMAL
WBC # FLD AUTO: NORMAL /UL
WBC: 11.6 THOU/UL (ref 4–11)

## 2018-06-19 NOTE — PROGRESS NOTES
Clinic Care Coordination Contact  Care Coordination Transition Communication    Referral Source: IP Handoff    Clinical Data: Patient was hospitalized at [facility]  from 6/12 to 6/18 with diagnosis of Hyponatremia.     Transition to Facility:[facility] [place] [facility] Phone: (Admissions: [phone] RN Report: [phone] Fax: [phone]) .               Plan: RN/SW Care Coordinator will send TCU handoff and await notification from facility staff informing RN/SW Care Coordinator of patient's discharge plans/needs. RN/SW Care Coordinator will review chart and outreach to facility staff every 4 weeks and as needed.     Sascha MATTHEWS,RN- BC  Clinic Care Coordinator  [facility]  Phone: [phone]

## 2018-06-20 ENCOUNTER — RECORDS - HEALTHEAST (OUTPATIENT)
Dept: LAB | Facility: CLINIC | Age: 63
End: 2018-06-20

## 2018-06-20 LAB
BACTERIA SPEC CULT: NORMAL
Lab: NORMAL
POTASSIUM BLD-SCNC: 4.3 MMOL/L (ref 3.5–5)
SODIUM SERPL-SCNC: 129 MMOL/L (ref 136–145)
SPECIMEN SOURCE: NORMAL

## 2018-06-21 ENCOUNTER — AMBULATORY - HEALTHEAST (OUTPATIENT)
Dept: GERIATRICS | Facility: CLINIC | Age: 63
End: 2018-06-21

## 2018-06-23 LAB
BACTERIA SPEC CULT: NO GROWTH
BACTERIA SPEC CULT: NO GROWTH
Lab: NORMAL
Lab: NORMAL
SPECIMEN SOURCE: NORMAL
SPECIMEN SOURCE: NORMAL

## 2018-06-30 ENCOUNTER — NURSE TRIAGE (OUTPATIENT)
Dept: NURSING | Facility: CLINIC | Age: 63
End: 2018-06-30

## 2018-06-30 ENCOUNTER — TELEPHONE (OUTPATIENT)
Dept: FAMILY MEDICINE | Facility: CLINIC | Age: 63
End: 2018-06-30

## 2018-06-30 DIAGNOSIS — E87.1 HYPONATREMIA: ICD-10-CM

## 2018-06-30 DIAGNOSIS — K74.60 CIRRHOSIS OF LIVER WITH ASCITES, UNSPECIFIED HEPATIC CIRRHOSIS TYPE (H): ICD-10-CM

## 2018-06-30 DIAGNOSIS — K70.30 ALCOHOLIC CIRRHOSIS OF LIVER WITHOUT ASCITES (H): ICD-10-CM

## 2018-06-30 DIAGNOSIS — R18.8 CIRRHOSIS OF LIVER WITH ASCITES, UNSPECIFIED HEPATIC CIRRHOSIS TYPE (H): ICD-10-CM

## 2018-06-30 NOTE — TELEPHONE ENCOUNTER
Clinic Action Needed: Yes call back  FNA Triage Call  Presenting Problem:    Savannah, Pharmacist from [facility], called reporting that on patient's discharge papers it shows that she needed to get a few medications but did not need a prescription for them. The pharmacist reports she does need a prescription and is requesting the prescriptions be faxed to [phone]. Patient needs the following prescriptions faxed:    folic acid (FOLVITE) 1 MG tablet 30 tablet  6/19/2018  No   Sig - Route: Take 1 tablet (1 mg) by mouth daily - Oral     furosemide (LASIX) 40 MG tablet 30 tablet  6/19/2018  No   Sig - Route: Take 1 tablet (40 mg) by mouth daily - Oral     spironolactone (ALDACTONE) 25 MG tablet 30 tablet  6/19/2018  No   Sig - Route: Take 1 tablet (25 mg) by mouth daily - Oral       Routed to:ML Arredondo RN/[facility]

## 2018-06-30 NOTE — TELEPHONE ENCOUNTER
Savannah, Pharmacist for [facility], called reporting that patient came in to get some medications that she thought were OTC according to her discharge papers from being in the hospital from 6/12/18-6/18/18 (see below). Pharmacist reports that those medications due need a prescription and are not OTC. Pharmacist requested patient's primary provider to fax over prescriptions for folic acid (see below), Furosemide (see below), and Spironolactone (see below). This writer reported she will route a message to Dr. Khalil's care team. Pharmacist agreed with plan.     You don't need a prescription for these medications       folic acid 1 MG tablet    furosemide 40 MG tablet    spironolactone 25 MG tablet         folic acid (FOLVITE) 1 MG tablet 30 tablet  6/19/2018  No   Sig - Route: Take 1 tablet (1 mg) by mouth daily - Oral     furosemide (LASIX) 40 MG tablet 30 tablet  6/19/2018  No   Sig - Route: Take 1 tablet (40 mg) by mouth daily - Oral     spironolactone (ALDACTONE) 25 MG tablet 30 tablet  6/19/2018  No   Sig - Route: Take 1 tablet (25 mg) by mouth daily - Oral     Radha Arredondo RN/[facility]

## 2018-07-02 RX ORDER — FOLIC ACID 1 MG/1
1 TABLET ORAL DAILY
Qty: 30 TABLET | Refills: 11 | Status: SHIPPED | OUTPATIENT
Start: 2018-07-02 | End: 2019-05-25

## 2018-07-02 RX ORDER — FUROSEMIDE 40 MG
40 TABLET ORAL DAILY
Qty: 30 TABLET | Refills: 11 | Status: SHIPPED | OUTPATIENT
Start: 2018-07-02 | End: 2019-06-22

## 2018-07-02 RX ORDER — SPIRONOLACTONE 25 MG/1
25 TABLET ORAL DAILY
Qty: 30 TABLET | Refills: 11 | Status: SHIPPED | OUTPATIENT
Start: 2018-07-02 | End: 2019-05-25

## 2018-07-02 NOTE — TELEPHONE ENCOUNTER
Routing refill request to provider for review/approval because:  Last signed by another provider.    Jayna Colunga RN

## 2018-07-02 NOTE — TELEPHONE ENCOUNTER
Please notify prescription sent to pharmacy for the three requested.   These were sent directly. .Tyler Khalil

## 2018-07-06 ENCOUNTER — OFFICE VISIT (OUTPATIENT)
Dept: FAMILY MEDICINE | Facility: CLINIC | Age: 63
End: 2018-07-06
Payer: MEDICARE

## 2018-07-06 VITALS
BODY MASS INDEX: 26.02 KG/M2 | HEART RATE: 100 BPM | DIASTOLIC BLOOD PRESSURE: 70 MMHG | OXYGEN SATURATION: 99 % | WEIGHT: 141.4 LBS | RESPIRATION RATE: 16 BRPM | HEIGHT: 62 IN | SYSTOLIC BLOOD PRESSURE: 112 MMHG | TEMPERATURE: 98.8 F

## 2018-07-06 DIAGNOSIS — K74.60 CIRRHOSIS OF LIVER WITH ASCITES, UNSPECIFIED HEPATIC CIRRHOSIS TYPE (H): Primary | ICD-10-CM

## 2018-07-06 DIAGNOSIS — R19.7 DIARRHEA, UNSPECIFIED TYPE: ICD-10-CM

## 2018-07-06 DIAGNOSIS — R18.8 CIRRHOSIS OF LIVER WITH ASCITES, UNSPECIFIED HEPATIC CIRRHOSIS TYPE (H): Primary | ICD-10-CM

## 2018-07-06 LAB
ALBUMIN SERPL-MCNC: 2.7 G/DL (ref 3.4–5)
ALP SERPL-CCNC: 181 U/L (ref 40–150)
ALT SERPL W P-5'-P-CCNC: 11 U/L (ref 0–50)
ANION GAP SERPL CALCULATED.3IONS-SCNC: 11 MMOL/L (ref 3–14)
AST SERPL W P-5'-P-CCNC: 37 U/L (ref 0–45)
BILIRUB SERPL-MCNC: 0.7 MG/DL (ref 0.2–1.3)
BUN SERPL-MCNC: 5 MG/DL (ref 7–30)
CALCIUM SERPL-MCNC: 8.7 MG/DL (ref 8.5–10.1)
CHLORIDE SERPL-SCNC: 99 MMOL/L (ref 94–109)
CO2 SERPL-SCNC: 22 MMOL/L (ref 20–32)
CREAT SERPL-MCNC: 0.47 MG/DL (ref 0.52–1.04)
GFR SERPL CREATININE-BSD FRML MDRD: >90 ML/MIN/1.7M2
GLUCOSE SERPL-MCNC: 102 MG/DL (ref 70–99)
POTASSIUM SERPL-SCNC: 4.1 MMOL/L (ref 3.4–5.3)
PROT SERPL-MCNC: 7.2 G/DL (ref 6.8–8.8)
SODIUM SERPL-SCNC: 132 MMOL/L (ref 133–144)

## 2018-07-06 PROCEDURE — 99214 OFFICE O/P EST MOD 30 MIN: CPT | Performed by: FAMILY MEDICINE

## 2018-07-06 PROCEDURE — 80053 COMPREHEN METABOLIC PANEL: CPT | Performed by: FAMILY MEDICINE

## 2018-07-06 PROCEDURE — 36415 COLL VENOUS BLD VENIPUNCTURE: CPT | Performed by: FAMILY MEDICINE

## 2018-07-06 ASSESSMENT — ANXIETY QUESTIONNAIRES
5. BEING SO RESTLESS THAT IT IS HARD TO SIT STILL: NOT AT ALL
7. FEELING AFRAID AS IF SOMETHING AWFUL MIGHT HAPPEN: NOT AT ALL
IF YOU CHECKED OFF ANY PROBLEMS ON THIS QUESTIONNAIRE, HOW DIFFICULT HAVE THESE PROBLEMS MADE IT FOR YOU TO DO YOUR WORK, TAKE CARE OF THINGS AT HOME, OR GET ALONG WITH OTHER PEOPLE: NOT DIFFICULT AT ALL
2. NOT BEING ABLE TO STOP OR CONTROL WORRYING: NOT AT ALL
GAD7 TOTAL SCORE: 0
6. BECOMING EASILY ANNOYED OR IRRITABLE: NOT AT ALL
1. FEELING NERVOUS, ANXIOUS, OR ON EDGE: NOT AT ALL
3. WORRYING TOO MUCH ABOUT DIFFERENT THINGS: NOT AT ALL

## 2018-07-06 ASSESSMENT — PATIENT HEALTH QUESTIONNAIRE - PHQ9: 5. POOR APPETITE OR OVEREATING: NOT AT ALL

## 2018-07-06 NOTE — PATIENT INSTRUCTIONS
(K74.60) Cirrhosis of liver with ascites, unspecified hepatic cirrhosis type (H)  (primary encounter diagnosis)  Comment: See the letter about the need for the OTC medications for vitamins and Melatonin.   Plan: Comprehensive metabolic panel        Do the labs today and they will be called with recommendations. Stay on the meds and the diet and hydration.   Walk daily. Monitor the weight and the goal would be to lose 1-2 lbs per month. Recheck in the clinic in August, and if doing well we will space out the visits.  Stay on the aftercare AA program    (R19.7) Diarrhea, unspecified type  Comment:   Plan: If the diarrhea returns then call the clinic RN at [phone] and the stool tests for parasites, and bacteria will be ordered.     For the lesions in the nose, consider Bacitracin ointment three times daily for a few days. This is OTC.

## 2018-07-06 NOTE — LETTER
[facility]  [address] [facility] [address]  Phone: [phone]    July 6, 2018      Karen Tee  [address] [place] [id number]      Dear Ms. Tee    For medical reasons, you need to take a general multivitamin daily and Melatonin.   Also you need Bacitracin ointment.     Sincerely,    / Tyler Khalil

## 2018-07-06 NOTE — MR AVS SNAPSHOT
After Visit Summary   7/6/2018    Karen Tee    MRN: [id number]           Patient Information     Date Of Birth          1955        Visit Information        Provider Department      7/6/2018 9:00 AM Tyler Khalil MD [facility]        Today's Diagnoses     Cirrhosis of liver with ascites, unspecified hepatic cirrhosis type (H)    -  1    Diarrhea, unspecified type          Care Instructions    (K74.60) Cirrhosis of liver with ascites, unspecified hepatic cirrhosis type (H)  (primary encounter diagnosis)  Comment: See the letter about the need for the OTC medications for vitamins and Melatonin.   Plan: Comprehensive metabolic panel        Do the labs today and they will be called with recommendations. Stay on the meds and the diet and hydration.   Walk daily. Monitor the weight and the goal would be to lose 1-2 lbs per month. Recheck in the clinic in August, and if doing well we will space out the visits.  Stay on the aftercare AA program    (R19.7) Diarrhea, unspecified type  Comment:   Plan: If the diarrhea returns then call the clinic RN at [phone] and the stool tests for parasites, and bacteria will be ordered.     For the lesions in the nose, consider Bacitracin ointment three times daily for a few days. This is OTC.           Follow-ups after your visit        Who to contact     If you have questions or need follow up information about today's clinic visit or your schedule please contact [facility] directly at [phone].  Normal or non-critical lab and imaging results will be communicated to you by MyChart, letter or phone within 4 business days after the clinic has received the results. If you do not hear from us within 7 days, please contact the clinic through MyChart or phone. If you have a critical or abnormal lab result, we will notify you by phone as soon as possible.  Submit refill requests through [employer] or call your pharmacy and they  "will forward the refill request to us. Please allow 3 business days for your refill to be completed.          Additional Information About Your Visit        Care EveryWhere ID     This is your Care EveryWhere ID. This could be used by other organizations to access your [facility] medical records  [id number]        Your Vitals Were     Pulse Temperature Respirations Height Last Period Pulse Oximetry    100 98.8  F (37.1  C) (Tympanic) 16 5' 1.5\" (1.562 m) 05/19/2007 99%    BMI (Body Mass Index)                   26.28 kg/m2            Blood Pressure from Last 3 Encounters:   07/06/18 112/70   06/18/18 95/58   06/11/18 109/59    Weight from Last 3 Encounters:   07/06/18 141 lb 6.4 oz (64.1 kg)   06/18/18 144 lb 10 oz (65.6 kg)   06/11/18 162 lb (73.5 kg)              We Performed the Following     Comprehensive metabolic panel        Primary Care Provider Office Phone # Fax #    Tyler Khalil [phone] [address] [facility] [address]        Equal Access to Services     DONNA KRISHNAN AH: Hadii philippe ku hadasho Soyris, waaxda luqadaha, qaybta kaalmada aderomarioyaludmila, kiki ouqendo . So [facility] [phone].    ATENCIÓN: Si habla español, tiene a joseph disposición servicios gratuitos de asistencia lingüística. Llame al [phone].    We comply with applicable federal civil rights laws and [place] laws. We do not discriminate on the basis of race, color, national origin, age, disability, sex, sexual orientation, or gender identity.            Thank you!     Thank you for choosing [facility]  for your care. Our goal is always to provide you with excellent care. Hearing back from our patients is one way we can continue to improve our services. Please take a few minutes to complete the written survey that you may receive in the mail after your visit with us. Thank you!             Your Updated Medication List - Protect others around you: Learn how to safely " use, store and throw away your medicines at www.disposemymeds.org.          This list is accurate as of 7/6/18  9:36 AM.  Always use your most recent med list.                   Brand Name Dispense Instructions for use Diagnosis    folic acid 1 MG tablet    FOLVITE    30 tablet    Take 1 tablet (1 mg) by mouth daily    Alcoholic cirrhosis of liver without ascites (H)       furosemide 40 MG tablet    LASIX    30 tablet    Take 1 tablet (40 mg) by mouth daily    Hyponatremia       spironolactone 25 MG tablet    ALDACTONE    30 tablet    Take 1 tablet (25 mg) by mouth daily    Cirrhosis of liver with ascites, unspecified hepatic cirrhosis type (H)

## 2018-07-06 NOTE — PROGRESS NOTES
SUBJECTIVE:   Karen Tee is a 62 year old female who presents to clinic today for the following health issues:          Hospital Follow-up Visit:    Hospital/Nursing Home/IP Rehab Facility: [facility]  Date of Admission: 6/12/2018  Date of Discharge: 6/18/2018  Reason(s) for Admission: cirrhosis of liver with ascites.             Problems taking medications regularly:  None       Medication changes since discharge: None       Problems adhering to non-medication therapy:  None  Summary of hospitalization:  [facility] discharge summary reviewed  Diagnostic Tests/Treatments reviewed.  Follow up needed: none  Other Healthcare Providers Involved in Patient s Care:         None  Update since discharge: improved.     Post Discharge Medication Reconciliation: discharge medications reconciled, continue medications without change.  Plan of care communicated with patient     Coding guidelines for this visit:  Type of Medical   Decision Making Face-to-Face Visit       within 7 Days of discharge Face-to-Face Visit        within 14 days of discharge   Moderate Complexity [id number]   High Complexity [id number]            Current Outpatient Prescriptions:      folic acid (FOLVITE) 1 MG tablet, Take 1 tablet (1 mg) by mouth daily, Disp: 30 tablet, Rfl: 11     furosemide (LASIX) 40 MG tablet, Take 1 tablet (40 mg) by mouth daily, Disp: 30 tablet, Rfl: 11     spironolactone (ALDACTONE) 25 MG tablet, Take 1 tablet (25 mg) by mouth daily, Disp: 30 tablet, Rfl: 11    Patient Active Problem List   Diagnosis     Chronic depressive personality disorder     Major depressive disorder, single episode, mild (H)     Elevated blood pressure reading without diagnosis of hypertension     Tobacco use disorder     Hyperlipidemia     Inflammatory disease of breast     Ascites     Portal hypertension (H)     HYPERLIPIDEMIA LDL GOAL <100     Alcohol dependence (H)     Cirrhosis of liver with ascites, unspecified hepatic  "cirrhosis type (H)     Hyponatremia     Abnormal LFTs     Elevated INR     Leukocytosis       Blood pressure 112/70, pulse 100, temperature 98.8  F (37.1  C), temperature source Tympanic, resp. rate 16, height 5' 1.5\" (1.562 m), weight 141 lb 6.4 oz (64.1 kg), last menstrual period 05/19/2007, SpO2 99 %.    Exam:  GENERAL APPEARANCE: healthy, alert and no distress  EYES: EOMI,  PERRL  NECK: no adenopathy, no asymmetry, masses, or scars and thyroid normal to palpation  RESP: lungs clear to auscultation - no rales, rhonchi or wheezes  CV: regular rates and rhythm, normal S1 S2, no S3 or S4 and no murmur, click or rub -  ABDOMEN: the abdomen is less distended and is not tender.   SKIN: no suspicious lesions or rashes  PSYCH: mentation appears normal and affect normal/bright      (K74.60) Cirrhosis of liver with ascites, unspecified hepatic cirrhosis type (H)  (primary encounter diagnosis)  Comment: See the letter about the need for the OTC medications for vitamins and Melatonin.   Plan: Comprehensive metabolic panel        Do the labs today and they will be called with recommendations. Stay on the meds and the diet and hydration.   Walk daily. Monitor the weight and the goal would be to lose 1-2 lbs per month. Recheck in the clinic in August, and if doing well we will space out the visits.  Stay on the aftercare AA program    (R19.7) Diarrhea, unspecified type  Comment:   Plan: If the diarrhea returns then call the clinic RN at [phone] and the stool tests for parasites, and bacteria will be ordered.     For the lesions in the nose, consider Bacitracin ointment three times daily for a few days. This is OTC.       Tyler Khalil              "

## 2018-07-06 NOTE — LETTER
July 6, 2018      Karen Tee  [address] [address]        Dear ,    We are writing to inform you of your test results. All of your labs that were abnormal are improving. Please follow my instructions from our visit.       Resulted Orders   Comprehensive metabolic panel   Result Value Ref Range    Sodium 132 (L) 133 - 144 mmol/L    Potassium 4.1 3.4 - 5.3 mmol/L    Chloride 99 94 - 109 mmol/L    Carbon Dioxide 22 20 - 32 mmol/L    Anion Gap 11 3 - 14 mmol/L    Glucose 102 (H) 70 - 99 mg/dL      Comment:      Fasting specimen    Urea Nitrogen 5 (L) 7 - 30 mg/dL    Creatinine 0.47 (L) 0.52 - 1.04 mg/dL    GFR Estimate >90 >60 mL/min/1.7m2      Comment:      Non  GFR Calc    GFR Estimate If Black >90 >60 mL/min/1.7m2      Comment:       GFR Calc    Calcium 8.7 8.5 - 10.1 mg/dL    Bilirubin Total 0.7 0.2 - 1.3 mg/dL    Albumin 2.7 (L) 3.4 - 5.0 g/dL    Protein Total 7.2 6.8 - 8.8 g/dL    Alkaline Phosphatase 181 (H) 40 - 150 U/L    ALT 11 0 - 50 U/L    AST 37 0 - 45 U/L       If you have any questions or concerns, please call the clinic at the number listed above.       Sincerely,        Tyler Khalil MD

## 2018-07-07 ASSESSMENT — PATIENT HEALTH QUESTIONNAIRE - PHQ9: SUM OF ALL RESPONSES TO PHQ QUESTIONS 1-9: 4

## 2018-07-07 ASSESSMENT — ANXIETY QUESTIONNAIRES: GAD7 TOTAL SCORE: 0

## 2018-07-13 ENCOUNTER — PATIENT OUTREACH (OUTPATIENT)
Dept: CARE COORDINATION | Facility: CLINIC | Age: 63
End: 2018-07-13

## 2018-07-13 ASSESSMENT — ACTIVITIES OF DAILY LIVING (ADL): DEPENDENT_IADLS:: INDEPENDENT

## 2018-07-13 NOTE — LETTER
Health Care Home - Access Care Plan  About Me  Patient Name:  Karen Tee  YOB: 1955  Age:                             62 year old   [place] MRN:            [id number] Telephone Information:     Home Phone [phone]   Mobile [phone]       Address:    [address]  [address] [address] Email address:  No e-mail address on record      Emergency Contact(s)  Name Relationship Lgl Grd Work Phone Home Phone Mobile Phone   1GO JACOB Mother   [phone]         My Access Plan  Medical Emergency 911   Questions or concerns during clinic hours Primary Clinic Line, I will call the clinic directly: [facility] - [phone]   24 Hour Appointment Line [phone] or  [phone]) (toll free)   24 Hour Nurse Line [phone] (toll free)   Questions or concerns outside clinic hours 24 Hour Appointment Line, I will call the after-hours on-call line:   [facility] [phone] or [phone]) (toll-free)   Preferred Urgent Care [facility] - [place], [phone]   Preferred [facility] [place], [place]  [phone]   Preferred Pharmacy [facility] [place] - [address] [facility]     Behavioral Health Crisis Line The National Suicide Prevention Lifeline at 1-340.836.1456 or 911                   My Care Team Members  Patient Care Team       Relationship Specialty Notifications Start End    Tyler Khalil MD PCP - General   9/24/04     Phone: [phone] Fax: [phone] [address] [facility] [address]    Sascha Marcum, RN Clinic Care Coordinator Primary Care - CC Admissions 6/18/18     Phone: [phone]                My Medical and Care Information  Problem List   Patient Active Problem List   Diagnosis     Chronic depressive personality disorder     Major depressive disorder, single episode, mild (H)     Elevated blood pressure  reading without diagnosis of hypertension     Tobacco use disorder     Hyperlipidemia     Inflammatory disease of breast     Ascites     Portal hypertension (H)     HYPERLIPIDEMIA LDL GOAL <100     Alcohol dependence (H)     Cirrhosis of liver with ascites, unspecified hepatic cirrhosis type (H)     Hyponatremia     Abnormal LFTs     Elevated INR     Leukocytosis      Current Medications and Allergies:  See printed Medication Report

## 2018-07-13 NOTE — LETTER
[facility]ORDIN[facility]  [address] [place] [address]        July 13, 2018      Karen Tee  [address]  [address] [address]    Dear Karen,    I am a clinic care coordinator who works with Dr. Khalil at the [facility]. I recently tried to call and was unable to reach you. I wanted to introduce myself and provide you with my contact information so that you can call me with questions or concerns about your health care. Below is a description of clinic care coordination and how I can further assist you.     The clinic care coordinator is a registered nurse and/or  who understand the health care system. The goal of clinic care coordination is to help you manage your health and improve access to the [facility] in the most efficient manner. The registered nurse can assist you in meeting your health care goals by providing education, coordinating services, and strengthening the communication among your providers. The  can assist you with financial, behavioral, psychosocial, chemical dependency, counseling, and/or psychiatric resources.    Please feel free to contact me at [phone], with any questions or concerns. We at [facility] are focused on providing you with the highest-quality healthcare experience possible and that all starts with you.     Sincerely,     Sascha MATTHEWS,RN- BC  Clinic Care Coordinator  [facility]  Phone: [phone]    Enclosed: I have enclosed a copy of a 24 Hour Access Plan. This has helpful phone numbers for you to call when needed. Please keep this in an easy to access place to use as needed. and I have enclosed helpful educational material. Please review and call me with any questions.

## 2018-07-13 NOTE — PROGRESS NOTES
Clinic Care Coordination Contact  [facility]/No Voicemail-just busy signal    Referral Source: IP Handoff  Clinical Data: Care Coordinator Outreach  Outreach attempted x 1.  Unable to leave message as phone just rings busy.  Plan: Care Coordinator will mail out care coordination introduction letter with care coordinator contact information and explanation of care coordination services. Care Coordinator will try to reach patient again in 1-2 business days.  Sascha MATTHEWS,RN- BC  Clinic Care Coordinator  [facility]  Phone: [phone]

## 2018-07-17 ASSESSMENT — ACTIVITIES OF DAILY LIVING (ADL): DEPENDENT_IADLS:: INDEPENDENT

## 2018-07-20 ASSESSMENT — ACTIVITIES OF DAILY LIVING (ADL): DEPENDENT_IADLS:: INDEPENDENT

## 2018-07-20 NOTE — PROGRESS NOTES
Clinic Care Coordination Contact  Clinic Care Coordination Contact  OUTREACH  Referral Information:  Referral Source: IP Handoff  Primary Diagnosis: GI Disorders (Hyponatremia R/T ETOH Cirrhosis)  Chief Complaint   Patient presents with     Clinic Care Coordination - Post Hospital     post hospital/TCU F/U   [place] Utilization:   Clinic Utilization  Difficulty keeping appointments: No  Utilization    Last refreshed: 7/17/2018 10:34 PM:  No Show Count (past year) 0       Last refreshed: 7/17/2018 10:34 PM:  ED visits 0       Last refreshed: 7/17/2018 10:34 PM:  Hospital admissions 1          Current as of: 7/17/2018 10:34 PM         Clinical Concerns:  Current Medical Concerns: Patient was hospitalized at [facility]  from 6/12 to 6/18 with diagnosis of Hyponatremia (ETOH Cirrhosis). Transitioned to Facility:[facility]. Patient was DC'd from [facility] 6/21(after 2 days there and no hand off received) AMA, She did follow up with PCP on 7/6/18.  Current Behavioral Concerns: HX ETOH use. Reports she is going to AA and not drinking (TCU provider stated 7 Oz jes/day)  Education Provided to patient: Role of Care Coordinator (aknowledged receiving my letter in the mail), importance of not drinking    Pain  Chronic pain (GOAL): No  Health Maintenance Reviewed: Due/Overdue    HIV SCREEN (SYSTEM ASSIGNED)  12/17/1975       COLON CANCER SCREEN (SYSTEM ASSIGNED)  12/17/2007       MAMMO SCREEN Q2 YR (SYSTEM ASSIGNED)  10/24/2009       ADVANCE DIRECTIVE PLANNING Q5 YRS  12/17/2012       TETANUS IMMUNIZATION (SYSTEM ASSIGNED)  2/19/2013       DEPRESSION ACTION PLAN Q1 YR  3/9/2013       PAP Q3 YR  3/9/2015  7/15/2017     LIPID SCREEN Q5 YR FEMALE (SYSTEM ASSIGNED)        Clinical Pathway: None    Medication Management:  Has been to PCP     Functional Status:  Dependent ADLs: Independent  Dependent IADLs: Independent  Bed or wheelchair confined: No  Mobility Status: Independent (no longer  using walker)    Living Situation:  Current living arrangement: I live in a private home, I live alone  Type of residence:: Apartment    Diet/Exercise/Sleep:  Inadequate nutrition (GOAL): No  Food Insecurity: No  Tube Feeding: No  Exercise:: Currently not exercising  Inadequate activity/exercise (GOAL): No  Significant changes in sleep pattern (GOAL): No    Transportation:  Transportation concerns (GOAL): No  Transportation means: Accessible car, Regular car (drives though admits to ETOH use in hospital)     Psychosocial:  Adventism or spiritual beliefs that impact treatment: No  Mental health DX: Yes  Mental health DX how managed: None  Mental health management concern (GOAL): No  Informal Support system: Family, Friends     Financial/Insurance:   Financial/Insurance concerns (GOAL):No     Resources and Interventions:  Current Resources:   Community Resources: [facility]  Supplies used at home:: None  Equipment Currently Used at Home: walker, rolling (not using walker since leaving hospital)    Advance Care Plan/Directive  Advanced Care Plans/Directives on file: No    Referrals Placed: None    Patient/Caregiver understanding: Patient reports that she is doing well since she left TCU. Reports she just had to get home to take care of bills and such. Reports she did not like it there as she always had to ring the bell when getting out of bed and when she had diarrhea she couldn't make it in time. Reports that she was steady with her walker at the time but they still had to be with her. Says that she is not using the walker any longer. She lives in a small apartment and is able to get around without difficulty. Reports that she lives alone but has many family and friends checking on her. Denies drinking. States she is attending AA. Acknowledged receiving materials sent to her. Can not identify specific needs today but would like f/u in 3-4 weeks    Plan: ML pandya will f/u  in 3-4 weeks    Sascha MATTHEWS,RN- BC  Clinic Care Coordinator  [facility]  Phone: [phone]

## 2018-08-14 ENCOUNTER — PATIENT OUTREACH (OUTPATIENT)
Dept: CARE COORDINATION | Facility: CLINIC | Age: 63
End: 2018-08-14

## 2018-08-14 ASSESSMENT — ACTIVITIES OF DAILY LIVING (ADL): DEPENDENT_IADLS:: INDEPENDENT

## 2018-08-14 NOTE — PROGRESS NOTES
Clinic Care Coordination Contact    Clinic Care Coordination Contact  OUTREACH  Referral Information:  Referral Source: IP Handoff  Primary Diagnosis: GI Disorders (Hyponatremia R/T ETOH Cirrhosis)  Chief Complaint   Patient presents with     Clinic Care Coordination - Follow-up     RN CC    [place] Utilization:   Clinic Utilization  Difficulty keeping appointments: No  Utilization    Last refreshed: 7/20/2018  3:20 PM:  No Show Count (past year) 0       Last refreshed: 7/20/2018  3:20 PM:  ED visits 0       Last refreshed: 7/20/2018  3:20 PM:  Hospital admissions 1          Current as of: 7/20/2018  3:20 PM         Clinical Concerns:  Current Medical Concerns: Patient was hospitalized at [facility]  from 6/12 to 6/18 with diagnosis of Hyponatremia (ETOH Cirrhosis). Transitioned to Facility:[facility]. Patient was DC'd from [facility] 6/21(after 2 days there and no hand off received) AMA, She did follow up with PCP on 7/6/18. Today reports her health is stable.  Current Behavioral Concerns: None  Education Provided to patient: Reiterated role of CC, importance of continues support from AA    Pain  Pain (GOAL): No  Health Maintenance Reviewed: Overdue  Topic Due Last Communication     HIV SCREEN (SYSTEM ASSIGNED)  12/17/1975       COLON CANCER SCREEN (SYSTEM ASSIGNED)  12/17/2007       MAMMO SCREEN Q2 YR (SYSTEM ASSIGNED)  10/24/2009       ADVANCE DIRECTIVE PLANNING Q5 YRS  12/17/2012       TETANUS IMMUNIZATION (SYSTEM ASSIGNED)  2/19/2013       DEPRESSION ACTION PLAN Q1 YR  3/9/2013       PAP Q3 YR  3/9/2015  7/15/2017     LIPID SCREEN Q5 YR FEMALE (SYSTEM ASSIGNED)  3/9/2017        Clinical Pathway: None    Medication Management:  No new meduications    Functional Status:  Dependent ADLs: Independent  Dependent IADLs: Independent  Bed or wheelchair confined: No  Mobility Status: Independent (no longer using walker)    Living Situation:  Current living arrangement: I live in  a private home, I live alone  Type of residence: Apartment    Diet/Exercise/Sleep:  Inadequate nutrition (GOAL): No  Food Insecurity: No  Tube Feeding: No  Exercise:: Currently not exercising  Inadequate activity/exercise (GOAL): No  Significant changes in sleep pattern (GOAL): No    Transportation:  Transportation concerns (GOAL): No  Transportation means:: Accessible car, Regular car (drives though admits to ETOH use in hospital)     Psychosocial:  Hoahaoism or spiritual beliefs that impact treatment: No  Mental health DX: Yes  Mental health DX how managed: None  Mental health management concern (GOAL): No  Informal Support system:: Family, Friends     Financial/Insurance:   Financial/Insurance concerns (GOAL): No       Resources and Interventions:  Current Resources:   Community Resources: ([facility]-DC'd AMA two days after admit)  Supplies used at home:: None  Equipment Currently Used at Home: (not using walker since leaving hospital)    Advance Care Plan/Directive  Advanced Care Plans/Directives on file: No  Referrals Placed: None    Patient/Caregiver understanding: Patient reports she continues to do well post hospital/TCU stay. She is getting out and about without walker. Is driving and able to care for her own needs. Reports she continues to attend AA meetings and that it is good support for her. Reports no drinking. Denied CC needs and needing further outreach.    Plan: No further outreaches planned at this time.    Sascha MATTHEWS,RN- BC  Clinic Care Coordinator  [facility]  Phone: [phone]

## 2018-12-10 ENCOUNTER — TELEPHONE (OUTPATIENT)
Dept: FAMILY MEDICINE | Facility: CLINIC | Age: 63
End: 2018-12-10

## 2018-12-10 NOTE — TELEPHONE ENCOUNTER
12/10/2018    Attempt 1    Contacted patient in regards to scheduling VIP mammogram  Message no voicemail     Patient is also due for -     Comments:       Outreach   Brigette Adams

## 2019-05-25 DIAGNOSIS — R18.8 CIRRHOSIS OF LIVER WITH ASCITES, UNSPECIFIED HEPATIC CIRRHOSIS TYPE (H): ICD-10-CM

## 2019-05-25 DIAGNOSIS — K70.30 ALCOHOLIC CIRRHOSIS OF LIVER WITHOUT ASCITES (H): ICD-10-CM

## 2019-05-25 DIAGNOSIS — K74.60 CIRRHOSIS OF LIVER WITH ASCITES, UNSPECIFIED HEPATIC CIRRHOSIS TYPE (H): ICD-10-CM

## 2019-05-28 NOTE — TELEPHONE ENCOUNTER
Routing refill request to provider for review/approval because:  Labs out of range:  See below    Jayna Colunga RN

## 2019-05-28 NOTE — TELEPHONE ENCOUNTER
"Requested Prescriptions   Pending Prescriptions Disp Refills     spironolactone (ALDACTONE) 25 MG tablet [Pharmacy Med Name: SPIRONOLACTONE 25 MG TABLET] 30 tablet 10     Sig: TAKE 1 TABLET BY MOUTH EVERY DAY   Last Written Prescription Date:  7/2/18  Last Fill Quantity: 30 tab,  # refills: 11   Last office visit: 7/6/2018 with prescribing provider:  Tyler Khalil Office Visit:        Diuretics (Including Combos) Protocol Failed - 5/25/2019 12:22 AM        Failed - Normal serum creatinine on file in past 12 months     Recent Labs   Lab Test 07/06/18  0945   CR 0.47*              Failed - Normal serum sodium on file in past 12 months     Recent Labs   Lab Test 07/06/18  0945   *              Passed - Blood pressure under 140/90 in past 12 months     BP Readings from Last 3 Encounters:   07/06/18 112/70   06/18/18 95/58   06/11/18 109/59                 Passed - Recent (12 mo) or future (30 days) visit within the authorizing provider's specialty     Patient had office visit in the last 12 months or has a visit in the next 30 days with authorizing provider or within the authorizing provider's specialty.  See \"Patient Info\" tab in inbasket, or \"Choose Columns\" in Meds & Orders section of the refill encounter.              Passed - Medication is active on med list        Passed - Patient is age 18 or older        Passed - No active pregancy on record        Passed - Normal serum potassium on file in past 12 months     Recent Labs   Lab Test 07/06/18  0945   POTASSIUM 4.1                    Passed - No positive pregnancy test in past 12 months        folic acid (FOLVITE) 1 MG tablet [Pharmacy Med Name: FOLIC ACID 1 MG TABLET] 30 tablet 10     Sig: TAKE 1 TABLET BY MOUTH EVERY DAY   Last Written Prescription Date:  7/2/18  Last Fill Quantity: 30 tab,  # refills: 11   Last office visit: 7/6/2018 with prescribing provider:  Tyler Khalil Office Visit:        Vitamin Supplements " "(Adult) Protocol Passed - 5/25/2019 12:22 AM        Passed - High dose Vitamin D not ordered        Passed - Recent (12 mo) or future (30 days) visit within the authorizing provider's specialty     Patient had office visit in the last 12 months or has a visit in the next 30 days with authorizing provider or within the authorizing provider's specialty.  See \"Patient Info\" tab in inbasket, or \"Choose Columns\" in Meds & Orders section of the refill encounter.              Passed - Medication is active on med list          "

## 2019-05-29 RX ORDER — FOLIC ACID 1 MG/1
TABLET ORAL
Qty: 30 TABLET | Refills: 10 | Status: SHIPPED | OUTPATIENT
Start: 2019-05-29 | End: 2020-01-01

## 2019-05-29 RX ORDER — SPIRONOLACTONE 25 MG/1
TABLET ORAL
Qty: 30 TABLET | Refills: 10 | Status: SHIPPED | OUTPATIENT
Start: 2019-05-29 | End: 2020-01-01

## 2019-06-22 DIAGNOSIS — E87.1 HYPONATREMIA: ICD-10-CM

## 2019-06-22 NOTE — LETTER
[facility] - [facility]  [address]  [facility] [address]  [phone]        June 24, 2019  Karen Tee  [address]    [facility] [address]    Dear Karen,    I care about your health and have reviewed your health plan. I have reviewed your medical conditions, medication list, and lab results and am making recommendations based on this review, to better manage your health.    You are in particular need of attention regarding:  -Wellness (Physical) Visit Your last Office Visit was 7/6/18.    I am recommending that you:  -schedule a WELLNESS (Physical) APPOINTMENT with me.   I will check fasting labs the same day - nothing to eat except water and meds for 8-10 hours prior.    Here is a list of Health Maintenance topics that are due now or due soon:  Health Maintenance Due   Topic Date Due     ADVANCE CARE PLANNING  1955     COLONOSCOPY  12/17/1967     HIV SCREENING  12/17/1972     ZOSTER IMMUNIZATION (1 of 2) 12/17/2007     MAMMO SCREENING  10/24/2009     DTAP/TDAP/TD IMMUNIZATION (2 - Td) 02/19/2013     PAP  03/09/2015     LIPID  03/09/2017     PHQ-9  01/06/2019       Please call us at [phone] (or use [employer]) to address the above recommendations.     Thank you for trusting [facility] and we appreciate the opportunity to serve you.  We look forward to supporting your healthcare needs in the future.    Healthy Regards,    Dr. Khalil/Bonita SWIFT RN

## 2019-06-24 RX ORDER — FUROSEMIDE 40 MG
TABLET ORAL
Qty: 30 TABLET | Refills: 0 | Status: SHIPPED | OUTPATIENT
Start: 2019-06-24 | End: 2019-07-27

## 2019-06-24 NOTE — TELEPHONE ENCOUNTER
"Requested Prescriptions   Pending Prescriptions Disp Refills     furosemide (LASIX) 40 MG tablet [Pharmacy Med Name: FUROSEMIDE 40 MG TABLET] 30 tablet 11     Sig: TAKE 1 TABLET BY MOUTH EVERY DAY   Last Written Prescription Date:  7/2/18  Last Fill Quantity: 30 tab,  # refills: 11   Last office visit: 7/6/2018 with prescribing provider:  Tyler Khalil     Future Office Visit:        Diuretics (Including Combos) Protocol Failed - 6/22/2019 12:19 AM        Failed - Normal serum creatinine on file in past 12 months     Recent Labs   Lab Test 07/06/18  0945   CR 0.47*              Failed - Normal serum sodium on file in past 12 months     Recent Labs   Lab Test 07/06/18  0945   *              Passed - Blood pressure under 140/90 in past 12 months     BP Readings from Last 3 Encounters:   07/06/18 112/70   06/18/18 95/58   06/11/18 109/59                 Passed - Recent (12 mo) or future (30 days) visit within the authorizing provider's specialty     Patient had office visit in the last 12 months or has a visit in the next 30 days with authorizing provider or within the authorizing provider's specialty.  See \"Patient Info\" tab in inbasket, or \"Choose Columns\" in Meds & Orders section of the refill encounter.              Passed - Medication is active on med list        Passed - Patient is age 18 or older        Passed - No active pregancy on record        Passed - Normal serum potassium on file in past 12 months     Recent Labs   Lab Test 07/06/18  0945   POTASSIUM 4.1                    Passed - No positive pregnancy test in past 12 months          "

## 2019-06-24 NOTE — TELEPHONE ENCOUNTER
Routing refill request to provider for review/approval because:  Labs out of range:  See below  Letter mailed to patient that she is due to be seen    Bonita SWIFT RN

## 2019-07-27 DIAGNOSIS — E87.1 HYPONATREMIA: ICD-10-CM

## 2019-07-29 NOTE — TELEPHONE ENCOUNTER
"Requested Prescriptions   Pending Prescriptions Disp Refills     furosemide (LASIX) 40 MG tablet [Pharmacy Med Name: FUROSEMIDE 40 MG TABLET] 30 tablet 0     Sig: TAKE 1 TABLET BY MOUTH EVERY DAY  Last Written Prescription Date:  6/24/2019  Last Fill Quantity: 30,  # refills: 0   Last office visit: 7/6/2018 with prescribing provider:  Remi   Future Office Visit:           Diuretics (Including Combos) Protocol Failed - 7/27/2019 11:33 AM        Failed - Blood pressure under 140/90 in past 12 months     BP Readings from Last 3 Encounters:   07/06/18 112/70   06/18/18 95/58   06/11/18 109/59                 Failed - Recent (12 mo) or future (30 days) visit within the authorizing provider's specialty     Patient had office visit in the last 12 months or has a visit in the next 30 days with authorizing provider or within the authorizing provider's specialty.  See \"Patient Info\" tab in inbasket, or \"Choose Columns\" in Meds & Orders section of the refill encounter.              Failed - Normal serum creatinine on file in past 12 months     Recent Labs   Lab Test 07/06/18  0945   CR 0.47*              Failed - Normal serum potassium on file in past 12 months     Recent Labs   Lab Test 07/06/18  0945   POTASSIUM 4.1                    Failed - Normal serum sodium on file in past 12 months     Recent Labs   Lab Test 07/06/18  0945   *              Passed - Medication is active on med list        Passed - Patient is age 18 or older        Passed - No active pregancy on record        Passed - No positive pregnancy test in past 12 months          "

## 2019-07-30 NOTE — TELEPHONE ENCOUNTER
Routing refill request to provider for review/approval because:  Nida given x1 and patient did not follow up, please advise     NAREN LealN, RN

## 2019-07-31 RX ORDER — FUROSEMIDE 40 MG
TABLET ORAL
Qty: 15 TABLET | Refills: 0 | Status: SHIPPED | OUTPATIENT
Start: 2019-07-31 | End: 2020-01-01

## 2019-07-31 NOTE — TELEPHONE ENCOUNTER
Covering for Dr. Khalil    Refilled for 2 weeks, appointment for future refills.       LEX Munguia CNP

## 2019-07-31 NOTE — TELEPHONE ENCOUNTER
Pt returned call and was given message. She needs appointment for further refills. She understood and will  the 2 week supply.

## 2019-07-31 NOTE — TELEPHONE ENCOUNTER
"\"the person you are trying to reach has a voicemail box that has not been set up yet . Please try your call again later. \"    Lynne GOLDEN    "

## 2020-01-01 ENCOUNTER — OFFICE VISIT (OUTPATIENT)
Dept: OTOLARYNGOLOGY | Facility: CLINIC | Age: 65
End: 2020-01-01
Attending: NURSE PRACTITIONER
Payer: MEDICARE

## 2020-01-01 ENCOUNTER — ANESTHESIA EVENT (OUTPATIENT)
Dept: REHABILITATION | Facility: SKILLED NURSING FACILITY | Age: 65
End: 2020-01-01
Payer: MEDICARE

## 2020-01-01 ENCOUNTER — APPOINTMENT (OUTPATIENT)
Dept: SPEECH THERAPY | Facility: CLINIC | Age: 65
DRG: 011 | End: 2020-01-01
Attending: OTOLARYNGOLOGY
Payer: MEDICARE

## 2020-01-01 ENCOUNTER — APPOINTMENT (OUTPATIENT)
Dept: GENERAL RADIOLOGY | Facility: CLINIC | Age: 65
DRG: 011 | End: 2020-01-01
Attending: OTOLARYNGOLOGY
Payer: MEDICARE

## 2020-01-01 ENCOUNTER — APPOINTMENT (OUTPATIENT)
Dept: GENERAL RADIOLOGY | Facility: CLINIC | Age: 65
End: 2020-01-01
Attending: NURSE PRACTITIONER
Payer: MEDICARE

## 2020-01-01 ENCOUNTER — OFFICE VISIT (OUTPATIENT)
Dept: ORTHOPEDICS | Facility: CLINIC | Age: 65
End: 2020-01-01
Payer: MEDICARE

## 2020-01-01 ENCOUNTER — ANCILLARY PROCEDURE (OUTPATIENT)
Dept: GENERAL RADIOLOGY | Facility: CLINIC | Age: 65
End: 2020-01-01
Attending: FAMILY MEDICINE
Payer: MEDICARE

## 2020-01-01 ENCOUNTER — TELEPHONE (OUTPATIENT)
Dept: EMERGENCY MEDICINE | Facility: CLINIC | Age: 65
End: 2020-01-01

## 2020-01-01 ENCOUNTER — APPOINTMENT (OUTPATIENT)
Dept: OCCUPATIONAL THERAPY | Facility: CLINIC | Age: 65
DRG: 011 | End: 2020-01-01
Attending: OTOLARYNGOLOGY
Payer: MEDICARE

## 2020-01-01 ENCOUNTER — HOSPITAL ENCOUNTER (INPATIENT)
Facility: SKILLED NURSING FACILITY | Age: 65
LOS: 1 days | Discharge: ANOTHER HOSPITAL | DRG: 146 | End: 2020-12-09
Attending: INTERNAL MEDICINE | Admitting: INTERNAL MEDICINE
Payer: MEDICARE

## 2020-01-01 ENCOUNTER — HOSPITAL ENCOUNTER (EMERGENCY)
Facility: CLINIC | Age: 65
Discharge: HOME | End: 2020-02-10
Attending: PHYSICIAN ASSISTANT | Admitting: PHYSICIAN ASSISTANT
Payer: MEDICARE

## 2020-01-01 ENCOUNTER — HOSPITAL ENCOUNTER (INPATIENT)
Facility: CLINIC | Age: 65
LOS: 6 days | Discharge: NURSING, REHAB OR LONG-TERM CARE | DRG: 011 | End: 2020-12-09
Attending: OTOLARYNGOLOGY | Admitting: INTERNAL MEDICINE
Payer: MEDICARE

## 2020-01-01 ENCOUNTER — APPOINTMENT (OUTPATIENT)
Dept: GENERAL RADIOLOGY | Facility: CLINIC | Age: 65
DRG: 011 | End: 2020-01-01
Attending: FAMILY MEDICINE
Payer: MEDICARE

## 2020-01-01 ENCOUNTER — OFFICE VISIT (OUTPATIENT)
Dept: AUDIOLOGY | Facility: CLINIC | Age: 65
End: 2020-01-01
Payer: MEDICARE

## 2020-01-01 ENCOUNTER — VIRTUAL VISIT (OUTPATIENT)
Dept: FAMILY MEDICINE | Facility: CLINIC | Age: 65
End: 2020-01-01
Payer: MEDICARE

## 2020-01-01 ENCOUNTER — HOSPITAL ENCOUNTER (OUTPATIENT)
Dept: CT IMAGING | Facility: CLINIC | Age: 65
DRG: 011 | End: 2020-12-03
Attending: OTOLARYNGOLOGY
Payer: MEDICARE

## 2020-01-01 ENCOUNTER — APPOINTMENT (OUTPATIENT)
Dept: GENERAL RADIOLOGY | Facility: CLINIC | Age: 65
End: 2020-01-01
Attending: PHYSICIAN ASSISTANT
Payer: MEDICARE

## 2020-01-01 ENCOUNTER — ANESTHESIA EVENT (OUTPATIENT)
Dept: SURGERY | Facility: CLINIC | Age: 65
DRG: 011 | End: 2020-01-01
Payer: MEDICARE

## 2020-01-01 ENCOUNTER — HOSPITAL ENCOUNTER (EMERGENCY)
Facility: CLINIC | Age: 65
Discharge: HOME | End: 2020-11-04
Attending: PHYSICIAN ASSISTANT | Admitting: PHYSICIAN ASSISTANT
Payer: MEDICARE

## 2020-01-01 ENCOUNTER — HOSPITAL ENCOUNTER (EMERGENCY)
Facility: CLINIC | Age: 65
End: 2020-12-09
Attending: EMERGENCY MEDICINE
Payer: MEDICARE

## 2020-01-01 ENCOUNTER — APPOINTMENT (OUTPATIENT)
Dept: PHYSICAL THERAPY | Facility: CLINIC | Age: 65
DRG: 011 | End: 2020-01-01
Attending: OTOLARYNGOLOGY
Payer: MEDICARE

## 2020-01-01 ENCOUNTER — ANESTHESIA (OUTPATIENT)
Dept: SURGERY | Facility: CLINIC | Age: 65
DRG: 011 | End: 2020-01-01
Payer: MEDICARE

## 2020-01-01 VITALS
DIASTOLIC BLOOD PRESSURE: 76 MMHG | OXYGEN SATURATION: 96 % | BODY MASS INDEX: 27.14 KG/M2 | TEMPERATURE: 96.6 F | HEIGHT: 63 IN | HEART RATE: 99 BPM | WEIGHT: 153.2 LBS | RESPIRATION RATE: 20 BRPM | SYSTOLIC BLOOD PRESSURE: 129 MMHG

## 2020-01-01 VITALS
HEART RATE: 115 BPM | DIASTOLIC BLOOD PRESSURE: 80 MMHG | WEIGHT: 145 LBS | BODY MASS INDEX: 25.69 KG/M2 | HEIGHT: 63 IN | TEMPERATURE: 99.1 F | OXYGEN SATURATION: 96 % | RESPIRATION RATE: 12 BRPM | SYSTOLIC BLOOD PRESSURE: 119 MMHG

## 2020-01-01 VITALS
HEART RATE: 117 BPM | OXYGEN SATURATION: 95 % | TEMPERATURE: 98.7 F | RESPIRATION RATE: 18 BRPM | DIASTOLIC BLOOD PRESSURE: 87 MMHG | SYSTOLIC BLOOD PRESSURE: 102 MMHG

## 2020-01-01 VITALS — TEMPERATURE: 99.4 F | HEART RATE: 104 BPM | OXYGEN SATURATION: 97 %

## 2020-01-01 VITALS
SYSTOLIC BLOOD PRESSURE: 179 MMHG | DIASTOLIC BLOOD PRESSURE: 88 MMHG | WEIGHT: 142 LBS | HEIGHT: 63 IN | BODY MASS INDEX: 25.16 KG/M2

## 2020-01-01 VITALS
HEIGHT: 63 IN | OXYGEN SATURATION: 99 % | TEMPERATURE: 98.8 F | WEIGHT: 154.32 LBS | HEART RATE: 93 BPM | BODY MASS INDEX: 27.34 KG/M2 | SYSTOLIC BLOOD PRESSURE: 158 MMHG | DIASTOLIC BLOOD PRESSURE: 70 MMHG | RESPIRATION RATE: 21 BRPM

## 2020-01-01 VITALS
SYSTOLIC BLOOD PRESSURE: 144 MMHG | BODY MASS INDEX: 25.34 KG/M2 | DIASTOLIC BLOOD PRESSURE: 77 MMHG | WEIGHT: 143 LBS | HEIGHT: 63 IN

## 2020-01-01 VITALS — DIASTOLIC BLOOD PRESSURE: 20 MMHG | SYSTOLIC BLOOD PRESSURE: 94 MMHG

## 2020-01-01 DIAGNOSIS — Z87.891 PERSONAL HISTORY OF TOBACCO USE, PRESENTING HAZARDS TO HEALTH: ICD-10-CM

## 2020-01-01 DIAGNOSIS — C32.9 LARYNGEAL SQUAMOUS CELL CARCINOMA (H): Primary | ICD-10-CM

## 2020-01-01 DIAGNOSIS — S89.92XD INJURY OF LEFT LOWER LEG, SUBSEQUENT ENCOUNTER: ICD-10-CM

## 2020-01-01 DIAGNOSIS — R22.1 NECK MASS: ICD-10-CM

## 2020-01-01 DIAGNOSIS — R06.2 WHEEZING: Primary | ICD-10-CM

## 2020-01-01 DIAGNOSIS — M25.572 ACUTE LEFT ANKLE PAIN: ICD-10-CM

## 2020-01-01 DIAGNOSIS — J38.7 LARYNGEAL MASS: Primary | ICD-10-CM

## 2020-01-01 DIAGNOSIS — J18.9 PNEUMONIA DUE TO INFECTIOUS ORGANISM, UNSPECIFIED LATERALITY, UNSPECIFIED PART OF LUNG: ICD-10-CM

## 2020-01-01 DIAGNOSIS — J38.7 LARYNGEAL MASS: ICD-10-CM

## 2020-01-01 DIAGNOSIS — C32.0 MALIGNANT NEOPLASM OF GLOTTIS (H): ICD-10-CM

## 2020-01-01 DIAGNOSIS — R06.02 SHORTNESS OF BREATH: ICD-10-CM

## 2020-01-01 DIAGNOSIS — K70.30 ALCOHOLIC CIRRHOSIS OF LIVER WITHOUT ASCITES (H): ICD-10-CM

## 2020-01-01 DIAGNOSIS — K59.00 CONSTIPATION, UNSPECIFIED CONSTIPATION TYPE: ICD-10-CM

## 2020-01-01 DIAGNOSIS — F17.210 TOBACCO DEPENDENCE DUE TO CIGARETTES: ICD-10-CM

## 2020-01-01 DIAGNOSIS — Z20.822 ENCOUNTER FOR LABORATORY TESTING FOR COVID-19 VIRUS: ICD-10-CM

## 2020-01-01 DIAGNOSIS — K74.60 CIRRHOSIS OF LIVER WITH ASCITES, UNSPECIFIED HEPATIC CIRRHOSIS TYPE (H): ICD-10-CM

## 2020-01-01 DIAGNOSIS — S93.492D HIGH ANKLE SPRAIN OF LEFT LOWER EXTREMITY, SUBSEQUENT ENCOUNTER: ICD-10-CM

## 2020-01-01 DIAGNOSIS — S82.832D OTHER CLOSED FRACTURE OF PROXIMAL END OF LEFT FIBULA WITH ROUTINE HEALING, SUBSEQUENT ENCOUNTER: ICD-10-CM

## 2020-01-01 DIAGNOSIS — R18.8 CIRRHOSIS OF LIVER WITH ASCITES, UNSPECIFIED HEPATIC CIRRHOSIS TYPE (H): ICD-10-CM

## 2020-01-01 DIAGNOSIS — R06.2 WHEEZING: ICD-10-CM

## 2020-01-01 DIAGNOSIS — Z43.0 TRACHEOSTOMY CARE (H): Primary | ICD-10-CM

## 2020-01-01 DIAGNOSIS — S82.832D OTHER CLOSED FRACTURE OF PROXIMAL END OF LEFT FIBULA WITH ROUTINE HEALING, SUBSEQUENT ENCOUNTER: Primary | ICD-10-CM

## 2020-01-01 DIAGNOSIS — H92.03 OTALGIA OF BOTH EARS: Primary | ICD-10-CM

## 2020-01-01 DIAGNOSIS — S82.839A CLOSED FRACTURE OF PROXIMAL FIBULA: ICD-10-CM

## 2020-01-01 DIAGNOSIS — I46.9 CARDIAC ARREST (H): ICD-10-CM

## 2020-01-01 DIAGNOSIS — I46.9 CARDIOPULMONARY ARREST (H): ICD-10-CM

## 2020-01-01 DIAGNOSIS — J20.9 ACUTE BRONCHITIS WITH SYMPTOMS > 10 DAYS: ICD-10-CM

## 2020-01-01 DIAGNOSIS — H92.03 OTALGIA, BILATERAL: ICD-10-CM

## 2020-01-01 LAB
ALBUMIN SERPL-MCNC: 1.9 G/DL (ref 3.4–5)
ALBUMIN SERPL-MCNC: 2.5 G/DL (ref 3.4–5)
ALBUMIN SERPL-MCNC: 2.6 G/DL (ref 3.4–5)
ALBUMIN SERPL-MCNC: 2.6 G/DL (ref 3.4–5)
ALBUMIN SERPL-MCNC: 2.7 G/DL (ref 3.4–5)
ALBUMIN SERPL-MCNC: 2.8 G/DL (ref 3.4–5)
ALP SERPL-CCNC: 116 U/L (ref 40–150)
ALP SERPL-CCNC: 118 U/L (ref 40–150)
ALP SERPL-CCNC: 119 U/L (ref 40–150)
ALP SERPL-CCNC: 140 U/L (ref 40–150)
ALP SERPL-CCNC: 241 U/L (ref 40–150)
ALP SERPL-CCNC: 98 U/L (ref 40–150)
ALT SERPL W P-5'-P-CCNC: 14 U/L (ref 0–50)
ALT SERPL W P-5'-P-CCNC: 15 U/L (ref 0–50)
ALT SERPL W P-5'-P-CCNC: 15 U/L (ref 0–50)
ALT SERPL W P-5'-P-CCNC: 16 U/L (ref 0–50)
ALT SERPL W P-5'-P-CCNC: 16 U/L (ref 0–50)
ALT SERPL W P-5'-P-CCNC: 23 U/L (ref 0–50)
AMMONIA PLAS-SCNC: 10 UMOL/L (ref 10–50)
ANION GAP SERPL CALCULATED.3IONS-SCNC: 12 MMOL/L (ref 3–14)
ANION GAP SERPL CALCULATED.3IONS-SCNC: 14 MMOL/L (ref 3–14)
ANION GAP SERPL CALCULATED.3IONS-SCNC: 4 MMOL/L (ref 3–14)
ANION GAP SERPL CALCULATED.3IONS-SCNC: 6 MMOL/L (ref 3–14)
ANION GAP SERPL CALCULATED.3IONS-SCNC: 7 MMOL/L (ref 3–14)
ANION GAP SERPL CALCULATED.3IONS-SCNC: 9 MMOL/L (ref 3–14)
APTT PPP: 37 SEC (ref 22–37)
AST SERPL W P-5'-P-CCNC: 15 U/L (ref 0–45)
AST SERPL W P-5'-P-CCNC: 19 U/L (ref 0–45)
AST SERPL W P-5'-P-CCNC: 20 U/L (ref 0–45)
AST SERPL W P-5'-P-CCNC: 225 U/L (ref 0–45)
AST SERPL W P-5'-P-CCNC: 24 U/L (ref 0–45)
AST SERPL W P-5'-P-CCNC: 28 U/L (ref 0–45)
BASOPHILS # BLD AUTO: 0.1 10E9/L (ref 0–0.2)
BASOPHILS # BLD AUTO: 0.1 10E9/L (ref 0–0.2)
BASOPHILS # BLD AUTO: 0.2 10E9/L (ref 0–0.2)
BASOPHILS NFR BLD AUTO: 0.5 %
BASOPHILS NFR BLD AUTO: 1 %
BASOPHILS NFR BLD AUTO: 1.2 %
BILIRUB SERPL-MCNC: 0.2 MG/DL (ref 0.2–1.3)
BILIRUB SERPL-MCNC: 0.4 MG/DL (ref 0.2–1.3)
BILIRUB SERPL-MCNC: 0.4 MG/DL (ref 0.2–1.3)
BILIRUB SERPL-MCNC: 0.5 MG/DL (ref 0.2–1.3)
BILIRUB SERPL-MCNC: 0.7 MG/DL (ref 0.2–1.3)
BILIRUB SERPL-MCNC: 0.7 MG/DL (ref 0.2–1.3)
BUN SERPL-MCNC: 10 MG/DL (ref 7–30)
BUN SERPL-MCNC: 11 MG/DL (ref 7–30)
BUN SERPL-MCNC: 5 MG/DL (ref 7–30)
BUN SERPL-MCNC: 6 MG/DL (ref 7–30)
BUN SERPL-MCNC: 7 MG/DL (ref 7–30)
BUN SERPL-MCNC: 7 MG/DL (ref 7–30)
BUN SERPL-MCNC: 8 MG/DL (ref 7–30)
BUN SERPL-MCNC: 9 MG/DL (ref 7–30)
CALCIUM SERPL-MCNC: 11.8 MG/DL (ref 8.5–10.1)
CALCIUM SERPL-MCNC: 8.4 MG/DL (ref 8.5–10.1)
CALCIUM SERPL-MCNC: 8.6 MG/DL (ref 8.5–10.1)
CALCIUM SERPL-MCNC: 8.7 MG/DL (ref 8.5–10.1)
CALCIUM SERPL-MCNC: 8.8 MG/DL (ref 8.5–10.1)
CALCIUM SERPL-MCNC: 8.9 MG/DL (ref 8.5–10.1)
CALCIUM SERPL-MCNC: 9.1 MG/DL (ref 8.5–10.1)
CALCIUM SERPL-MCNC: 9.3 MG/DL (ref 8.5–10.1)
CHLORIDE SERPL-SCNC: 101 MMOL/L (ref 94–109)
CHLORIDE SERPL-SCNC: 103 MMOL/L (ref 94–109)
CHLORIDE SERPL-SCNC: 105 MMOL/L (ref 94–109)
CHLORIDE SERPL-SCNC: 106 MMOL/L (ref 94–109)
CHLORIDE SERPL-SCNC: 98 MMOL/L (ref 94–109)
CHLORIDE SERPL-SCNC: 98 MMOL/L (ref 94–109)
CO2 SERPL-SCNC: 20 MMOL/L (ref 20–32)
CO2 SERPL-SCNC: 20 MMOL/L (ref 20–32)
CO2 SERPL-SCNC: 25 MMOL/L (ref 20–32)
CO2 SERPL-SCNC: 26 MMOL/L (ref 20–32)
CO2 SERPL-SCNC: 30 MMOL/L (ref 20–32)
CO2 SERPL-SCNC: 31 MMOL/L (ref 20–32)
COPATH REPORT: NORMAL
CREAT SERPL-MCNC: 0.44 MG/DL (ref 0.52–1.04)
CREAT SERPL-MCNC: 0.46 MG/DL (ref 0.52–1.04)
CREAT SERPL-MCNC: 0.47 MG/DL (ref 0.52–1.04)
CREAT SERPL-MCNC: 0.47 MG/DL (ref 0.52–1.04)
CREAT SERPL-MCNC: 0.48 MG/DL (ref 0.52–1.04)
CREAT SERPL-MCNC: 0.49 MG/DL (ref 0.52–1.04)
CREAT SERPL-MCNC: 0.55 MG/DL (ref 0.52–1.04)
CREAT SERPL-MCNC: 0.62 MG/DL (ref 0.52–1.04)
CREAT SERPL-MCNC: 0.64 MG/DL (ref 0.52–1.04)
DIFFERENTIAL METHOD BLD: ABNORMAL
EOSINOPHIL # BLD AUTO: 0 10E9/L (ref 0–0.7)
EOSINOPHIL # BLD AUTO: 0 10E9/L (ref 0–0.7)
EOSINOPHIL # BLD AUTO: 0.1 10E9/L (ref 0–0.7)
EOSINOPHIL # BLD AUTO: 0.2 10E9/L (ref 0–0.7)
EOSINOPHIL NFR BLD AUTO: 0 %
EOSINOPHIL NFR BLD AUTO: 0.1 %
EOSINOPHIL NFR BLD AUTO: 1.2 %
EOSINOPHIL NFR BLD AUTO: 2 %
ERYTHROCYTE [DISTWIDTH] IN BLOOD BY AUTOMATED COUNT: 13.9 % (ref 10–15)
ERYTHROCYTE [DISTWIDTH] IN BLOOD BY AUTOMATED COUNT: 14.4 % (ref 10–15)
ERYTHROCYTE [DISTWIDTH] IN BLOOD BY AUTOMATED COUNT: 14.8 % (ref 10–15)
ERYTHROCYTE [DISTWIDTH] IN BLOOD BY AUTOMATED COUNT: 15 % (ref 10–15)
ERYTHROCYTE [DISTWIDTH] IN BLOOD BY AUTOMATED COUNT: 15 % (ref 10–15)
ERYTHROCYTE [DISTWIDTH] IN BLOOD BY AUTOMATED COUNT: 15.1 % (ref 10–15)
ERYTHROCYTE [DISTWIDTH] IN BLOOD BY AUTOMATED COUNT: 15.3 % (ref 10–15)
ERYTHROCYTE [DISTWIDTH] IN BLOOD BY AUTOMATED COUNT: 15.3 % (ref 10–15)
GFR SERPL CREATININE-BSD FRML MDRD: >90 ML/MIN/{1.73_M2}
GLUCOSE BLDC GLUCOMTR-MCNC: 101 MG/DL (ref 70–99)
GLUCOSE BLDC GLUCOMTR-MCNC: 101 MG/DL (ref 70–99)
GLUCOSE BLDC GLUCOMTR-MCNC: 103 MG/DL (ref 70–99)
GLUCOSE BLDC GLUCOMTR-MCNC: 104 MG/DL (ref 70–99)
GLUCOSE BLDC GLUCOMTR-MCNC: 104 MG/DL (ref 70–99)
GLUCOSE BLDC GLUCOMTR-MCNC: 105 MG/DL (ref 70–99)
GLUCOSE BLDC GLUCOMTR-MCNC: 112 MG/DL (ref 70–99)
GLUCOSE BLDC GLUCOMTR-MCNC: 118 MG/DL (ref 70–99)
GLUCOSE BLDC GLUCOMTR-MCNC: 122 MG/DL (ref 70–99)
GLUCOSE BLDC GLUCOMTR-MCNC: 125 MG/DL (ref 70–99)
GLUCOSE BLDC GLUCOMTR-MCNC: 143 MG/DL (ref 70–99)
GLUCOSE BLDC GLUCOMTR-MCNC: 145 MG/DL (ref 70–99)
GLUCOSE BLDC GLUCOMTR-MCNC: 151 MG/DL (ref 70–99)
GLUCOSE BLDC GLUCOMTR-MCNC: 86 MG/DL (ref 70–99)
GLUCOSE BLDC GLUCOMTR-MCNC: 90 MG/DL (ref 70–99)
GLUCOSE BLDC GLUCOMTR-MCNC: 93 MG/DL (ref 70–99)
GLUCOSE BLDC GLUCOMTR-MCNC: 99 MG/DL (ref 70–99)
GLUCOSE SERPL-MCNC: 103 MG/DL (ref 70–99)
GLUCOSE SERPL-MCNC: 107 MG/DL (ref 70–99)
GLUCOSE SERPL-MCNC: 115 MG/DL (ref 70–99)
GLUCOSE SERPL-MCNC: 115 MG/DL (ref 70–99)
GLUCOSE SERPL-MCNC: 117 MG/DL (ref 70–99)
GLUCOSE SERPL-MCNC: 122 MG/DL (ref 70–99)
GLUCOSE SERPL-MCNC: 185 MG/DL (ref 70–99)
GLUCOSE SERPL-MCNC: 84 MG/DL (ref 70–99)
HBA1C MFR BLD: 5 % (ref 0–5.6)
HCT VFR BLD AUTO: 32.2 % (ref 35–47)
HCT VFR BLD AUTO: 33.6 % (ref 35–47)
HCT VFR BLD AUTO: 33.8 % (ref 35–47)
HCT VFR BLD AUTO: 34.7 % (ref 35–47)
HCT VFR BLD AUTO: 35 % (ref 35–47)
HCT VFR BLD AUTO: 35.5 % (ref 35–47)
HCT VFR BLD AUTO: 39.2 % (ref 35–47)
HCT VFR BLD AUTO: 39.8 % (ref 35–47)
HGB BLD-MCNC: 11.4 G/DL (ref 11.7–15.7)
HGB BLD-MCNC: 11.4 G/DL (ref 11.7–15.7)
HGB BLD-MCNC: 11.8 G/DL (ref 11.7–15.7)
HGB BLD-MCNC: 11.8 G/DL (ref 11.7–15.7)
HGB BLD-MCNC: 12.1 G/DL (ref 11.7–15.7)
HGB BLD-MCNC: 13.3 G/DL (ref 11.7–15.7)
HGB BLD-MCNC: 13.6 G/DL (ref 11.7–15.7)
HGB BLD-MCNC: 9.3 G/DL (ref 11.7–15.7)
IMM GRANULOCYTES # BLD: 0 10E9/L (ref 0–0.4)
IMM GRANULOCYTES # BLD: 0.1 10E9/L (ref 0–0.4)
IMM GRANULOCYTES # BLD: 0.1 10E9/L (ref 0–0.4)
IMM GRANULOCYTES NFR BLD: 0.3 %
IMM GRANULOCYTES NFR BLD: 0.4 %
IMM GRANULOCYTES NFR BLD: 0.5 %
INR PPP: 1.14 (ref 0.86–1.14)
INR PPP: 1.34 (ref 0.86–1.14)
LABORATORY COMMENT REPORT: NORMAL
LABORATORY COMMENT REPORT: NORMAL
LYMPHOCYTES # BLD AUTO: 0.7 10E9/L (ref 0.8–5.3)
LYMPHOCYTES # BLD AUTO: 1.8 10E9/L (ref 0.8–5.3)
LYMPHOCYTES # BLD AUTO: 3.2 10E9/L (ref 0.8–5.3)
LYMPHOCYTES # BLD AUTO: 4.1 10E9/L (ref 0.8–5.3)
LYMPHOCYTES NFR BLD AUTO: 14.7 %
LYMPHOCYTES NFR BLD AUTO: 27.1 %
LYMPHOCYTES NFR BLD AUTO: 34 %
LYMPHOCYTES NFR BLD AUTO: 7.3 %
MAGNESIUM SERPL-MCNC: 1.4 MG/DL (ref 1.6–2.3)
MAGNESIUM SERPL-MCNC: 1.7 MG/DL (ref 1.6–2.3)
MAGNESIUM SERPL-MCNC: 1.9 MG/DL (ref 1.6–2.3)
MAGNESIUM SERPL-MCNC: 2 MG/DL (ref 1.6–2.3)
MAGNESIUM SERPL-MCNC: 2.1 MG/DL (ref 1.6–2.3)
MCH RBC QN AUTO: 31.8 PG (ref 26.5–33)
MCH RBC QN AUTO: 32 PG (ref 26.5–33)
MCH RBC QN AUTO: 32 PG (ref 26.5–33)
MCH RBC QN AUTO: 32.2 PG (ref 26.5–33)
MCH RBC QN AUTO: 32.4 PG (ref 26.5–33)
MCH RBC QN AUTO: 32.4 PG (ref 26.5–33)
MCH RBC QN AUTO: 32.8 PG (ref 26.5–33)
MCH RBC QN AUTO: 32.9 PG (ref 26.5–33)
MCHC RBC AUTO-ENTMCNC: 28.9 G/DL (ref 31.5–36.5)
MCHC RBC AUTO-ENTMCNC: 33.7 G/DL (ref 31.5–36.5)
MCHC RBC AUTO-ENTMCNC: 33.7 G/DL (ref 31.5–36.5)
MCHC RBC AUTO-ENTMCNC: 33.9 G/DL (ref 31.5–36.5)
MCHC RBC AUTO-ENTMCNC: 33.9 G/DL (ref 31.5–36.5)
MCHC RBC AUTO-ENTMCNC: 34 G/DL (ref 31.5–36.5)
MCHC RBC AUTO-ENTMCNC: 34.1 G/DL (ref 31.5–36.5)
MCHC RBC AUTO-ENTMCNC: 34.2 G/DL (ref 31.5–36.5)
MCV RBC AUTO: 111 FL (ref 78–100)
MCV RBC AUTO: 93 FL (ref 78–100)
MCV RBC AUTO: 95 FL (ref 78–100)
MCV RBC AUTO: 95 FL (ref 78–100)
MCV RBC AUTO: 96 FL (ref 78–100)
MCV RBC AUTO: 97 FL (ref 78–100)
MONOCYTES # BLD AUTO: 0.1 10E9/L (ref 0–1.3)
MONOCYTES # BLD AUTO: 0.8 10E9/L (ref 0–1.3)
MONOCYTES # BLD AUTO: 0.9 10E9/L (ref 0–1.3)
MONOCYTES # BLD AUTO: 1.5 10E9/L (ref 0–1.3)
MONOCYTES NFR BLD AUTO: 1 %
MONOCYTES NFR BLD AUTO: 12 %
MONOCYTES NFR BLD AUTO: 6.5 %
MONOCYTES NFR BLD AUTO: 7.7 %
MYELOCYTES # BLD: 0.2 10E9/L
MYELOCYTES NFR BLD MANUAL: 2 %
NEUTROPHILS # BLD AUTO: 6.1 10E9/L (ref 1.6–8.3)
NEUTROPHILS # BLD AUTO: 7.4 10E9/L (ref 1.6–8.3)
NEUTROPHILS # BLD AUTO: 8.5 10E9/L (ref 1.6–8.3)
NEUTROPHILS # BLD AUTO: 9.6 10E9/L (ref 1.6–8.3)
NEUTROPHILS NFR BLD AUTO: 50 %
NEUTROPHILS NFR BLD AUTO: 62.6 %
NEUTROPHILS NFR BLD AUTO: 77 %
NEUTROPHILS NFR BLD AUTO: 90.9 %
NRBC # BLD AUTO: 0 10*3/UL
NRBC BLD AUTO-RTO: 0 /100
PHOSPHATE SERPL-MCNC: 3.5 MG/DL (ref 2.5–4.5)
PHOSPHATE SERPL-MCNC: 3.7 MG/DL (ref 2.5–4.5)
PHOSPHATE SERPL-MCNC: 3.7 MG/DL (ref 2.5–4.5)
PHOSPHATE SERPL-MCNC: 4.2 MG/DL (ref 2.5–4.5)
PLATELET # BLD AUTO: 207 10E9/L (ref 150–450)
PLATELET # BLD AUTO: 210 10E9/L (ref 150–450)
PLATELET # BLD AUTO: 224 10E9/L (ref 150–450)
PLATELET # BLD AUTO: 226 10E9/L (ref 150–450)
PLATELET # BLD AUTO: 233 10E9/L (ref 150–450)
PLATELET # BLD AUTO: 236 10E9/L (ref 150–450)
PLATELET # BLD AUTO: 266 10E9/L (ref 150–450)
PLATELET # BLD AUTO: 333 10E9/L (ref 150–450)
POLYCHROMASIA BLD QL SMEAR: ABNORMAL
POTASSIUM SERPL-SCNC: 3.2 MMOL/L (ref 3.4–5.3)
POTASSIUM SERPL-SCNC: 3.3 MMOL/L (ref 3.4–5.3)
POTASSIUM SERPL-SCNC: 3.3 MMOL/L (ref 3.4–5.3)
POTASSIUM SERPL-SCNC: 3.4 MMOL/L (ref 3.4–5.3)
POTASSIUM SERPL-SCNC: 3.5 MMOL/L (ref 3.4–5.3)
POTASSIUM SERPL-SCNC: 3.6 MMOL/L (ref 3.4–5.3)
POTASSIUM SERPL-SCNC: 3.8 MMOL/L (ref 3.4–5.3)
POTASSIUM SERPL-SCNC: 3.9 MMOL/L (ref 3.4–5.3)
POTASSIUM SERPL-SCNC: 4.1 MMOL/L (ref 3.4–5.3)
POTASSIUM SERPL-SCNC: 5.4 MMOL/L (ref 3.4–5.3)
PROT SERPL-MCNC: 5.5 G/DL (ref 6.8–8.8)
PROT SERPL-MCNC: 6.6 G/DL (ref 6.8–8.8)
PROT SERPL-MCNC: 6.8 G/DL (ref 6.8–8.8)
PROT SERPL-MCNC: 6.8 G/DL (ref 6.8–8.8)
PROT SERPL-MCNC: 7 G/DL (ref 6.8–8.8)
PROT SERPL-MCNC: 8.3 G/DL (ref 6.8–8.8)
RBC # BLD AUTO: 2.91 10E12/L (ref 3.8–5.2)
RBC # BLD AUTO: 3.46 10E12/L (ref 3.8–5.2)
RBC # BLD AUTO: 3.54 10E12/L (ref 3.8–5.2)
RBC # BLD AUTO: 3.6 10E12/L (ref 3.8–5.2)
RBC # BLD AUTO: 3.64 10E12/L (ref 3.8–5.2)
RBC # BLD AUTO: 3.73 10E12/L (ref 3.8–5.2)
RBC # BLD AUTO: 4.15 10E12/L (ref 3.8–5.2)
RBC # BLD AUTO: 4.28 10E12/L (ref 3.8–5.2)
SARS-COV-2 RNA SPEC QL NAA+PROBE: NEGATIVE
SARS-COV-2 RNA SPEC QL NAA+PROBE: NEGATIVE
SARS-COV-2 RNA SPEC QL NAA+PROBE: NORMAL
SARS-COV-2 RNA SPEC QL NAA+PROBE: NORMAL
SARS-COV-2 RNA SPEC QL NAA+PROBE: NOT DETECTED
SODIUM SERPL-SCNC: 130 MMOL/L (ref 133–144)
SODIUM SERPL-SCNC: 132 MMOL/L (ref 133–144)
SODIUM SERPL-SCNC: 135 MMOL/L (ref 133–144)
SODIUM SERPL-SCNC: 137 MMOL/L (ref 133–144)
SODIUM SERPL-SCNC: 137 MMOL/L (ref 133–144)
SODIUM SERPL-SCNC: 139 MMOL/L (ref 133–144)
SODIUM SERPL-SCNC: 139 MMOL/L (ref 133–144)
SODIUM SERPL-SCNC: 141 MMOL/L (ref 133–144)
SPECIMEN SOURCE: NORMAL
WBC # BLD AUTO: 11.2 10E9/L (ref 4–11)
WBC # BLD AUTO: 11.8 10E9/L (ref 4–11)
WBC # BLD AUTO: 12.1 10E9/L (ref 4–11)
WBC # BLD AUTO: 12.4 10E9/L (ref 4–11)
WBC # BLD AUTO: 9.2 10E9/L (ref 4–11)
WBC # BLD AUTO: 9.3 10E9/L (ref 4–11)
WBC # BLD AUTO: 9.5 10E9/L (ref 4–11)
WBC # BLD AUTO: 9.9 10E9/L (ref 4–11)

## 2020-01-01 PROCEDURE — 85027 COMPLETE CBC AUTOMATED: CPT | Performed by: FAMILY MEDICINE

## 2020-01-01 PROCEDURE — 258N000003 HC RX IP 258 OP 636: Performed by: FAMILY MEDICINE

## 2020-01-01 PROCEDURE — 99223 1ST HOSP IP/OBS HIGH 75: CPT | Mod: AI | Performed by: PHYSICIAN ASSISTANT

## 2020-01-01 PROCEDURE — 0BP1XFZ REMOVAL OF TRACHEOSTOMY DEVICE FROM TRACHEA, EXTERNAL APPROACH: ICD-10-PCS | Performed by: INTERNAL MEDICINE

## 2020-01-01 PROCEDURE — 272N000078 HC NUTRITION PRODUCT INTERMEDIATE LITER

## 2020-01-01 PROCEDURE — 99285 EMERGENCY DEPT VISIT HI MDM: CPT | Mod: 25

## 2020-01-01 PROCEDURE — 85025 COMPLETE CBC W/AUTO DIFF WBC: CPT | Performed by: PHYSICIAN ASSISTANT

## 2020-01-01 PROCEDURE — 250N000011 HC RX IP 250 OP 636: Performed by: PHYSICIAN ASSISTANT

## 2020-01-01 PROCEDURE — 250N000013 HC RX MED GY IP 250 OP 250 PS 637: Performed by: FAMILY MEDICINE

## 2020-01-01 PROCEDURE — 250N000011 HC RX IP 250 OP 636: Performed by: FAMILY MEDICINE

## 2020-01-01 PROCEDURE — 80053 COMPREHEN METABOLIC PANEL: CPT | Performed by: FAMILY MEDICINE

## 2020-01-01 PROCEDURE — 85610 PROTHROMBIN TIME: CPT | Performed by: PHYSICIAN ASSISTANT

## 2020-01-01 PROCEDURE — 022N000001 HC SNF RUG CODE OPNP

## 2020-01-01 PROCEDURE — 200N000001 HC R&B ICU

## 2020-01-01 PROCEDURE — 761N000003 HC RECOVERY PHASE 1 LEVEL 2 FIRST HR: Performed by: OTOLARYNGOLOGY

## 2020-01-01 PROCEDURE — 99284 EMERGENCY DEPT VISIT MOD MDM: CPT | Mod: 25 | Performed by: PHYSICIAN ASSISTANT

## 2020-01-01 PROCEDURE — 5A12012 PERFORMANCE OF CARDIAC OUTPUT, SINGLE, MANUAL: ICD-10-PCS | Performed by: INTERNAL MEDICINE

## 2020-01-01 PROCEDURE — 250N000009 HC RX 250: Performed by: OTOLARYNGOLOGY

## 2020-01-01 PROCEDURE — 36415 COLL VENOUS BLD VENIPUNCTURE: CPT | Performed by: FAMILY MEDICINE

## 2020-01-01 PROCEDURE — 88342 IMHCHEM/IMCYTCHM 1ST ANTB: CPT | Mod: 26 | Performed by: PATHOLOGY

## 2020-01-01 PROCEDURE — 120N000009 HC R&B SNF

## 2020-01-01 PROCEDURE — 83036 HEMOGLOBIN GLYCOSYLATED A1C: CPT | Performed by: INTERNAL MEDICINE

## 2020-01-01 PROCEDURE — 84100 ASSAY OF PHOSPHORUS: CPT | Performed by: FAMILY MEDICINE

## 2020-01-01 PROCEDURE — 3E0G76Z INTRODUCTION OF NUTRITIONAL SUBSTANCE INTO UPPER GI, VIA NATURAL OR ARTIFICIAL OPENING: ICD-10-PCS | Performed by: OTOLARYNGOLOGY

## 2020-01-01 PROCEDURE — 250N000011 HC RX IP 250 OP 636: Performed by: NURSE ANESTHETIST, CERTIFIED REGISTERED

## 2020-01-01 PROCEDURE — 0CBS8ZX EXCISION OF LARYNX, VIA NATURAL OR ARTIFICIAL OPENING ENDOSCOPIC, DIAGNOSTIC: ICD-10-PCS | Performed by: OTOLARYNGOLOGY

## 2020-01-01 PROCEDURE — 85025 COMPLETE CBC W/AUTO DIFF WBC: CPT | Performed by: NURSE PRACTITIONER

## 2020-01-01 PROCEDURE — 250N000011 HC RX IP 250 OP 636: Performed by: INTERNAL MEDICINE

## 2020-01-01 PROCEDURE — 999N000157 HC STATISTIC RCP TIME EA 10 MIN

## 2020-01-01 PROCEDURE — 99207 PR NO CHARGE LOS: CPT | Performed by: AUDIOLOGIST

## 2020-01-01 PROCEDURE — 97165 OT EVAL LOW COMPLEX 30 MIN: CPT | Mod: GO

## 2020-01-01 PROCEDURE — 999N001017 HC STATISTIC GLUCOSE BY METER IP

## 2020-01-01 PROCEDURE — 84132 ASSAY OF SERUM POTASSIUM: CPT | Performed by: FAMILY MEDICINE

## 2020-01-01 PROCEDURE — G0463 HOSPITAL OUTPT CLINIC VISIT: HCPCS | Mod: 25 | Performed by: NURSE PRACTITIONER

## 2020-01-01 PROCEDURE — 92610 EVALUATE SWALLOWING FUNCTION: CPT | Mod: GN | Performed by: SPEECH-LANGUAGE PATHOLOGIST

## 2020-01-01 PROCEDURE — 93010 ELECTROCARDIOGRAM REPORT: CPT | Mod: 59 | Performed by: PHYSICIAN ASSISTANT

## 2020-01-01 PROCEDURE — 99233 SBSQ HOSP IP/OBS HIGH 50: CPT | Performed by: FAMILY MEDICINE

## 2020-01-01 PROCEDURE — 99232 SBSQ HOSP IP/OBS MODERATE 35: CPT | Performed by: FAMILY MEDICINE

## 2020-01-01 PROCEDURE — 80048 BASIC METABOLIC PNL TOTAL CA: CPT | Performed by: NURSE PRACTITIONER

## 2020-01-01 PROCEDURE — 85730 THROMBOPLASTIN TIME PARTIAL: CPT | Performed by: PHYSICIAN ASSISTANT

## 2020-01-01 PROCEDURE — 31575 DIAGNOSTIC LARYNGOSCOPY: CPT | Performed by: EMERGENCY MEDICINE

## 2020-01-01 PROCEDURE — 83735 ASSAY OF MAGNESIUM: CPT | Performed by: FAMILY MEDICINE

## 2020-01-01 PROCEDURE — 88342 IMHCHEM/IMCYTCHM 1ST ANTB: CPT | Mod: TC | Performed by: OTOLARYNGOLOGY

## 2020-01-01 PROCEDURE — 272N000002 HC OR SUPPLY OTHER OPNP: Performed by: OTOLARYNGOLOGY

## 2020-01-01 PROCEDURE — 99291 CRITICAL CARE FIRST HOUR: CPT | Performed by: EMERGENCY MEDICINE

## 2020-01-01 PROCEDURE — 80053 COMPREHEN METABOLIC PANEL: CPT | Performed by: PHYSICIAN ASSISTANT

## 2020-01-01 PROCEDURE — 31575 DIAGNOSTIC LARYNGOSCOPY: CPT | Performed by: OTOLARYNGOLOGY

## 2020-01-01 PROCEDURE — 27780 TREATMENT OF FIBULA FRACTURE: CPT | Mod: 54 | Performed by: PHYSICIAN ASSISTANT

## 2020-01-01 PROCEDURE — 250N000009 HC RX 250: Performed by: NURSE ANESTHETIST, CERTIFIED REGISTERED

## 2020-01-01 PROCEDURE — 99207 ZZC FRACTURE CARE IN GLOBAL PERIOD: CPT | Performed by: FAMILY MEDICINE

## 2020-01-01 PROCEDURE — 36415 COLL VENOUS BLD VENIPUNCTURE: CPT | Performed by: PHYSICIAN ASSISTANT

## 2020-01-01 PROCEDURE — 250N000009 HC RX 250: Performed by: RADIOLOGY

## 2020-01-01 PROCEDURE — 999N000178 HC STATISTIC SUCTION SPUTUM

## 2020-01-01 PROCEDURE — 370N000002 HC ANESTHESIA TECHNICAL FEE, EACH ADDTL 15 MIN: Performed by: OTOLARYNGOLOGY

## 2020-01-01 PROCEDURE — 999N000156 HC STATISTIC RCP CONSULT EA 30 MIN

## 2020-01-01 PROCEDURE — 250N000011 HC RX IP 250 OP 636: Performed by: RADIOLOGY

## 2020-01-01 PROCEDURE — 80053 COMPREHEN METABOLIC PANEL: CPT | Performed by: EMERGENCY MEDICINE

## 2020-01-01 PROCEDURE — 96375 TX/PRO/DX INJ NEW DRUG ADDON: CPT | Performed by: EMERGENCY MEDICINE

## 2020-01-01 PROCEDURE — 360N000014 HC SURGERY LEVEL 2 1ST 30 MIN: Performed by: OTOLARYNGOLOGY

## 2020-01-01 PROCEDURE — 272N000001 HC OR GENERAL SUPPLY STERILE: Performed by: OTOLARYNGOLOGY

## 2020-01-01 PROCEDURE — 36415 COLL VENOUS BLD VENIPUNCTURE: CPT | Performed by: INTERNAL MEDICINE

## 2020-01-01 PROCEDURE — 73590 X-RAY EXAM OF LOWER LEG: CPT | Mod: LT

## 2020-01-01 PROCEDURE — 83735 ASSAY OF MAGNESIUM: CPT | Performed by: PHYSICIAN ASSISTANT

## 2020-01-01 PROCEDURE — 99204 OFFICE O/P NEW MOD 45 MIN: CPT | Mod: 25 | Performed by: OTOLARYNGOLOGY

## 2020-01-01 PROCEDURE — 999N000009 HC STATISTIC AIRWAY CARE

## 2020-01-01 PROCEDURE — 80048 BASIC METABOLIC PNL TOTAL CA: CPT | Performed by: FAMILY MEDICINE

## 2020-01-01 PROCEDURE — 88305 TISSUE EXAM BY PATHOLOGIST: CPT | Mod: TC | Performed by: OTOLARYNGOLOGY

## 2020-01-01 PROCEDURE — 74230 X-RAY XM SWLNG FUNCJ C+: CPT

## 2020-01-01 PROCEDURE — 36415 COLL VENOUS BLD VENIPUNCTURE: CPT | Performed by: NURSE PRACTITIONER

## 2020-01-01 PROCEDURE — U0003 INFECTIOUS AGENT DETECTION BY NUCLEIC ACID (DNA OR RNA); SEVERE ACUTE RESPIRATORY SYNDROME CORONAVIRUS 2 (SARS-COV-2) (CORONAVIRUS DISEASE [COVID-19]), AMPLIFIED PROBE TECHNIQUE, MAKING USE OF HIGH THROUGHPUT TECHNOLOGIES AS DESCRIBED BY CMS-2020-01-R: HCPCS | Performed by: NURSE PRACTITIONER

## 2020-01-01 PROCEDURE — 0B21XFZ CHANGE TRACHEOSTOMY DEVICE IN TRACHEA, EXTERNAL APPROACH: ICD-10-PCS | Performed by: OTOLARYNGOLOGY

## 2020-01-01 PROCEDURE — 250N000003 HC SEVOFLURANE, EA 15 MIN: Performed by: OTOLARYNGOLOGY

## 2020-01-01 PROCEDURE — 0B110F4 BYPASS TRACHEA TO CUTANEOUS WITH TRACHEOSTOMY DEVICE, OPEN APPROACH: ICD-10-PCS | Performed by: OTOLARYNGOLOGY

## 2020-01-01 PROCEDURE — 71045 X-RAY EXAM CHEST 1 VIEW: CPT

## 2020-01-01 PROCEDURE — 99214 OFFICE O/P EST MOD 30 MIN: CPT | Performed by: NURSE PRACTITIONER

## 2020-01-01 PROCEDURE — 96374 THER/PROPH/DIAG INJ IV PUSH: CPT | Performed by: EMERGENCY MEDICINE

## 2020-01-01 PROCEDURE — 88305 TISSUE EXAM BY PATHOLOGIST: CPT | Mod: 26 | Performed by: PATHOLOGY

## 2020-01-01 PROCEDURE — 94640 AIRWAY INHALATION TREATMENT: CPT | Performed by: NURSE PRACTITIONER

## 2020-01-01 PROCEDURE — 92950 HEART/LUNG RESUSCITATION CPR: CPT | Performed by: EMERGENCY MEDICINE

## 2020-01-01 PROCEDURE — 97161 PT EVAL LOW COMPLEX 20 MIN: CPT | Mod: GP

## 2020-01-01 PROCEDURE — 999N000065 XR ABDOMEN PORT 1 VW

## 2020-01-01 PROCEDURE — 999N000136 HC STATISTIC PRE PROC ASSESS II: Performed by: OTOLARYNGOLOGY

## 2020-01-01 PROCEDURE — 27780 TREATMENT OF FIBULA FRACTURE: CPT | Mod: 55 | Performed by: FAMILY MEDICINE

## 2020-01-01 PROCEDURE — 85025 COMPLETE CBC W/AUTO DIFF WBC: CPT | Performed by: EMERGENCY MEDICINE

## 2020-01-01 PROCEDURE — 93005 ELECTROCARDIOGRAM TRACING: CPT

## 2020-01-01 PROCEDURE — 258N000003 HC RX IP 258 OP 636: Performed by: OTOLARYNGOLOGY

## 2020-01-01 PROCEDURE — U0003 INFECTIOUS AGENT DETECTION BY NUCLEIC ACID (DNA OR RNA); SEVERE ACUTE RESPIRATORY SYNDROME CORONAVIRUS 2 (SARS-COV-2) (CORONAVIRUS DISEASE [COVID-19]), AMPLIFIED PROBE TECHNIQUE, MAKING USE OF HIGH THROUGHPUT TECHNOLOGIES AS DESCRIBED BY CMS-2020-01-R: HCPCS | Performed by: OTOLARYNGOLOGY

## 2020-01-01 PROCEDURE — 999N000026 HC STATISTIC CARDIOPULM RESUSCITATION

## 2020-01-01 PROCEDURE — 99291 CRITICAL CARE FIRST HOUR: CPT | Mod: 25 | Performed by: EMERGENCY MEDICINE

## 2020-01-01 PROCEDURE — 82565 ASSAY OF CREATININE: CPT | Performed by: FAMILY MEDICINE

## 2020-01-01 PROCEDURE — 99309 SBSQ NF CARE MODERATE MDM 30: CPT | Performed by: INTERNAL MEDICINE

## 2020-01-01 PROCEDURE — 27780 TREATMENT OF FIBULA FRACTURE: CPT

## 2020-01-01 PROCEDURE — 258N000003 HC RX IP 258 OP 636: Performed by: NURSE ANESTHETIST, CERTIFIED REGISTERED

## 2020-01-01 PROCEDURE — U0003 INFECTIOUS AGENT DETECTION BY NUCLEIC ACID (DNA OR RNA); SEVERE ACUTE RESPIRATORY SYNDROME CORONAVIRUS 2 (SARS-COV-2) (CORONAVIRUS DISEASE [COVID-19]), AMPLIFIED PROBE TECHNIQUE, MAKING USE OF HIGH THROUGHPUT TECHNOLOGIES AS DESCRIBED BY CMS-2020-01-R: HCPCS | Performed by: FAMILY MEDICINE

## 2020-01-01 PROCEDURE — 360N000015 HC SURGERY LEVEL 2 EA 15 ADDTL MIN: Performed by: OTOLARYNGOLOGY

## 2020-01-01 PROCEDURE — 99207 PR CDG-HISTORY COMP: MEETS EXP. PROBLEM FOCUSED - DOWN CODED LACK OF HPI: CPT | Performed by: INTERNAL MEDICINE

## 2020-01-01 PROCEDURE — 0DH67UZ INSERTION OF FEEDING DEVICE INTO STOMACH, VIA NATURAL OR ARTIFICIAL OPENING: ICD-10-PCS | Performed by: OTOLARYNGOLOGY

## 2020-01-01 PROCEDURE — 70491 CT SOFT TISSUE NECK W/DYE: CPT

## 2020-01-01 PROCEDURE — 82140 ASSAY OF AMMONIA: CPT | Performed by: PHYSICIAN ASSISTANT

## 2020-01-01 PROCEDURE — 99239 HOSP IP/OBS DSCHRG MGMT >30: CPT | Performed by: FAMILY MEDICINE

## 2020-01-01 PROCEDURE — 92611 MOTION FLUOROSCOPY/SWALLOW: CPT | Mod: GN | Performed by: SPEECH-LANGUAGE PATHOLOGIST

## 2020-01-01 PROCEDURE — 99441 PR PHYSICIAN TELEPHONE EVALUATION 5-10 MIN: CPT | Performed by: NURSE PRACTITIONER

## 2020-01-01 PROCEDURE — A9270 NON-COVERED ITEM OR SERVICE: HCPCS | Performed by: NURSE PRACTITIONER

## 2020-01-01 PROCEDURE — 370N000001 HC ANESTHESIA TECHNICAL FEE, 1ST 30 MIN: Performed by: OTOLARYNGOLOGY

## 2020-01-01 PROCEDURE — 250N000013 HC RX MED GY IP 250 OP 250 PS 637: Performed by: NURSE PRACTITIONER

## 2020-01-01 RX ORDER — BARIUM SULFATE 400 MG/ML
10 SUSPENSION ORAL ONCE
Status: COMPLETED | OUTPATIENT
Start: 2020-01-01 | End: 2020-01-01

## 2020-01-01 RX ORDER — ONDANSETRON 4 MG/1
4 TABLET, ORALLY DISINTEGRATING ORAL EVERY 6 HOURS PRN
Status: DISCONTINUED | OUTPATIENT
Start: 2020-01-01 | End: 2020-01-01 | Stop reason: HOSPADM

## 2020-01-01 RX ORDER — DEXTROSE MONOHYDRATE 100 MG/ML
INJECTION, SOLUTION INTRAVENOUS CONTINUOUS PRN
Status: DISCONTINUED | OUTPATIENT
Start: 2020-01-01 | End: 2020-01-01 | Stop reason: HOSPADM

## 2020-01-01 RX ORDER — DEXTROSE MONOHYDRATE 25 G/50ML
25-50 INJECTION, SOLUTION INTRAVENOUS
Status: DISCONTINUED | OUTPATIENT
Start: 2020-01-01 | End: 2020-01-01 | Stop reason: HOSPADM

## 2020-01-01 RX ORDER — NALOXONE HYDROCHLORIDE 0.4 MG/ML
0.4 INJECTION, SOLUTION INTRAMUSCULAR; INTRAVENOUS; SUBCUTANEOUS
Status: CANCELLED | OUTPATIENT
Start: 2020-01-01

## 2020-01-01 RX ORDER — LIDOCAINE HYDROCHLORIDE 40 MG/ML
INJECTION, SOLUTION RETROBULBAR PRN
Status: DISCONTINUED | OUTPATIENT
Start: 2020-01-01 | End: 2020-01-01 | Stop reason: HOSPADM

## 2020-01-01 RX ORDER — AMOXICILLIN 250 MG
2 CAPSULE ORAL 2 TIMES DAILY PRN
Status: DISCONTINUED | OUTPATIENT
Start: 2020-01-01 | End: 2020-01-01 | Stop reason: HOSPADM

## 2020-01-01 RX ORDER — LIDOCAINE 40 MG/G
CREAM TOPICAL
Status: DISCONTINUED | OUTPATIENT
Start: 2020-01-01 | End: 2020-01-01 | Stop reason: HOSPADM

## 2020-01-01 RX ORDER — ALBUTEROL SULFATE 90 UG/1
2 AEROSOL, METERED RESPIRATORY (INHALATION) EVERY 4 HOURS PRN
Status: DISCONTINUED | OUTPATIENT
Start: 2020-01-01 | End: 2020-01-01 | Stop reason: HOSPADM

## 2020-01-01 RX ORDER — IOPAMIDOL 755 MG/ML
80 INJECTION, SOLUTION INTRAVASCULAR ONCE
Status: COMPLETED | OUTPATIENT
Start: 2020-01-01 | End: 2020-01-01

## 2020-01-01 RX ORDER — SPIRONOLACTONE 25 MG/1
TABLET ORAL
Qty: 15 TABLET | Refills: 0 | Status: SHIPPED | OUTPATIENT
Start: 2020-01-01 | End: 2020-01-01

## 2020-01-01 RX ORDER — MAGNESIUM SULFATE HEPTAHYDRATE 40 MG/ML
2 INJECTION, SOLUTION INTRAVENOUS ONCE
Status: COMPLETED | OUTPATIENT
Start: 2020-01-01 | End: 2020-01-01

## 2020-01-01 RX ORDER — HEPARIN SODIUM 5000 [USP'U]/.5ML
5000 INJECTION, SOLUTION INTRAVENOUS; SUBCUTANEOUS EVERY 12 HOURS
Status: DISCONTINUED | OUTPATIENT
Start: 2020-01-01 | End: 2020-01-01

## 2020-01-01 RX ORDER — LIDOCAINE HYDROCHLORIDE AND EPINEPHRINE 10; 10 MG/ML; UG/ML
INJECTION, SOLUTION INFILTRATION; PERINEURAL PRN
Status: DISCONTINUED | OUTPATIENT
Start: 2020-01-01 | End: 2020-01-01 | Stop reason: HOSPADM

## 2020-01-01 RX ORDER — PROCHLORPERAZINE MALEATE 10 MG
10 TABLET ORAL EVERY 6 HOURS PRN
Status: DISCONTINUED | OUTPATIENT
Start: 2020-01-01 | End: 2020-01-01 | Stop reason: HOSPADM

## 2020-01-01 RX ORDER — OXYCODONE HCL 5 MG/5 ML
5 SOLUTION, ORAL ORAL EVERY 4 HOURS PRN
Status: CANCELLED | OUTPATIENT
Start: 2020-01-01

## 2020-01-01 RX ORDER — NALOXONE HYDROCHLORIDE 0.4 MG/ML
0.4 INJECTION, SOLUTION INTRAMUSCULAR; INTRAVENOUS; SUBCUTANEOUS
Status: DISCONTINUED | OUTPATIENT
Start: 2020-01-01 | End: 2020-01-01 | Stop reason: HOSPADM

## 2020-01-01 RX ORDER — NALOXONE HYDROCHLORIDE 0.4 MG/ML
0.2 INJECTION, SOLUTION INTRAMUSCULAR; INTRAVENOUS; SUBCUTANEOUS
Status: ACTIVE | OUTPATIENT
Start: 2020-01-01 | End: 2020-01-01

## 2020-01-01 RX ORDER — NALOXONE HYDROCHLORIDE 0.4 MG/ML
0.2 INJECTION, SOLUTION INTRAMUSCULAR; INTRAVENOUS; SUBCUTANEOUS
Status: DISCONTINUED | OUTPATIENT
Start: 2020-01-01 | End: 2020-01-01 | Stop reason: HOSPADM

## 2020-01-01 RX ORDER — ALBUTEROL SULFATE 90 UG/1
2 AEROSOL, METERED RESPIRATORY (INHALATION) EVERY 4 HOURS PRN
Status: CANCELLED | OUTPATIENT
Start: 2020-01-01

## 2020-01-01 RX ORDER — FENTANYL CITRATE 50 UG/ML
25-50 INJECTION, SOLUTION INTRAMUSCULAR; INTRAVENOUS
Status: DISCONTINUED | OUTPATIENT
Start: 2020-01-01 | End: 2020-01-01 | Stop reason: HOSPADM

## 2020-01-01 RX ORDER — GLYCOPYRROLATE 0.2 MG/ML
INJECTION, SOLUTION INTRAMUSCULAR; INTRAVENOUS PRN
Status: DISCONTINUED | OUTPATIENT
Start: 2020-01-01 | End: 2020-01-01

## 2020-01-01 RX ORDER — ACETAMINOPHEN 650 MG/1
650 SUPPOSITORY RECTAL EVERY 4 HOURS PRN
Status: CANCELLED | OUTPATIENT
Start: 2020-01-01

## 2020-01-01 RX ORDER — NALOXONE HYDROCHLORIDE 0.4 MG/ML
0.4 INJECTION, SOLUTION INTRAMUSCULAR; INTRAVENOUS; SUBCUTANEOUS
Status: ACTIVE | OUTPATIENT
Start: 2020-01-01 | End: 2020-01-01

## 2020-01-01 RX ORDER — ALBUTEROL SULFATE 90 UG/1
2 AEROSOL, METERED RESPIRATORY (INHALATION) EVERY 4 HOURS PRN
Qty: 18 G | Refills: 3 | Status: SHIPPED | OUTPATIENT
Start: 2020-01-01

## 2020-01-01 RX ORDER — DEXTROSE MONOHYDRATE 25 G/50ML
25-50 INJECTION, SOLUTION INTRAVENOUS
Status: CANCELLED | OUTPATIENT
Start: 2020-01-01

## 2020-01-01 RX ORDER — OXYCODONE HCL 5 MG/5 ML
5 SOLUTION, ORAL ORAL EVERY 4 HOURS PRN
Qty: 160 ML | Refills: 0 | Status: SHIPPED | OUTPATIENT
Start: 2020-01-01

## 2020-01-01 RX ORDER — SULFAMETHOXAZOLE AND TRIMETHOPRIM 200; 40 MG/5ML; MG/5ML
20 SUSPENSION ORAL 2 TIMES DAILY
Status: DISCONTINUED | OUTPATIENT
Start: 2020-01-01 | End: 2020-01-01 | Stop reason: HOSPADM

## 2020-01-01 RX ORDER — NICOTINE POLACRILEX 4 MG
15-30 LOZENGE BUCCAL
Status: CANCELLED | OUTPATIENT
Start: 2020-01-01

## 2020-01-01 RX ORDER — NICOTINE POLACRILEX 4 MG
15-30 LOZENGE BUCCAL
Status: DISCONTINUED | OUTPATIENT
Start: 2020-01-01 | End: 2020-01-01 | Stop reason: HOSPADM

## 2020-01-01 RX ORDER — ALBUTEROL SULFATE 90 UG/1
6 AEROSOL, METERED RESPIRATORY (INHALATION) EVERY 6 HOURS PRN
Status: DISCONTINUED | OUTPATIENT
Start: 2020-01-01 | End: 2020-01-01 | Stop reason: HOSPADM

## 2020-01-01 RX ORDER — AMOXICILLIN 250 MG
1 CAPSULE ORAL 2 TIMES DAILY PRN
Status: DISCONTINUED | OUTPATIENT
Start: 2020-01-01 | End: 2020-01-01 | Stop reason: HOSPADM

## 2020-01-01 RX ORDER — SULFAMETHOXAZOLE/TRIMETHOPRIM 800-160 MG
1 TABLET ORAL 2 TIMES DAILY
Status: DISCONTINUED | OUTPATIENT
Start: 2020-01-01 | End: 2020-01-01 | Stop reason: CLARIF

## 2020-01-01 RX ORDER — DEXAMETHASONE SODIUM PHOSPHATE 10 MG/ML
INJECTION, SOLUTION INTRAMUSCULAR; INTRAVENOUS PRN
Status: DISCONTINUED | OUTPATIENT
Start: 2020-01-01 | End: 2020-01-01

## 2020-01-01 RX ORDER — FENTANYL CITRATE 50 UG/ML
INJECTION, SOLUTION INTRAMUSCULAR; INTRAVENOUS PRN
Status: DISCONTINUED | OUTPATIENT
Start: 2020-01-01 | End: 2020-01-01

## 2020-01-01 RX ORDER — SODIUM CHLORIDE, SODIUM LACTATE, POTASSIUM CHLORIDE, CALCIUM CHLORIDE 600; 310; 30; 20 MG/100ML; MG/100ML; MG/100ML; MG/100ML
INJECTION, SOLUTION INTRAVENOUS CONTINUOUS
Status: DISCONTINUED | OUTPATIENT
Start: 2020-01-01 | End: 2020-01-01

## 2020-01-01 RX ORDER — NALOXONE HYDROCHLORIDE 0.4 MG/ML
0.2 INJECTION, SOLUTION INTRAMUSCULAR; INTRAVENOUS; SUBCUTANEOUS
Status: CANCELLED | OUTPATIENT
Start: 2020-01-01

## 2020-01-01 RX ORDER — OXYCODONE HCL 5 MG/5 ML
5 SOLUTION, ORAL ORAL EVERY 4 HOURS PRN
Status: DISCONTINUED | OUTPATIENT
Start: 2020-01-01 | End: 2020-01-01 | Stop reason: HOSPADM

## 2020-01-01 RX ORDER — DEXTROSE, SODIUM CHLORIDE, SODIUM LACTATE, POTASSIUM CHLORIDE, AND CALCIUM CHLORIDE 5; .6; .31; .03; .02 G/100ML; G/100ML; G/100ML; G/100ML; G/100ML
INJECTION, SOLUTION INTRAVENOUS CONTINUOUS
Status: DISCONTINUED | OUTPATIENT
Start: 2020-01-01 | End: 2020-01-01

## 2020-01-01 RX ORDER — ACETAMINOPHEN 325 MG/10.15ML
650 LIQUID ORAL EVERY 4 HOURS PRN
Status: DISCONTINUED | OUTPATIENT
Start: 2020-01-01 | End: 2020-01-01 | Stop reason: HOSPADM

## 2020-01-01 RX ORDER — PREDNISONE 20 MG/1
TABLET ORAL
Qty: 20 TABLET | Refills: 0 | Status: ON HOLD | OUTPATIENT
Start: 2020-01-01 | End: 2020-01-01

## 2020-01-01 RX ORDER — AZITHROMYCIN 250 MG/1
TABLET, FILM COATED ORAL
Qty: 6 TABLET | Refills: 0 | Status: SHIPPED | OUTPATIENT
Start: 2020-01-01 | End: 2020-01-01

## 2020-01-01 RX ORDER — ACETAMINOPHEN 650 MG/1
650 SUPPOSITORY RECTAL EVERY 4 HOURS PRN
Status: DISCONTINUED | OUTPATIENT
Start: 2020-01-01 | End: 2020-01-01 | Stop reason: HOSPADM

## 2020-01-01 RX ORDER — ACETAMINOPHEN 160 MG
TABLET,DISINTEGRATING ORAL EVERY 8 HOURS PRN
Status: DISCONTINUED | OUTPATIENT
Start: 2020-01-01 | End: 2020-01-01 | Stop reason: HOSPADM

## 2020-01-01 RX ORDER — SODIUM CHLORIDE, SODIUM LACTATE, POTASSIUM CHLORIDE, CALCIUM CHLORIDE 600; 310; 30; 20 MG/100ML; MG/100ML; MG/100ML; MG/100ML
INJECTION, SOLUTION INTRAVENOUS CONTINUOUS
Status: DISCONTINUED | OUTPATIENT
Start: 2020-01-01 | End: 2020-01-01 | Stop reason: HOSPADM

## 2020-01-01 RX ORDER — PREDNISONE 10 MG/1
30 TABLET ORAL DAILY
Qty: 15 TABLET | Refills: 0 | Status: ON HOLD | OUTPATIENT
Start: 2020-01-01 | End: 2020-01-01

## 2020-01-01 RX ORDER — ACETAMINOPHEN 325 MG/10.15ML
650 LIQUID ORAL EVERY 4 HOURS PRN
Status: CANCELLED | OUTPATIENT
Start: 2020-01-01

## 2020-01-01 RX ORDER — SULFAMETHOXAZOLE AND TRIMETHOPRIM 200; 40 MG/5ML; MG/5ML
20 SUSPENSION ORAL 2 TIMES DAILY
Status: CANCELLED | OUTPATIENT
Start: 2020-01-01

## 2020-01-01 RX ORDER — BISACODYL 10 MG
10 SUPPOSITORY, RECTAL RECTAL DAILY PRN
Status: DISCONTINUED | OUTPATIENT
Start: 2020-01-01 | End: 2020-01-01 | Stop reason: HOSPADM

## 2020-01-01 RX ORDER — FOLIC ACID 1 MG/1
TABLET ORAL
Qty: 15 TABLET | Refills: 0 | Status: SHIPPED | OUTPATIENT
Start: 2020-01-01 | End: 2020-01-01

## 2020-01-01 RX ORDER — PROCHLORPERAZINE 25 MG
25 SUPPOSITORY, RECTAL RECTAL EVERY 12 HOURS PRN
Status: DISCONTINUED | OUTPATIENT
Start: 2020-01-01 | End: 2020-01-01 | Stop reason: HOSPADM

## 2020-01-01 RX ORDER — ALBUTEROL SULFATE 90 UG/1
6 AEROSOL, METERED RESPIRATORY (INHALATION) EVERY 6 HOURS PRN
Qty: 1 INHALER | Refills: 0 | Status: SHIPPED | OUTPATIENT
Start: 2020-01-01

## 2020-01-01 RX ORDER — OXYCODONE HYDROCHLORIDE 5 MG/1
2.5 TABLET ORAL EVERY 6 HOURS PRN
Qty: 6 TABLET | Refills: 0 | Status: SHIPPED | OUTPATIENT
Start: 2020-01-01 | End: 2020-01-01

## 2020-01-01 RX ORDER — ONDANSETRON 4 MG/1
4 TABLET, ORALLY DISINTEGRATING ORAL EVERY 30 MIN PRN
Status: DISCONTINUED | OUTPATIENT
Start: 2020-01-01 | End: 2020-01-01 | Stop reason: HOSPADM

## 2020-01-01 RX ORDER — BISACODYL 10 MG
10 SUPPOSITORY, RECTAL RECTAL DAILY PRN
DISCHARGE
Start: 2020-01-01

## 2020-01-01 RX ORDER — BISACODYL 10 MG
10 SUPPOSITORY, RECTAL RECTAL DAILY PRN
Status: CANCELLED | OUTPATIENT
Start: 2020-01-01

## 2020-01-01 RX ORDER — ACETAMINOPHEN 325 MG/1
650 TABLET ORAL EVERY 4 HOURS PRN
Status: DISCONTINUED | OUTPATIENT
Start: 2020-01-01 | End: 2020-01-01 | Stop reason: CLARIF

## 2020-01-01 RX ORDER — ALBUTEROL SULFATE 90 UG/1
6 AEROSOL, METERED RESPIRATORY (INHALATION) EVERY 6 HOURS PRN
Qty: 1 INHALER | Refills: 0 | Status: SHIPPED | OUTPATIENT
Start: 2020-01-01 | End: 2020-01-01

## 2020-01-01 RX ORDER — HYDROMORPHONE HYDROCHLORIDE 1 MG/ML
.3-.5 INJECTION, SOLUTION INTRAMUSCULAR; INTRAVENOUS; SUBCUTANEOUS
Status: DISCONTINUED | OUTPATIENT
Start: 2020-01-01 | End: 2020-01-01

## 2020-01-01 RX ORDER — SULFAMETHOXAZOLE AND TRIMETHOPRIM 200; 40 MG/5ML; MG/5ML
20 SUSPENSION ORAL 2 TIMES DAILY
DISCHARGE
Start: 2020-01-01 | End: 2020-12-13

## 2020-01-01 RX ORDER — PROPOFOL 10 MG/ML
INJECTION, EMULSION INTRAVENOUS PRN
Status: DISCONTINUED | OUTPATIENT
Start: 2020-01-01 | End: 2020-01-01

## 2020-01-01 RX ORDER — MULTIPLE VITAMINS W/ MINERALS TAB 9MG-400MCG
1 TAB ORAL DAILY
COMMUNITY

## 2020-01-01 RX ORDER — ONDANSETRON 2 MG/ML
4 INJECTION INTRAMUSCULAR; INTRAVENOUS EVERY 30 MIN PRN
Status: DISCONTINUED | OUTPATIENT
Start: 2020-01-01 | End: 2020-01-01 | Stop reason: HOSPADM

## 2020-01-01 RX ORDER — ONDANSETRON 2 MG/ML
4 INJECTION INTRAMUSCULAR; INTRAVENOUS EVERY 6 HOURS PRN
Status: DISCONTINUED | OUTPATIENT
Start: 2020-01-01 | End: 2020-01-01 | Stop reason: HOSPADM

## 2020-01-01 RX ORDER — DEXTROSE MONOHYDRATE 100 MG/ML
INJECTION, SOLUTION INTRAVENOUS CONTINUOUS PRN
Status: CANCELLED | OUTPATIENT
Start: 2020-01-01

## 2020-01-01 RX ORDER — SULFAMETHOXAZOLE AND TRIMETHOPRIM 200; 40 MG/5ML; MG/5ML
20 SUSPENSION ORAL 2 TIMES DAILY
Status: DISCONTINUED | OUTPATIENT
Start: 2020-01-01 | End: 2020-01-01

## 2020-01-01 RX ADMIN — DEXAMETHASONE SODIUM PHOSPHATE 10 MG: 10 INJECTION, SOLUTION INTRAMUSCULAR; INTRAVENOUS at 18:41

## 2020-01-01 RX ADMIN — ALBUTEROL SULFATE 6 PUFF: 90 AEROSOL, METERED RESPIRATORY (INHALATION) at 17:51

## 2020-01-01 RX ADMIN — SODIUM CHLORIDE, POTASSIUM CHLORIDE, SODIUM LACTATE AND CALCIUM CHLORIDE: 600; 310; 30; 20 INJECTION, SOLUTION INTRAVENOUS at 23:01

## 2020-01-01 RX ADMIN — ENOXAPARIN SODIUM 40 MG: 40 INJECTION SUBCUTANEOUS at 16:29

## 2020-01-01 RX ADMIN — OXYCODONE HYDROCHLORIDE 5 MG: 5 SOLUTION ORAL at 01:42

## 2020-01-01 RX ADMIN — OXYCODONE HYDROCHLORIDE 5 MG: 5 SOLUTION ORAL at 21:17

## 2020-01-01 RX ADMIN — SULFAMETHOXAZOLE AND TRIMETHOPRIM 160 MG: 200; 40 SUSPENSION ORAL at 19:58

## 2020-01-01 RX ADMIN — OXYCODONE HYDROCHLORIDE 5 MG: 5 SOLUTION ORAL at 15:33

## 2020-01-01 RX ADMIN — HYDROMORPHONE HYDROCHLORIDE 0.5 MG: 1 INJECTION, SOLUTION INTRAMUSCULAR; INTRAVENOUS; SUBCUTANEOUS at 08:35

## 2020-01-01 RX ADMIN — FENTANYL CITRATE 100 MCG: 50 INJECTION, SOLUTION INTRAMUSCULAR; INTRAVENOUS at 18:41

## 2020-01-01 RX ADMIN — HYDROMORPHONE HYDROCHLORIDE 0.5 MG: 1 INJECTION, SOLUTION INTRAMUSCULAR; INTRAVENOUS; SUBCUTANEOUS at 16:29

## 2020-01-01 RX ADMIN — BARIUM SULFATE 5 ML: 400 SUSPENSION ORAL at 15:48

## 2020-01-01 RX ADMIN — MIDAZOLAM 2 MG: 1 INJECTION INTRAMUSCULAR; INTRAVENOUS at 19:33

## 2020-01-01 RX ADMIN — FENTANYL CITRATE 100 MCG: 50 INJECTION, SOLUTION INTRAMUSCULAR; INTRAVENOUS at 19:39

## 2020-01-01 RX ADMIN — HYDROMORPHONE HYDROCHLORIDE 0.5 MG: 1 INJECTION, SOLUTION INTRAMUSCULAR; INTRAVENOUS; SUBCUTANEOUS at 06:38

## 2020-01-01 RX ADMIN — SODIUM CHLORIDE 80 ML: 9 INJECTION, SOLUTION INTRAVENOUS at 14:52

## 2020-01-01 RX ADMIN — OXYCODONE HYDROCHLORIDE 5 MG: 5 SOLUTION ORAL at 07:43

## 2020-01-01 RX ADMIN — MAGNESIUM SULFATE 2 G: 2 INJECTION INTRAVENOUS at 12:27

## 2020-01-01 RX ADMIN — SODIUM CHLORIDE, SODIUM LACTATE, POTASSIUM CHLORIDE, CALCIUM CHLORIDE AND DEXTROSE MONOHYDRATE: 5; 600; 310; 30; 20 INJECTION, SOLUTION INTRAVENOUS at 05:17

## 2020-01-01 RX ADMIN — SULFAMETHOXAZOLE AND TRIMETHOPRIM 160 MG: 200; 40 SUSPENSION ORAL at 20:22

## 2020-01-01 RX ADMIN — ENOXAPARIN SODIUM 40 MG: 40 INJECTION SUBCUTANEOUS at 16:08

## 2020-01-01 RX ADMIN — PROPOFOL 50 MG: 10 INJECTION, EMULSION INTRAVENOUS at 19:23

## 2020-01-01 RX ADMIN — SODIUM CHLORIDE, POTASSIUM CHLORIDE, SODIUM LACTATE AND CALCIUM CHLORIDE: 600; 310; 30; 20 INJECTION, SOLUTION INTRAVENOUS at 20:39

## 2020-01-01 RX ADMIN — ENOXAPARIN SODIUM 40 MG: 40 INJECTION SUBCUTANEOUS at 15:52

## 2020-01-01 RX ADMIN — IOPAMIDOL 80 ML: 755 INJECTION, SOLUTION INTRAVENOUS at 14:52

## 2020-01-01 RX ADMIN — HYDROMORPHONE HYDROCHLORIDE 0.5 MG: 1 INJECTION, SOLUTION INTRAMUSCULAR; INTRAVENOUS; SUBCUTANEOUS at 16:08

## 2020-01-01 RX ADMIN — SODIUM CHLORIDE, POTASSIUM CHLORIDE, SODIUM LACTATE AND CALCIUM CHLORIDE: 600; 310; 30; 20 INJECTION, SOLUTION INTRAVENOUS at 07:04

## 2020-01-01 RX ADMIN — ENOXAPARIN SODIUM 40 MG: 40 INJECTION SUBCUTANEOUS at 15:31

## 2020-01-01 RX ADMIN — LIDOCAINE HYDROCHLORIDE 0.1 ML: 10 INJECTION, SOLUTION EPIDURAL; INFILTRATION; INTRACAUDAL; PERINEURAL at 17:07

## 2020-01-01 RX ADMIN — HYDROMORPHONE HYDROCHLORIDE 0.5 MG: 1 INJECTION, SOLUTION INTRAMUSCULAR; INTRAVENOUS; SUBCUTANEOUS at 01:24

## 2020-01-01 RX ADMIN — GLYCOPYRROLATE 0.3 MG: 0.2 INJECTION, SOLUTION INTRAMUSCULAR; INTRAVENOUS at 17:58

## 2020-01-01 RX ADMIN — SODIUM CHLORIDE 65.8 MCG: 9 INJECTION, SOLUTION INTRAVENOUS at 18:03

## 2020-01-01 RX ADMIN — SODIUM CHLORIDE, SODIUM LACTATE, POTASSIUM CHLORIDE, CALCIUM CHLORIDE AND DEXTROSE MONOHYDRATE: 5; 600; 310; 30; 20 INJECTION, SOLUTION INTRAVENOUS at 21:12

## 2020-01-01 RX ADMIN — SODIUM CHLORIDE, POTASSIUM CHLORIDE, SODIUM LACTATE AND CALCIUM CHLORIDE: 600; 310; 30; 20 INJECTION, SOLUTION INTRAVENOUS at 18:35

## 2020-01-01 RX ADMIN — HYDROMORPHONE HYDROCHLORIDE 0.5 MG: 1 INJECTION, SOLUTION INTRAMUSCULAR; INTRAVENOUS; SUBCUTANEOUS at 21:34

## 2020-01-01 RX ADMIN — SODIUM CHLORIDE, POTASSIUM CHLORIDE, SODIUM LACTATE AND CALCIUM CHLORIDE: 600; 310; 30; 20 INJECTION, SOLUTION INTRAVENOUS at 17:06

## 2020-01-01 RX ADMIN — HYDROMORPHONE HYDROCHLORIDE 0.5 MG: 1 INJECTION, SOLUTION INTRAMUSCULAR; INTRAVENOUS; SUBCUTANEOUS at 10:54

## 2020-01-01 RX ADMIN — PROPOFOL 150 MG: 10 INJECTION, EMULSION INTRAVENOUS at 18:20

## 2020-01-01 RX ADMIN — HYDROMORPHONE HYDROCHLORIDE 0.5 MG: 1 INJECTION, SOLUTION INTRAMUSCULAR; INTRAVENOUS; SUBCUTANEOUS at 11:42

## 2020-01-01 RX ADMIN — HYDROMORPHONE HYDROCHLORIDE 0.5 MG: 1 INJECTION, SOLUTION INTRAMUSCULAR; INTRAVENOUS; SUBCUTANEOUS at 23:50

## 2020-01-01 RX ADMIN — OXYCODONE HYDROCHLORIDE 5 MG: 5 SOLUTION ORAL at 19:11

## 2020-01-01 RX ADMIN — OXYCODONE HYDROCHLORIDE 5 MG: 5 SOLUTION ORAL at 20:22

## 2020-01-01 RX ADMIN — OXYCODONE HYDROCHLORIDE 5 MG: 5 SOLUTION ORAL at 19:52

## 2020-01-01 RX ADMIN — FENTANYL CITRATE 100 MCG: 50 INJECTION, SOLUTION INTRAMUSCULAR; INTRAVENOUS at 18:20

## 2020-01-01 RX ADMIN — SODIUM CHLORIDE, POTASSIUM CHLORIDE, SODIUM LACTATE AND CALCIUM CHLORIDE: 600; 310; 30; 20 INJECTION, SOLUTION INTRAVENOUS at 21:22

## 2020-01-01 RX ADMIN — HYDROMORPHONE HYDROCHLORIDE 0.5 MG: 1 INJECTION, SOLUTION INTRAMUSCULAR; INTRAVENOUS; SUBCUTANEOUS at 21:22

## 2020-01-01 RX ADMIN — HYDROMORPHONE HYDROCHLORIDE 0.5 MG: 1 INJECTION, SOLUTION INTRAMUSCULAR; INTRAVENOUS; SUBCUTANEOUS at 00:43

## 2020-01-01 RX ADMIN — MAGNESIUM HYDROXIDE 30 ML: 400 SUSPENSION ORAL at 09:01

## 2020-01-01 RX ADMIN — DOCUSATE SODIUM AND SENNOSIDES 2 TABLET: 8.6; 5 TABLET ORAL at 04:26

## 2020-01-01 RX ADMIN — HYDROMORPHONE HYDROCHLORIDE 0.5 MG: 1 INJECTION, SOLUTION INTRAMUSCULAR; INTRAVENOUS; SUBCUTANEOUS at 03:09

## 2020-01-01 RX ADMIN — HYDROMORPHONE HYDROCHLORIDE 0.5 MG: 1 INJECTION, SOLUTION INTRAMUSCULAR; INTRAVENOUS; SUBCUTANEOUS at 16:03

## 2020-01-01 RX ADMIN — ENOXAPARIN SODIUM 40 MG: 40 INJECTION SUBCUTANEOUS at 16:07

## 2020-01-01 RX ADMIN — SODIUM CHLORIDE 0.2 MCG/KG/HR: 9 INJECTION, SOLUTION INTRAVENOUS at 19:17

## 2020-01-01 RX ADMIN — SULFAMETHOXAZOLE AND TRIMETHOPRIM 160 MG: 200; 40 SUSPENSION ORAL at 07:43

## 2020-01-01 RX ADMIN — SULFAMETHOXAZOLE AND TRIMETHOPRIM 160 MG: 200; 40 SUSPENSION ORAL at 07:24

## 2020-01-01 RX ADMIN — MIDAZOLAM 2 MG: 1 INJECTION INTRAMUSCULAR; INTRAVENOUS at 17:58

## 2020-01-01 RX ADMIN — HYDROMORPHONE HYDROCHLORIDE 0.5 MG: 1 INJECTION, SOLUTION INTRAMUSCULAR; INTRAVENOUS; SUBCUTANEOUS at 08:40

## 2020-01-01 RX ADMIN — SODIUM CHLORIDE, POTASSIUM CHLORIDE, SODIUM LACTATE AND CALCIUM CHLORIDE: 600; 310; 30; 20 INJECTION, SOLUTION INTRAVENOUS at 11:03

## 2020-01-01 SDOH — HEALTH STABILITY: MENTAL HEALTH: CURRENT SMOKER: 1

## 2020-01-01 ASSESSMENT — ENCOUNTER SYMPTOMS
ABDOMINAL PAIN: 0
DYSURIA: 0
PALPITATIONS: 0
HEMATURIA: 0
NECK PAIN: 0
COLOR CHANGE: 0
HEADACHES: 0
NUMBNESS: 0
ARTHRALGIAS: 1
FEVER: 0
COUGH: 0
WHEEZING: 0
WOUND: 0
DIZZINESS: 0
BACK PAIN: 0
EYE REDNESS: 0
PHOTOPHOBIA: 0
SHORTNESS OF BREATH: 0
WEAKNESS: 0
NAUSEA: 0
EYE DISCHARGE: 0
DIARRHEA: 0
LIGHT-HEADEDNESS: 0
EYE ITCHING: 0
EYE PAIN: 0
VOMITING: 0

## 2020-01-01 ASSESSMENT — ACTIVITIES OF DAILY LIVING (ADL)
ADLS_ACUITY_SCORE: 12
ADLS_ACUITY_SCORE: 15
ADLS_ACUITY_SCORE: 14
ADLS_ACUITY_SCORE: 12
ADLS_ACUITY_SCORE: 14
ADLS_ACUITY_SCORE: 15
ADLS_ACUITY_SCORE: 14
WALKING_OR_CLIMBING_STAIRS_DIFFICULTY: NO
DOING_ERRANDS_INDEPENDENTLY_DIFFICULTY: NO
ADLS_ACUITY_SCORE: 12
TOILETING_ISSUES: NO
FALL_HISTORY_WITHIN_LAST_SIX_MONTHS: NO
DRESSING/BATHING_DIFFICULTY: NO
ADLS_ACUITY_SCORE: 12
ADLS_ACUITY_SCORE: 15
ADLS_ACUITY_SCORE: 14
ADLS_ACUITY_SCORE: 14
ADLS_ACUITY_SCORE: 12
ADLS_ACUITY_SCORE: 12
ADLS_ACUITY_SCORE: 10
ADLS_ACUITY_SCORE: 12
ADLS_ACUITY_SCORE: 12
ADLS_ACUITY_SCORE: 14
WHICH_OF_THE_ABOVE_FUNCTIONAL_RISKS_HAD_A_RECENT_ONSET_OR_CHANGE?: COMMUNICATION/SPEECH
ADLS_ACUITY_SCORE: 12
ADLS_ACUITY_SCORE: 14

## 2020-01-01 ASSESSMENT — MIFFLIN-ST. JEOR
SCORE: 1171
SCORE: 1177.77
SCORE: 1227
SCORE: 1229
SCORE: 1209
SCORE: 1173.24
SCORE: 1186.85
SCORE: 1230
SCORE: 1224.04
SCORE: 1226
SCORE: 1186.85

## 2020-01-01 ASSESSMENT — PATIENT HEALTH QUESTIONNAIRE - PHQ9: SUM OF ALL RESPONSES TO PHQ QUESTIONS 1-9: 0

## 2020-01-01 ASSESSMENT — LIFESTYLE VARIABLES: TOBACCO_USE: 1

## 2020-02-10 NOTE — ED PROVIDER NOTES
History     Chief Complaint   Patient presents with     Leg Pain     HPI  Karen Tee is a 64 year old female with past medical history significant for alcoholic cirrhosis, portal hypertension, history of ascites, depression, hyperlipidemia presents to the emergency department with concern over left lower leg pain after she sustained a fall 2 days prior to arrival per her report.  Patient reports that she sustained a fall on the ice in the parking lot of her apartment building on Saturday.   She was unable to arise on her own and required help with several friends.  She reports that she has not been able to ambulate on the leg for the last 2 days due to discomfort and has been crawling in her apartment as needed.  She does complain of some swelling, possible ecchymosis on the proximal left lower leg.   She denies any distal numbness or paresthesias.  She denies any other significant areas of pain as a result of her fall.  She denies hitting her head.  She denies any recent headache, dizziness, lightheadedness, cough, chest pain, dyspnea, wheezing, nausea, vomiting, diarrhea, abdominal pain.  She did attempt to treat with 400 mg of ibuprofen yesterday without relief.  She does report that her last use of ETOH was four days prior to arrival per her report.      Allergies:  No Known Allergies    Problem List:    Patient Active Problem List    Diagnosis Date Noted     Hyponatremia 06/12/2018     Priority: Medium     Abnormal LFTs 06/12/2018     Priority: Medium     Elevated INR 06/12/2018     Priority: Medium     Leukocytosis 06/12/2018     Priority: Medium     Cirrhosis of liver with ascites, unspecified hepatic cirrhosis type (H) 06/11/2018     Priority: Medium     Alcohol dependence (H) 03/09/2012     Priority: Medium     Quit in 2010 but started drinking again.       HYPERLIPIDEMIA LDL GOAL <100 10/31/2010     Priority: Medium     Ascites 08/23/2010     Priority: Medium     Takes Oxycodone, 5mg as needed every  six hours; started at [facility], --10. Using about one per day.        Portal hypertension (H) 2010     Priority: Medium     Inflammatory disease of breast 10/31/2007     Priority: Medium     S/p excision of breast lesion, no problems since       Hyperlipidemia 10/25/2007     Priority: Medium     Problem list name updated by automated process. Provider to review       Elevated blood pressure reading without diagnosis of hypertension 2007     Priority: Medium     Tobacco use disorder 2007     Priority: Medium     Major depressive disorder, single episode, mild (H) 10/19/2006     Priority: Medium     Chronic depressive personality disorder 2006     Priority: Medium        Past Medical History:    Past Medical History:   Diagnosis Date     Alcohol dependence (H)      Chronic depressive personality disorder      Cirrhoses, liver      Tobacco dependence        Past Surgical History:    Past Surgical History:   Procedure Laterality Date     HC EXCISION BREAST LESION, OPEN >=1         Family History:    Family History   Problem Relation Age of Onset     Hypertension Mother      Depression Sister         and Anxiety.     Psychotic Disorder Sister         severe anxiety     Neurologic Disorder Brother          from brain tumor.       Social History:  Marital Status:   [2]  Social History     Tobacco Use     Smoking status: Current Every Day Smoker     Packs/day: 0.50     Types: Cigarettes     Smokeless tobacco: Never Used   Substance Use Topics     Alcohol use: No     Comment: pt. has not had any alcohol since hospitalization 2018     Drug use: No     Comment: hx of alcohol abuse.        Medications:    folic acid (FOLVITE) 1 MG tablet  furosemide (LASIX) 40 MG tablet  spironolactone (ALDACTONE) 25 MG tablet      Review of Systems   Constitutional: Negative for fever.   Eyes: Negative for photophobia, pain, discharge, redness, itching and visual disturbance.    Respiratory: Negative for cough, shortness of breath and wheezing.    Cardiovascular: Negative for chest pain and palpitations.   Gastrointestinal: Negative for abdominal pain, diarrhea, nausea and vomiting.   Genitourinary: Negative for dysuria and hematuria.   Musculoskeletal: Positive for arthralgias (left knee/lower leg). Negative for back pain and neck pain.   Skin: Negative for color change, rash and wound.   Neurological: Negative for dizziness, syncope, weakness, light-headedness, numbness and headaches.   All other systems reviewed and are negative.    Physical Exam   BP: (!) 147/81  Pulse: 118  Temp: 98.7  F (37.1  C)  Resp: 18  SpO2: 99 %  Physical Exam  GENERAL APPEARANCE: chronically ill appearing, alert  EYES: EOMI,  PERRL, conjunctiva clear  HENT: no evidence of external trauma, ear canals and TM's normal.  Nose and mouth without ulcers, erythema or lesions  NECK: supple, nontender, no lymphadenopathy, normal spontaneous movement  RESP: lungs clear to auscultation - no rales, rhonchi or wheezes  CV: regular rates and rhythm, normal S1 S2, no murmur noted  ABDOMEN:  Protuberant, distended, non-tender, no guarding, rigidity  NEURO: sensation to light touch of left foot and ankle grossly intact  MS:  POSITIVE for tenderness to palpation along proximal and lateral left lower leg which is exacerbated by flexion/extension at the ankle, and knee no focal tenderness to palpation overlying shin, no significant swelling, ecchymosis  SKIN: multiple small superficial abrasions to lower legs bilaterally  ED Course        Procedures             EKG Interpretation:      Interpreted by Edwige Langston PA-C and Dr. Paul Massey   Symptoms at time of EKG: leg pain after fall  Rhythm: Normal sinus   Rate: 106  Axis: Normal  Ectopy: None  Conduction: Normal  ST Segments/ T Waves: No ST-T wave changes and No acute ischemic changes  Q Waves: None  Comparison to prior: Unchanged from 11/1/07    Clinical Impression: no  acute changes    Critical Care time:  none          Results for orders placed or performed during the hospital encounter of 02/10/20 (from the past 24 hour(s))   CBC with platelets differential   Result Value Ref Range    WBC 12.4 (H) 4.0 - 11.0 10e9/L    RBC Count 4.15 3.8 - 5.2 10e12/L    Hemoglobin 13.3 11.7 - 15.7 g/dL    Hematocrit 39.2 35.0 - 47.0 %    MCV 95 78 - 100 fl    MCH 32.0 26.5 - 33.0 pg    MCHC 33.9 31.5 - 36.5 g/dL    RDW 14.8 10.0 - 15.0 %    Platelet Count 333 150 - 450 10e9/L    Diff Method Automated Method     % Neutrophils 77.0 %    % Lymphocytes 14.7 %    % Monocytes 6.5 %    % Eosinophils 0.1 %    % Basophils 1.2 %    % Immature Granulocytes 0.5 %    Nucleated RBCs 0 0 /100    Absolute Neutrophil 9.6 (H) 1.6 - 8.3 10e9/L    Absolute Lymphocytes 1.8 0.8 - 5.3 10e9/L    Absolute Monocytes 0.8 0.0 - 1.3 10e9/L    Absolute Eosinophils 0.0 0.0 - 0.7 10e9/L    Absolute Basophils 0.2 0.0 - 0.2 10e9/L    Abs Immature Granulocytes 0.1 0 - 0.4 10e9/L    Absolute Nucleated RBC 0.0    Comprehensive metabolic panel   Result Value Ref Range    Sodium 130 (L) 133 - 144 mmol/L    Potassium 3.3 (L) 3.4 - 5.3 mmol/L    Chloride 98 94 - 109 mmol/L    Carbon Dioxide 20 20 - 32 mmol/L    Anion Gap 12 3 - 14 mmol/L    Glucose 107 (H) 70 - 99 mg/dL    Urea Nitrogen 7 7 - 30 mg/dL    Creatinine 0.62 0.52 - 1.04 mg/dL    GFR Estimate >90 >60 mL/min/[1.73_m2]    GFR Estimate If Black >90 >60 mL/min/[1.73_m2]    Calcium 8.4 (L) 8.5 - 10.1 mg/dL    Bilirubin Total 0.7 0.2 - 1.3 mg/dL    Albumin 2.8 (L) 3.4 - 5.0 g/dL    Protein Total 8.3 6.8 - 8.8 g/dL    Alkaline Phosphatase 241 (H) 40 - 150 U/L    ALT 23 0 - 50 U/L     (H) 0 - 45 U/L   Ammonia   Result Value Ref Range    Ammonia 10 10 - 50 umol/L   Magnesium   Result Value Ref Range    Magnesium 2.1 1.6 - 2.3 mg/dL   INR   Result Value Ref Range    INR 1.34 (H) 0.86 - 1.14   PTT   Result Value Ref Range    PTT 37 22 - 37 sec   XR Tibia & Fibula Left 2 Views     Narrative    TIBIA AND FIBULA LEFT TWO VIEWS  February 10, 2020 12:34 PM     HISTORY: Pain after fall.      Impression    IMPRESSION: Fracture of the fibular neck region with 4 mm of  displacement.    JIMMY JANSEN MD     Medications - No data to display    Assessments & Plan (with Medical Decision Making)     I have reviewed the nursing notes.    I have reviewed the findings, diagnosis, plan and need for follow up with the patient.       Discharge Medication List as of 2/10/2020  1:21 PM        Final diagnoses:   Closed fracture of proximal fibula     64-year-old female presents to the emergency department with concern over left lower leg pain after she sustained a fall 2 days prior to arrival.  She had elevated blood pressure upon arrival, remainder of vital signs within normal limits.  Physical exam findings as described above.  As part of evaluation patient did have extensive laboratory testing given prior history as well as X-ray that showed a 4 mm displaced fracture of the proximal fibular neck.  I discussed case with orthopedics on-call Dr. Owens recommended crutches/walker, weightbearing as tolerated, no other immobilization needed at this time.  Patient was offered a walker however refused later stating she had 1 at home.  She also declined any other further evaluation or treatment.  She was instructed to follow-up with PCP or sports medicine for recheck within the next several days,  referral placed. Worrisome reasons to ER sooner discussed.    Disclaimer: This note consists of symbols derived from keyboarding, dictation, and/or voice recognition software. As a result, there may be errors in the script that have gone undetected.  Please consider this when interpreting information found in the chart.      2/10/2020   [facility] EMERGENCY DEPARTMENT     Edwige Langston PA-C  02/10/20 2059

## 2020-02-10 NOTE — ED TRIAGE NOTES
"biba from home, lives alone  States family comes to help her when needed, states she has \"no idea\" how often that is  Fall on Saturday, slipped on the ice  Denies head injury  Increasing pain to left lower leg with weight-bearing, states pain is better today but cannot walk due to pain  Declined pain meds en route  Liver cirrhosis, drinks daily, last ETOH use today    "

## 2020-02-10 NOTE — ED NOTES
Bed: ED17  Expected date:   Expected time:   Means of arrival:   Comments:  Ambulance  Fall  Leg injury

## 2020-02-10 NOTE — ED AVS SNAPSHOT
[facility] Emergency Department  [address] [address]  Phone:  [phone]  Fax:  [phone]                                    Karen Tee   MRN: [id number]    Department:  [facility] Emergency Department   Date of Visit:  2/10/2020           After Visit Summary Signature Page    I have received my discharge instructions, and my questions have been answered. I have discussed any challenges I see with this plan with the nurse or doctor.    ..........................................................................................................................................  Patient/Patient Representative Signature      ..........................................................................................................................................  Patient Representative Print Name and Relationship to Patient    ..................................................               ................................................  Date                                   Time    ..........................................................................................................................................  Reviewed by Signature/Title    ...................................................              ..............................................  Date                                               Time          22EPIC Rev 08/18

## 2020-02-18 NOTE — PROGRESS NOTES
"Karen Tee  :  1955  DOS: 2020  MRN: [id number]    Sports Medicine Clinic Visit    PCP: Tyler Khalil    Karen Tee is a 64 year old female who is seen as an ER referral presenting with left proximal fibula fracture.    Injury: Fell in parking lot, landing with left leg underneath her ~ 10 days ago (20).  Pain located over left lateral proximal lower leg, fibular head region, radiating to anterior tibia.  Additional Features:  Positive: swelling, weakness and unable full weight bear on left.  Symptoms are better with Ibuprofen and Rest.  Symptoms are worse with: any weight bearing on left leg.  Other evaluation and/or treatments so far consists of: Ibuprofen, Rest and compression wrap, ER visit.  Recent imaging completed: X-rays completed 20.  Prior History of related problems: none    Social History: retired    Review of Systems  Musculoskeletal: as above  Remainder of review of systems is negative including constitutional, CV, pulmonary, GI, Skin and Neurologic except as noted in HPI or medical history.    Past Medical History:   Diagnosis Date     Alcohol dependence (H)      Chronic depressive personality disorder      Cirrhoses, liver     diagnosed 7/10     Tobacco dependence      Past Surgical History:   Procedure Laterality Date     HC EXCISION BREAST LESION, OPEN >=1       Family History   Problem Relation Age of Onset     Hypertension Mother      Depression Sister         and Anxiety.     Psychotic Disorder Sister         severe anxiety     Neurologic Disorder Brother          from brain tumor.       Objective  BP (!) 179/88   Ht 1.6 m (5' 3\")   Wt 64.4 kg (142 lb)   LMP 2007   BMI 25.15 kg/m        General: healthy, alert and in no distress      HEENT: no scleral icterus or conjunctival erythema     Skin: no suspicious lesions or rash. No jaundice.     CV: regular rhythm by palpation, 2+ distal pulses, no pedal edema      Resp: normal respiratory effort " without conversational dyspnea     Psych: normal mood and affect      Gait: antalgic, baseline coordination and balance     Neuro: normal light touch sensory exam of the extremities. Motor strength as noted below     Left Knee exam    ROM:        Mildly decreased active and passive ROM with flexion and extension    Inspection:       no visible ecchymosis        effusion noted trace      Atrophic/very thin lower legs bilaterally    Skin:       no visible deformities       well perfused       capillary refill brisk    Patellar Motion:        Normal patellar tracking noted through range of motion    Tender:        Fibular head, moderately in the lower leg along the syndesmosis    Non Tender:         remainder of knee area        medial patellar border        lateral patellar border        medial joint line        along MCL        infrapatellar tendon        tibial tubercle       pes anserine bursa    Special Tests:   Painful external rotation of the lower leg    Evaluation of ipsilateral kinetic chain       Baseline strength with hip extension and abduction      Radiology  Results for orders placed or performed during the hospital encounter of 02/10/20   XR Tibia & Fibula Left 2 Views    Narrative    TIBIA AND FIBULA LEFT TWO VIEWS  February 10, 2020 12:34 PM     HISTORY: Pain after fall.      Impression    IMPRESSION: Fracture of the fibular neck region with 4 mm of  displacement.    JIMMY JANSEN MD       Assessment:  1. Other closed fracture of proximal end of left fibula with routine healing, subsequent encounter    2. Injury of left lower leg, subsequent encounter        Plan:  Discussed the assessment with the patient.  Follow up: 1-2 weeks  Overall reassuring clinically that her pain is improving  Advised weightbearing as tolerated  Compression options reviewed, Tubigrip provided today over knee and lower leg which felt much better  Protected weightbearing, likely nonweightbearing until next visit, will base this  on pain  Assistive device use options and strategies reviewed  XR images independently visualized and reviewed with patient today in clinic  No change in position or alignment of fracture  Reviewed briefly with orthopedics who agreed with conservative care  Likely syndesmosis injury connected to this fracture, reviewed in detail  Plan for physical therapy once improving  Pain management strategies reviewed  Home handouts provided and supportive care reviewed  All questions were answered today  Contact us with additional questions or concerns  Signs and sx of concern reviewed      Alfonso Banuelos DO, CAARCHANA  Primary Care Sports Medicine  [facility]                 Disclaimer: This note consists of symbols derived from keyboarding, dictation and/or voice recognition software. As a result, there may be errors in the script that have gone undetected. Please consider this when interpreting information found in this chart.

## 2020-02-18 NOTE — LETTER
"    2020         RE: Karen Tee  [address]  [address]  [place] [address]        Dear Colleague,    Thank you for referring your patient, Karen Tee, to the [facility]. Please see a copy of my visit note below.    Karen Tee  :  1955  DOS: 2020  MRN: [id number]    Sports Medicine Clinic Visit    PCP: Tyler Khalil    Karen Tee is a 64 year old female who is seen as an ER referral presenting with left proximal fibula fracture.    Injury: Fell in parking lot, landing with left leg underneath her ~ 10 days ago (20).  Pain located over left lateral proximal lower leg, fibular head region, radiating to anterior tibia.  Additional Features:  Positive: swelling, weakness and unable full weight bear on left.  Symptoms are better with Ibuprofen and Rest.  Symptoms are worse with: any weight bearing on left leg.  Other evaluation and/or treatments so far consists of: Ibuprofen, Rest and compression wrap, ER visit.  Recent imaging completed: X-rays completed 20.  Prior History of related problems: none    Social History: retired    Review of Systems  Musculoskeletal: as above  Remainder of review of systems is negative including constitutional, CV, pulmonary, GI, Skin and Neurologic except as noted in HPI or medical history.    Past Medical History:   Diagnosis Date     Alcohol dependence (H)      Chronic depressive personality disorder      Cirrhoses, liver     diagnosed 7/10     Tobacco dependence      Past Surgical History:   Procedure Laterality Date     HC EXCISION BREAST LESION, OPEN >=1       Family History   Problem Relation Age of Onset     Hypertension Mother      Depression Sister         and Anxiety.     Psychotic Disorder Sister         severe anxiety     Neurologic Disorder Brother          from brain tumor.       Objective  BP (!) 179/88   Ht 1.6 m (5' 3\")   Wt 64.4 kg (142 lb)   LMP 2007   BMI 25.15 " kg/m         General: healthy, alert and in no distress      HEENT: no scleral icterus or conjunctival erythema     Skin: no suspicious lesions or rash. No jaundice.     CV: regular rhythm by palpation, 2+ distal pulses, no pedal edema      Resp: normal respiratory effort without conversational dyspnea     Psych: normal mood and affect      Gait: antalgic, baseline coordination and balance     Neuro: normal light touch sensory exam of the extremities. Motor strength as noted below     Left Knee exam    ROM:        Mildly decreased active and passive ROM with flexion and extension    Inspection:       no visible ecchymosis        effusion noted trace      Atrophic/very thin lower legs bilaterally    Skin:       no visible deformities       well perfused       capillary refill brisk    Patellar Motion:        Normal patellar tracking noted through range of motion    Tender:        Fibular head, moderately in the lower leg along the syndesmosis    Non Tender:         remainder of knee area        medial patellar border        lateral patellar border        medial joint line        along MCL        infrapatellar tendon        tibial tubercle       pes anserine bursa    Special Tests:   Painful external rotation of the lower leg    Evaluation of ipsilateral kinetic chain       Baseline strength with hip extension and abduction      Radiology  Results for orders placed or performed during the hospital encounter of 02/10/20   XR Tibia & Fibula Left 2 Views    Narrative    TIBIA AND FIBULA LEFT TWO VIEWS  February 10, 2020 12:34 PM     HISTORY: Pain after fall.      Impression    IMPRESSION: Fracture of the fibular neck region with 4 mm of  displacement.    JIMMY JANSEN MD       Assessment:  1. Other closed fracture of proximal end of left fibula with routine healing, subsequent encounter    2. Injury of left lower leg, subsequent encounter        Plan:  Discussed the assessment with the patient.  Follow up: 1-2  weeks  Overall reassuring clinically that her pain is improving  Advised weightbearing as tolerated  Compression options reviewed, Tubigrip provided today over knee and lower leg which felt much better  Protected weightbearing, likely nonweightbearing until next visit, will base this on pain  Assistive device use options and strategies reviewed  XR images independently visualized and reviewed with patient today in clinic  No change in position or alignment of fracture  Reviewed briefly with orthopedics who agreed with conservative care  Likely syndesmosis injury connected to this fracture, reviewed in detail  Plan for physical therapy once improving  Pain management strategies reviewed  Home handouts provided and supportive care reviewed  All questions were answered today  Contact us with additional questions or concerns  Signs and sx of concern reviewed      Alfonso Banuelos DO, CAQ  Primary Care Sports Medicine  [facility]                 Disclaimer: This note consists of symbols derived from keyboarding, dictation and/or voice recognition software. As a result, there may be errors in the script that have gone undetected. Please consider this when interpreting information found in this chart.    Again, thank you for allowing me to participate in the care of your patient.        Sincerely,        Alfonso Banuelos DO

## 2020-02-28 NOTE — TELEPHONE ENCOUNTER
"Requested Prescriptions   Pending Prescriptions Disp Refills     folic acid (FOLVITE) 1 MG tablet [Pharmacy Med Name: FOLIC ACID 1 MG TABLET] 90 tablet 3     Sig: TAKE 1 TABLET BY MOUTH EVERY DAY  Last Written Prescription Date:  5/29/19  Last Fill Quantity: 30,  # refills: 10   Last Office Visit with [facility] or [facility] prescribing provider:  7/6/18   Future Office Visit:    Next 5 appointments (look out 90 days)    Mar 10, 2020 10:00 AM CDT  Return Visit with Alfonso Banuelos DO  [facility] [address]  [place] [facility] [place] [address]  [phone]                Vitamin Supplements (Adult) Protocol Failed - 2/28/2020  2:00 AM        Failed - Recent (12 mo) or future (30 days) visit within the authorizing provider's specialty     Patient has had an office visit with the authorizing provider or a provider within the authorizing providers department within the previous 12 mos or has a future within next 30 days. See \"Patient Info\" tab in inbasket, or \"Choose Columns\" in Meds & Orders section of the refill encounter.              Passed - High dose Vitamin D not ordered        Passed - Medication is active on med list        spironolactone (ALDACTONE) 25 MG tablet [Pharmacy Med Name: SPIRONOLACTONE 25 MG TABLET]  Last Written Prescription Date:  5/29/19  Last Fill Quantity: 30,  # refills: 10   Last Office Visit with [facility], [facility] or [facility] prescribing provider:  7/6/18   Future Office Visit:    Next 5 appointments (look out 90 days)    Mar 10, 2020 10:00 AM CDT  Return Visit with Alfonso Banuelos DO  [facility] [address]  [address]  [place] [address]  [phone]          90 tablet 3     Sig: TAKE 1 TABLET BY MOUTH EVERY DAY       Diuretics (Including Combos) Protocol Failed - 2/28/2020  2:00 AM        Failed - Blood pressure under 140/90 in past 12 months     BP " "Readings from Last 3 Encounters:   02/18/20 (!) 179/88   02/10/20 102/87   07/06/18 112/70                 Failed - Recent (12 mo) or future (30 days) visit within the authorizing provider's specialty     Patient has had an office visit with the authorizing provider or a provider within the authorizing providers department within the previous 12 mos or has a future within next 30 days. See \"Patient Info\" tab in inbasket, or \"Choose Columns\" in Meds & Orders section of the refill encounter.              Failed - Normal serum potassium on file in past 12 months     Recent Labs   Lab Test 02/10/20  1100   POTASSIUM 3.3*                    Failed - Normal serum sodium on file in past 12 months     Recent Labs   Lab Test 02/10/20  1100   *              Passed - Medication is active on med list        Passed - Patient is age 18 or older        Passed - No active pregancy on record        Passed - Normal serum creatinine on file in past 12 months     Recent Labs   Lab Test 02/10/20  1100   CR 0.62              Passed - No positive pregnancy test in past 12 months          "

## 2020-03-02 NOTE — TELEPHONE ENCOUNTER
Routing refill request to provider for review/approval because:  Nida given x1 and patient did not follow up, please advise  Patient needs to be seen because it has been more than 1 year since last office visit.    LOV 07/06/18 with Dr. Khalil.      Lubna WELLS BSN, RN

## 2020-03-10 NOTE — PROGRESS NOTES
Karen Tee  :  1955  DOS: 03/10/20  MRN: [id number]    Sports Medicine Clinic Visit    PCP: Tyler Khalil    Karen Tee is a 64 year old female who is seen as an ER referral presenting with left proximal fibula fracture.    Injury: Fell in parking lot, landing with left leg underneath her ~ 10 days ago (20).  Pain located over left lateral proximal lower leg, fibular head region, radiating to anterior tibia.  Additional Features:  Positive: swelling, weakness and unable full weight bear on left.  Symptoms are better with Ibuprofen and Rest.  Symptoms are worse with: any weight bearing on left leg.  Other evaluation and/or treatments so far consists of: Ibuprofen, Rest and compression wrap, ER visit.  Recent imaging completed: X-rays completed 20.  Prior History of related problems: none    Social History: retired    Interim History March 10, 2020  Karen Tee is now 4 weeks out from injury.  Since last visit on 2020 patient repeat radiograph taken prior to seeing the patient.  Patient notes knee, proximal upper leg pain has improved, but now having swelling and pain over lateral ankle that started ~ 3 - 5 days ago after grocery shopping.    Review of Systems  Musculoskeletal: as above  Remainder of review of systems is negative including constitutional, CV, pulmonary, GI, Skin and Neurologic except as noted in HPI or medical history.    Past Medical History:   Diagnosis Date     Alcohol dependence (H)      Chronic depressive personality disorder      Cirrhoses, liver     diagnosed 7/10     Tobacco dependence      Past Surgical History:   Procedure Laterality Date     HC EXCISION BREAST LESION, OPEN >=1       Family History   Problem Relation Age of Onset     Hypertension Mother      Depression Sister         and Anxiety.     Psychotic Disorder Sister         severe anxiety     Neurologic Disorder Brother          from brain tumor.       Objective  BP (!) 144/77   Ht  "1.6 m (5' 3\")   Wt 64.9 kg (143 lb)   LMP 05/19/2007   BMI 25.33 kg/m        General: healthy, alert and in no distress      HEENT: no scleral icterus or conjunctival erythema     Skin: no suspicious lesions or rash. No jaundice.     CV: regular rhythm by palpation, 2+ distal pulses, no pedal edema      Resp: normal respiratory effort without conversational dyspnea     Psych: normal mood and affect      Gait: antalgic, baseline coordination and balance     Neuro: normal light touch sensory exam of the extremities. Motor strength as noted below     Left Knee exam    ROM:        Mildly decreased active and passive ROM with flexion and extension    Inspection:       no visible ecchymosis        effusion noted trace      Atrophic/very thin lower legs bilaterally    Skin:       no visible deformities       well perfused       capillary refill brisk    Patellar Motion:        Normal patellar tracking noted through range of motion    Tender:        Fibular head, significantly improved, moderately in the lower leg along the syndesmosis, most focally over lateral ankle today, distal tib-fib joint    Non Tender:         remainder of knee area        medial patellar border        lateral patellar border        medial joint line        along MCL        infrapatellar tendon        tibial tubercle       pes anserine bursa    Special Tests:   Painful external rotation of the lower leg    Evaluation of ipsilateral kinetic chain       Baseline strength with hip extension and abduction      Radiology  Results for orders placed or performed during the hospital encounter of 02/10/20   XR Tibia & Fibula Left 2 Views    Narrative    TIBIA AND FIBULA LEFT TWO VIEWS  February 10, 2020 12:34 PM     HISTORY: Pain after fall.      Impression    IMPRESSION: Fracture of the fibular neck region with 4 mm of  displacement.    JIMMY JANSEN MD       Assessment:  1. Other closed fracture of proximal end of left fibula with routine healing, " subsequent encounter    2. Injury of left lower leg, subsequent encounter        Plan:  Discussed the assessment with the patient.  Follow up: 4 weeks  Overall reassuring clinically that her pain is improving from fracture  Advised continued progressive weightbearing as tolerated  Compression options reviewed, Tubigrip provided over knee and lower leg which felt much better  Trilok brace provided today for ankle sx, flared from ramping activity too quickly  Reviewed again the likely relationship of shin and lower leg pain to likely twisting injury to syndesmosis  Assistive device use options and strategies reviewed  XR images independently visualized and reviewed with patient today in clinic  No change in position or alignment of fracture, callus formation noted, consistent with her clinical progress  Start PT  Pain management strategies reviewed  Supportive care reviewed  All questions were answered today  Contact us with additional questions or concerns  Signs and sx of concern reviewed      Alfonso Banuelos DO, SELENA  Primary Care Sports Medicine  [facility]                 Disclaimer: This note consists of symbols derived from keyboarding, dictation and/or voice recognition software. As a result, there may be errors in the script that have gone undetected. Please consider this when interpreting information found in this chart.

## 2020-03-10 NOTE — LETTER
3/10/2020         RE: Karen Tee  [address]  [address]  [place] [address]        Dear Colleague,    Thank you for referring your patient, Karen Tee, to the [facility]. Please see a copy of my visit note below.    Karen Tee  :  1955  DOS: 03/10/20  MRN: [id number]    Sports Medicine Clinic Visit    PCP: Tyler Khalil    Karen Tee is a 64 year old female who is seen as an ER referral presenting with left proximal fibula fracture.    Injury: Fell in parking lot, landing with left leg underneath her ~ 10 days ago (20).  Pain located over left lateral proximal lower leg, fibular head region, radiating to anterior tibia.  Additional Features:  Positive: swelling, weakness and unable full weight bear on left.  Symptoms are better with Ibuprofen and Rest.  Symptoms are worse with: any weight bearing on left leg.  Other evaluation and/or treatments so far consists of: Ibuprofen, Rest and compression wrap, ER visit.  Recent imaging completed: X-rays completed 20.  Prior History of related problems: none    Social History: retired    Interim History March 10, 2020  Karen Tee is now 4 weeks out from injury.  Since last visit on 2020 patient repeat radiograph taken prior to seeing the patient.  Patient notes knee, proximal upper leg pain has improved, but now having swelling and pain over lateral ankle that started ~ 3 - 5 days ago after grocery shopping.    Review of Systems  Musculoskeletal: as above  Remainder of review of systems is negative including constitutional, CV, pulmonary, GI, Skin and Neurologic except as noted in HPI or medical history.    Past Medical History:   Diagnosis Date     Alcohol dependence (H)      Chronic depressive personality disorder      Cirrhoses, liver     diagnosed 7/10     Tobacco dependence      Past Surgical History:   Procedure Laterality Date     HC EXCISION BREAST LESION, OPEN >=1    "    Family History   Problem Relation Age of Onset     Hypertension Mother      Depression Sister         and Anxiety.     Psychotic Disorder Sister         severe anxiety     Neurologic Disorder Brother          from brain tumor.       Objective  BP (!) 144/77   Ht 1.6 m (5' 3\")   Wt 64.9 kg (143 lb)   LMP 2007   BMI 25.33 kg/m        General: healthy, alert and in no distress      HEENT: no scleral icterus or conjunctival erythema     Skin: no suspicious lesions or rash. No jaundice.     CV: regular rhythm by palpation, 2+ distal pulses, no pedal edema      Resp: normal respiratory effort without conversational dyspnea     Psych: normal mood and affect      Gait: antalgic, baseline coordination and balance     Neuro: normal light touch sensory exam of the extremities. Motor strength as noted below     Left Knee exam    ROM:        Mildly decreased active and passive ROM with flexion and extension    Inspection:       no visible ecchymosis        effusion noted trace      Atrophic/very thin lower legs bilaterally    Skin:       no visible deformities       well perfused       capillary refill brisk    Patellar Motion:        Normal patellar tracking noted through range of motion    Tender:        Fibular head, significantly improved, moderately in the lower leg along the syndesmosis, most focally over lateral ankle today, distal tib-fib joint    Non Tender:         remainder of knee area        medial patellar border        lateral patellar border        medial joint line        along MCL        infrapatellar tendon        tibial tubercle       pes anserine bursa    Special Tests:   Painful external rotation of the lower leg    Evaluation of ipsilateral kinetic chain       Baseline strength with hip extension and abduction      Radiology  Results for orders placed or performed during the hospital encounter of 02/10/20   XR Tibia & Fibula Left 2 Views    Narrative    TIBIA AND FIBULA LEFT TWO VIEWS  " February 10, 2020 12:34 PM     HISTORY: Pain after fall.      Impression    IMPRESSION: Fracture of the fibular neck region with 4 mm of  displacement.    JIMMY JANSEN MD       Assessment:  1. Other closed fracture of proximal end of left fibula with routine healing, subsequent encounter    2. Injury of left lower leg, subsequent encounter        Plan:  Discussed the assessment with the patient.  Follow up: 4 weeks  Overall reassuring clinically that her pain is improving from fracture  Advised continued progressive weightbearing as tolerated  Compression options reviewed, Tubigrip provided over knee and lower leg which felt much better  Trilok brace provided today for ankle sx, flared from ramping activity too quickly  Reviewed again the likely relationship of shin and lower leg pain to likely twisting injury to syndesmosis  Assistive device use options and strategies reviewed  XR images independently visualized and reviewed with patient today in clinic  No change in position or alignment of fracture, callus formation noted, consistent with her clinical progress  Start PT  Pain management strategies reviewed  Supportive care reviewed  All questions were answered today  Contact us with additional questions or concerns  Signs and sx of concern reviewed      Alfonso Banuelos DO, CAQ  Primary Care Sports Medicine  [facility]                 Disclaimer: This note consists of symbols derived from keyboarding, dictation and/or voice recognition software. As a result, there may be errors in the script that have gone undetected. Please consider this when interpreting information found in this chart.    Again, thank you for allowing me to participate in the care of your patient.        Sincerely,        Alfonso Banuelos DO

## 2020-07-13 NOTE — PLAN OF CARE
Problem: Patient Care Overview  Goal: Plan of Care/Patient Progress Review  Outcome: Improving  VSS although BP continues to run low- remains on diuretics.  CIWA scores 0.  Patient c/o weakness, is not enthusiastic about getting out of bed- did get up to the chair for a short time with much encouragement.  Denies pain.  Paracentesis bandaid/site CDI.  Has voided x1 this shift so far but good amt (400 mL).  Appetite fair to good with patient eating approx. 50% of her meals.  Lungs clear/diminished.  Will continue to monitor.       Pt called the service and spoke with a .with call center,  Pt is early pregnant and is experiencing cramping and spotting.  Pt was instructed to go to ER.  Pt did not go as she is afraid of COVID.  Pt informed we do not have an ultrasound tech here today and when you go to the ER at [facility] they will screen you and get you safe from other, I reassured Belkis they will triage her and with pregnancy issues she will be provided with proper care.  Pt agreeable to go to ER.

## 2020-11-04 NOTE — ED PROVIDER NOTES
History     Chief Complaint   Patient presents with     Cough     Otalgia     HPI    SUBJECTIVE: Karen Tee  is here today because of:Ear Pain and shortness of breath, cough, decreased appetite, chest congestion, swollen glands.  Onset of symptoms was 2 weeks ago. Course of illness is worsening.  Patient denies exposure to illness at home.   Patient denies fever, sore throat, nasal congestion/runny nose, vomiting, diarrhea, dysuria, and mattering conjunctivitis   Treatment measures tried include acetaminophen.  Patient is exposed to tobacco  Last ETOH was last night, daily usage is 2-5 Keely- petersen daily    Allergies:  No Known Allergies    Problem List:    Patient Active Problem List    Diagnosis Date Noted     Hyponatremia 06/12/2018     Priority: Medium     Abnormal LFTs 06/12/2018     Priority: Medium     Elevated INR 06/12/2018     Priority: Medium     Leukocytosis 06/12/2018     Priority: Medium     Cirrhosis of liver with ascites, unspecified hepatic cirrhosis type (H) 06/11/2018     Priority: Medium     Alcohol dependence (H) 03/09/2012     Priority: Medium     Quit in 2010 but started drinking again.       HYPERLIPIDEMIA LDL GOAL <100 10/31/2010     Priority: Medium     Ascites 08/23/2010     Priority: Medium     Takes Oxycodone, 5mg as needed every six hours; started at [facility], 8-19-10. Using about one per day.        Portal hypertension (H) 08/23/2010     Priority: Medium     Inflammatory disease of breast 10/31/2007     Priority: Medium     S/p excision of breast lesion, no problems since       Hyperlipidemia 10/25/2007     Priority: Medium     Problem list name updated by automated process. Provider to review       Elevated blood pressure reading without diagnosis of hypertension 09/25/2007     Priority: Medium     Tobacco use disorder 09/25/2007     Priority: Medium     Major depressive disorder, single episode, mild (H) 10/19/2006     Priority: Medium     Chronic depressive  "personality disorder 2006     Priority: Medium        Past Medical History:    Past Medical History:   Diagnosis Date     Alcohol dependence (H)      Chronic depressive personality disorder      Cirrhoses, liver      Tobacco dependence        Past Surgical History:    Past Surgical History:   Procedure Laterality Date     HC EXCISION BREAST LESION, OPEN >=1         Family History:    Family History   Problem Relation Age of Onset     Hypertension Mother      Depression Sister         and Anxiety.     Psychotic Disorder Sister         severe anxiety     Neurologic Disorder Brother          from brain tumor.       Social History:  Marital Status:   [2]  Social History     Tobacco Use     Smoking status: Current Every Day Smoker     Packs/day: 0.50     Types: Cigarettes     Smokeless tobacco: Never Used   Substance Use Topics     Alcohol use: No     Comment: pt. has not had any alcohol since hospitalization 2018     Drug use: No     Comment: hx of alcohol abuse.        Medications:         albuterol (PROAIR HFA/PROVENTIL HFA/VENTOLIN HFA) 108 (90 Base) MCG/ACT inhaler       multivitamin w/minerals (THERA-VIT-M) tablet        Review of Systems  As mentioned above in the history present illness. All other systems were reviewed and are negative.    Physical Exam   BP: 119/80  Pulse: 115  Temp: 99.1  F (37.3  C)  Resp: 12  Height: 160 cm (5' 3\")  Weight: 65.8 kg (145 lb)  SpO2: 96 %      Physical Exam  GENERAL: alert and in mild distress  EYES:  Right conjunctiva is not injected and without discharge.  Left conjunctiva is not injected and without discharge.  EARS: Right TM is normal: no effusions, no erythema, and normal landmarks.  Left TM is normal: no effusions, no erythema, and normal landmarks.  NOSE: Nasal mucosa is inflamed.    THROAT: normal.  NECK: supple with enlarged lymph nodes in the anterior cervical area.  CARDIAC:NORMAL - regular rate and rhythm without murmur.  RESP: ABNORMAL - " rhonchi on right and bilateral wheezing  SKIN: normal    ED Course        Procedures    Results for orders placed or performed during the hospital encounter of 11/04/20 (from the past 24 hour(s))   XR Chest Port 1 View    Narrative    CHEST ONE VIEW PORTABLE   11/4/2020 6:10 PM     HISTORY: Wheezing and rhonchi - 2 week illness.    COMPARISON: 6/15/2018      Impression    IMPRESSION: Unremarkable single view of the chest.    GILBERT FRANCO MD   Basic metabolic panel   Result Value Ref Range    Sodium 132 (L) 133 - 144 mmol/L    Potassium 3.2 (L) 3.4 - 5.3 mmol/L    Chloride 98 94 - 109 mmol/L    Carbon Dioxide PENDING 20 - 32 mmol/L    Anion Gap PENDING 3 - 14 mmol/L    Glucose PENDING 70 - 99 mg/dL    Urea Nitrogen PENDING 7 - 30 mg/dL    Creatinine PENDING 0.52 - 1.04 mg/dL    GFR Estimate PENDING >60 mL/min/[1.73_m2]    GFR Estimate If Black PENDING >60 mL/min/[1.73_m2]    Calcium PENDING 8.5 - 10.1 mg/dL   CBC with platelets differential   Result Value Ref Range    WBC 11.8 (H) 4.0 - 11.0 10e9/L    RBC Count 4.28 3.8 - 5.2 10e12/L    Hemoglobin 13.6 11.7 - 15.7 g/dL    Hematocrit 39.8 35.0 - 47.0 %    MCV 93 78 - 100 fl    MCH 31.8 26.5 - 33.0 pg    MCHC 34.2 31.5 - 36.5 g/dL    RDW 13.9 10.0 - 15.0 %    Platelet Count 266 150 - 450 10e9/L    Diff Method Automated Method     % Neutrophils 62.6 %    % Lymphocytes 27.1 %    % Monocytes 7.7 %    % Eosinophils 1.2 %    % Basophils 1.0 %    % Immature Granulocytes 0.4 %    Nucleated RBCs 0 0 /100    Absolute Neutrophil 7.4 1.6 - 8.3 10e9/L    Absolute Lymphocytes 3.2 0.8 - 5.3 10e9/L    Absolute Monocytes 0.9 0.0 - 1.3 10e9/L    Absolute Eosinophils 0.1 0.0 - 0.7 10e9/L    Absolute Basophils 0.1 0.0 - 0.2 10e9/L    Abs Immature Granulocytes 0.1 0 - 0.4 10e9/L    Absolute Nucleated RBC 0.0        Medications   albuterol (PROAIR HFA/PROVENTIL HFA/VENTOLIN HFA) 108 (90 Base) MCG/ACT inhaler 6 puff (6 puffs Inhalation Given 11/4/20 6307)       Assessments & Plan (with  Medical Decision Making)     I have reviewed the nursing notes.    I have reviewed the findings, diagnosis, plan and need for follow up with the patient.  Medical Decision Making:  CXR is indicated.  Rapid Strep test is not indicated. Albuterol inhaler ordered and pt reports marked improvement after use of inhaler.  CBC and basic ordered and reveals mild WBC elevated likely inflammatory in nature.  COVID ordered and result pending.    Assessment:  1) Tobacco dependency and Covid testing, wheezing, alcohol dependence.    PLAN:  Recommend use of albuterol inhaler 6 puffs every 6 hours as needed for shortness of breath.  Recommend quarantine for 2 days.  If quarantine is negative then recommend follow-up with primary care provider for recheck.  If quarantine is positive prefers quarantine until feeling better.  Discussed returning to the emergency room if worsening shortness of breath.  Patient verbalized understanding and discharged in stable condition.      New Prescriptions    ALBUTEROL (PROAIR HFA/PROVENTIL HFA/VENTOLIN HFA) 108 (90 BASE) MCG/ACT INHALER    Inhale 6 puffs into the lungs every 6 hours as needed for shortness of breath / dyspnea or wheezing       Final diagnoses:   Wheezing   Tobacco dependence due to cigarettes   Encounter for laboratory testing for COVID-19 virus       11/4/2020   [facility] EMERGENCY DEPT     Kalli Maxwell, LEX CNP  11/04/20 [id number]

## 2020-11-04 NOTE — ED AVS SNAPSHOT
[facility] Emergency Dept  [address] [address]  Phone: [phone]  Fax: [phone]                                    Karen Tee   MRN: [id number]    Department: [facility] Emergency Dept   Date of Visit: 11/4/2020           After Visit Summary Signature Page    I have received my discharge instructions, and my questions have been answered. I have discussed any challenges I see with this plan with the nurse or doctor.    ..........................................................................................................................................  Patient/Patient Representative Signature      ..........................................................................................................................................  Patient Representative Print Name and Relationship to Patient    ..................................................               ................................................  Date                                   Time    ..........................................................................................................................................  Reviewed by Signature/Title    ...................................................              ..............................................  Date                                               Time          22EPIC Rev 08/18

## 2020-11-05 NOTE — DISCHARGE INSTRUCTIONS
You have been tested for Covid.  The result is not yet available.  You need to quarantine yourself for the next 2 days.  You may use the albuterol inhaler 6 puffs 4 times daily as needed for shortness of breath.  Return to the emergency room if you are having worsening shortness of breath or the albuterol inhaler is no longer working.  If the Covid test is negative I recommend following up with your primary care provider for further evaluation of your breathing and you may need some steroids.  I recommend stopping drinking alcohol.

## 2020-11-10 NOTE — TELEPHONE ENCOUNTER
Coronavirus (COVID-19) Notification    Lab Result   Lab test 2019-nCoV rRt-PCR OR SARS-COV-2 PCR    Nasopharyngeal AND/OR Oropharyngeal swab is NEGATIVE for 2019-nCoV RNA [OR] SARS-COV-2 RNA (COVID-19) RNA    Your result was negative. This means that we didn't find the virus that causes COVID-19 in your sample. A test may show negative when you do actually have the virus. This can happen when the virus is in the early stages of infection, before you feel illness symptoms.    If you have symptoms   Stay home and away from others (self-isolate) until you meet ALL of the guidelines below:    You've had no fever--and no medicine that reduces fever--for 1 full day (24 hours). And      Your other symptoms have gotten better. For example, your cough or breathing has improved. And   ; At least 10 days have passed since your symptoms started. (If you've been told by a doctor that you have a weak immune system, wait 20 days.)         During this time:    Stay home. Don't go to work, school or anywhere else.     Stay in your own room, including for meals. Use your own bathroom if you can.    Stay away from others in your home. No hugging, kissing or shaking hands. No visitors.    Clean  high touch  surfaces often (doorknobs, counters, handles, etc.). Use a household cleaning spray or wipes. You can find a full list on the EPA website at www.epa.gov/pesticide-registration/list-n-disinfectants-use-against-sars-cov-2.    Cover your mouth and nose with a mask, tissue or other face covering to avoid spreading germs.    Wash your hands and face often with soap and water.    Going back to work  Check with your employer for any guidelines to follow for going back to work.  You are sent a letter for your Employer which will serve as formal document notice that you, the employee, tested negative for COVID-19, as of the testing date shown above.    If your symptoms worsen or other concerning symptoms, contact PCP, oncare or consider  returning to Emergency Dept.    Where can I get more information?    [facility]: [email or web address]    Coronavirus Basics: www.health.[facility].mn.us/diseases/coronavirus/basics.html    [facility] Hotline ([phone])    {Name]  Bijal Rene RN  PISTIS [facility] RN  Lung Nodule and ED Lab Result RN  Epic pool (ED late result f/u RN): [id number]  FV INCIDENTAL RADIOLOGY F/U NURSES: [id number]  Ph# [phone]

## 2020-11-17 NOTE — PROGRESS NOTES
"Karen Tee is a 64 year old female who is being evaluated via a billable telephone visit.      The patient has been notified of following:     \"This telephone visit will be conducted via a call between you and your physician/provider. We have found that certain health care needs can be provided without the need for a physical exam.  This service lets us provide the care you need with a short phone conversation.  If a prescription is necessary we can send it directly to your pharmacy.  If lab work is needed we can place an order for that and you can then stop by our lab to have the test done at a later time.    Telephone visits are billed at different rates depending on your insurance coverage. During this emergency period, for some insurers they may be billed the same as an in-person visit.  Please reach out to your insurance provider with any questions.    If during the course of the call the physician/provider feels a telephone visit is not appropriate, you will not be charged for this service.\"    Patient has given verbal consent for Telephone visit?  Yes    What phone number would you like to be contacted at? [phone]    How would you like to obtain your AVS? Mail a copy    Subjective     Karen Tee is a 64 year old female who presents via phone visit today for the following health issues:    HPI     ED/UC Followup:    Facility:  [facility]  Date of visit: 11/4/20  Reason for visit: wheezing  Current Status: Symptoms have not improved, rx for albuterol, does help her at night.  Covid was negative 11/4/20  Chest x-ray was unremarkable 11/4/20  Patient is a smoker.   Patient reports no contact with COVID         Acute Illness  Acute illness concerns: wheezing, ear ache-   Patient reports dysphagia- no sore throat but reports that she has a hard time swallowing/has swollen glands. Ears continue to bother her.   No fever.   No history of COPD or asthma.   Onset/Duration: since mid October " Telephone Encounter by Shantell Peterson at 03/22/18 03:18 PM     Author:  Shantell Peterson Service:  (none) Author Type:  Patient      Filed:  03/22/18 03:19 PM Encounter Date:  3/22/2018 Status:  Signed     :  Shantell Peterson (Patient )              LEIGH RUIZ    Patient Age: 39 year old    ACCT STATUS: COPAY  MESSAGE:[MM1.1T]   Patient calling stating she has an abscess that she thinks is starting to drain and is requesting to speak with RN for further advice at this time[MM1.1M]  Message confirmed with caller.  Call connected to OB Triage Queue.  Routed to Provider's Clinical Pool.      Next and Last Visit with Provider and Department  Next visit with LOULOU MONSALVE is on No match found  Next visit with OBSTETRICS/GYNECOLOGY is on No match found  Last visit with LOULOU MONSALVE was on 04/24/2008 at 12:00 PM in OBSTETRICS/GYN HLD  Last visit with OBSTETRICS/GYNECOLOGY was on 12/07/2017 at  9:30 AM in OBSTETRICS/GYN SEQ     WEIGHT AND HEIGHT: As of 12/07/2017 weight is 160 lbs.(72.576 kg). Height is 5' 3\"(1.600 m).   BMI is 28.35 kg/(m^2) calculated from:     Height 5' 3\" (1.6 m) as of 12/7/17     Weight 160 lb (72.576 kg) as of 12/7/17      Allergies      Allergen   Reactions   • Niacin  Rash   • Peppermint Oil  Other - See Comments     adverse reaction to peppermint oil    • Sulfa Antibiotics       Current outpatient prescriptions       Medication  Sig Dispense Refill   • levonorgestrel-ethinyl estradiol (SEASONALE) 0.15-0.03 MG per tablet Take 1 Tab by mouth daily. 91 Tab 3   • albuterol (PROAIR HFA) 108 (90 BASE) MCG/ACT inhaler Inhale 2 Puffs by mouth every 4 (four) hours as needed for Wheezing. 1 Inhaler 1   • norgestimate-ethinyl estradiol (SPRINTEC 28) 0.25-35 MG-MCG per tablet Take 1 Tab by mouth daily. 28 Tab 3   • Ergocalciferol (VITAMIN D OR) Take 1,000 mg by mouth daily.     • alprazolam (XANAX) 0.25 MG tablet Take 1 Tab by mouth daily as needed for Anxiety.    Symptoms:  Fever: no  Chills/Sweats: no  Headache (location?): YES, from the ear pain  Sinus Pressure: no  Conjunctivitis:  no  Ear Pain: YES: bilateral L>R  Rhinorrhea: no  Congestion: no  Sore Throat: no  Cough: no  Wheeze: YES  Decreased Appetite: YES  Nausea: no  Vomiting: no  Diarrhea: no  Dysuria/Freq.: no  Dysuria or Hematuria: no  Fatigue/Achiness: no  Sick/Strep Exposure: no  Therapies tried and outcome: albuterol inhaler - does help at night, tylenol - does help for about 5-6 hours       Review of Systems   Constitutional, HEENT, cardiovascular, pulmonary, GI, , musculoskeletal, neuro, skin, endocrine and psych systems are negative, except as otherwise noted.       Objective          Vitals:  No vitals were obtained today due to virtual visit.    healthy, alert and no distress  PSYCH: Alert and oriented times 3; coherent speech, normal   rate and volume, able to articulate logical thoughts, able   to abstract reason, no tangential thoughts, no hallucinations   or delusions  Her affect is normal  RESP: No cough, no audible wheezing, able to talk in full sentences  Remainder of exam unable to be completed due to telephone visits            Assessment/Plan:    Assessment & Plan     Wheezing    - predniSONE (DELTASONE) 20 MG tablet; Take 3 tabs by mouth daily x 3 days, then 2 tabs daily x 3 days, then 1 tab daily x 3 days, then 1/2 tab daily x 3 days.    Otalgia, bilateral    - OTOLARYNGOLOGY REFERRAL    Acute bronchitis with symptoms > 10 days    - azithromycin (ZITHROMAX) 250 MG tablet; Take 2 tablets (500 mg) by mouth daily for 1 day, THEN 1 tablet (250 mg) daily for 4 days.                No follow-ups on file.    LEX [facility]    Phone call duration:  10 minutes                 30 Tab 1   • B Complex Vitamins (VITAMIN B COMPLEX OR) Take  by mouth daily.        PHARMACY to use:         [facility] [address]  Pharmacy preference(s) on file: [facility] [address]    CALL BACK INFO:[MM1.1T] Ok to leave response (including medical information) with family member or on answering machine[MM1.1M]  ROUTING:[MM1.1T] Patient's physician/staff[MM1.1M]        PCP: Keri Pennington MD         INS: Payor: [employer] / Plan: *No Plan* / Product Type: *No Product type* / Note: This is the primary coverage, but no account was found for this location or the patient's primary location.   ADDRESS:  [address] [place] [id number][MM1.1T]       Revision History        User Key Date/Time User Provider Type Action    > MM1.1 03/22/18 03:19 PM Shantell Peterson Patient  Sign    M - Manual, T - Template

## 2020-12-02 NOTE — PROGRESS NOTES
Chief Complaint   Patient presents with     Consult For     Ear pain, wheezing, SOB      History of Present Illness  Karen Tee is a 64 year old female who presents today for evaluation.  I am seeing this patient in consultation for bilateral otalgia at the request of the provider Kalli Norton CNP. The patient reports a roughly 1 month history of intermittent bilateral otalgia worse on the right-hand side.  She is also noticed a significant neck mass on the right-hand side and some neck fullness on the left-hand side.  She reports intermittent dysphagia, odynophagia, sore throat, hemoptysis, and mild voice change.  No unintentional weight loss.  She was treated for upper respiratory illness with prednisone and an inhaler with some benefit using the inhaler.  Patient has a significant smoking history of over 45 pack years quitting in October 2020.    Past Medical History  Patient Active Problem List   Diagnosis     Chronic depressive personality disorder     Major depressive disorder, single episode, mild (H)     Elevated blood pressure reading without diagnosis of hypertension     Tobacco use disorder     Hyperlipidemia     Inflammatory disease of breast     Ascites     Portal hypertension (H)     HYPERLIPIDEMIA LDL GOAL <100     Alcohol dependence (H)     Cirrhosis of liver with ascites, unspecified hepatic cirrhosis type (H)     Hyponatremia     Abnormal LFTs     Elevated INR     Leukocytosis     Laryngeal mass     Neck mass     Shortness of breath     Personal history of tobacco use, presenting hazards to health     Current Medications     Current Outpatient Medications:      albuterol (PROAIR HFA/PROVENTIL HFA/VENTOLIN HFA) 108 (90 Base) MCG/ACT inhaler, Inhale 2 puffs into the lungs every 4 hours as needed for shortness of breath / dyspnea or wheezing, Disp: 18 g, Rfl: 3     predniSONE (DELTASONE) 10 MG tablet, Take 3 tablets (30 mg) by mouth daily for 5 days, Disp: 15 tablet, Rfl: 0     albuterol (PROAIR  HFA/PROVENTIL HFA/VENTOLIN HFA) 108 (90 Base) MCG/ACT inhaler, Inhale 6 puffs into the lungs every 6 hours as needed for shortness of breath / dyspnea or wheezing, Disp: 1 Inhaler, Rfl: 0     multivitamin w/minerals (THERA-VIT-M) tablet, Take 1 tablet by mouth daily, Disp: , Rfl:      predniSONE (DELTASONE) 20 MG tablet, Take 3 tabs by mouth daily x 3 days, then 2 tabs daily x 3 days, then 1 tab daily x 3 days, then 1/2 tab daily x 3 days., Disp: 20 tablet, Rfl: 0  No current facility-administered medications for this visit.     Allergies  No Known Allergies    Social History   Social History     Socioeconomic History     Marital status:      Spouse name: None     Number of children: None     Years of education: None     Highest education level: None   Occupational History     None   Social Needs     Financial resource strain: None     Food insecurity     Worry: None     Inability: None     Transportation needs     Medical: None     Non-medical: None   Tobacco Use     Smoking status: Former Smoker     Packs/day: 0.50     Types: Cigarettes     Smokeless tobacco: Never Used     Tobacco comment: has not smoked since 10/17/20   Substance and Sexual Activity     Alcohol use: No     Comment: pt. has not had any alcohol since hospitalization 6/12/2018     Drug use: No     Comment: hx of alcohol abuse.     Sexual activity: Not Currently     Partners: Male   Lifestyle     Physical activity     Days per week: None     Minutes per session: None     Stress: None   Relationships     Social connections     Talks on phone: None     Gets together: None     Attends Yazidism service: None     Active member of club or organization: None     Attends meetings of clubs or organizations: None     Relationship status: None     Intimate partner violence     Fear of current or ex partner: None     Emotionally abused: None     Physically abused: None     Forced sexual activity: None   Other Topics Concern     Parent/sibling w/ CABG,  MI or angioplasty before 65F 55M? Yes   Social History Narrative     None       Family History  Family History   Problem Relation Age of Onset     Hypertension Mother      Depression Sister         and Anxiety.     Psychotic Disorder Sister         severe anxiety     Neurologic Disorder Brother          from brain tumor.       Review of Systems  As per HPI and PMHx, otherwise 10+ comprehensive system review is negative.    Physical Exam  Pulse 104   Temp 99.4  F (37.4  C) (Tympanic)   LMP 2007   SpO2 97%   GENERAL: Patient is a pleasant, cooperative 64 year old female in no acute distress.  HEAD: Normocephalic, atraumatic.  Hair and scalp are normal.  EYES: Pupils are equal, round, reactive to light and accommodation.  Extraocular movements are intact.  The sclera nonicteric without injection.  The extraocular structures are normal.  EARS: Normal shape and symmetry.  No tenderness when palpating the mastoid or tragal areas bilaterally.  Otoscopic exam reveals a minimal amount of cerumen bilaterally.  The bilateral tympanic membranes are round, intact without evidence of effusion, good landmarks.  No retraction, granulation, or drainage.  NOSE: Nares are patent.  Nasal mucosa is pink and moist.  Anterior rhinoscopy reveals a slight leftward mid septal deviation with spur.  No nasal cavity masses, polyps, or mucopurulence on anterior rhinoscopy.  ORAL CAVITY: Dentition is in next repair.  Mucous membranes are moist.  Tongue is mobile, protrudes to the midline.  Palate elevates symmetrically.  Tonsils are 1+, symmetric.  No erythema or exudate.  No oral cavity or oropharyngeal masses, lesions, ulcerations, leukoplakia.  NECK: Supple, trachea is midline.  Patient has a large 5 to 6 cm neck mass in right level 2/3 and palpable lymphadenopathy in the left neck level 2/3.    NEUROLOGIC: Cranial nerves II through XII are grossly intact.  The patients voice is hoarse with intermittent wheeze and stridor on  examination.  Patient is House-Brackmann I/VI bilaterally.  CARDIOVASCULAR: Extremities are warm and well-perfused.  No significant peripheral edema.  RESPIRATORY: Patient has nonlabored breathing without cough, wheeze, stridor.  PSYCHIATRIC: Patient is alert and oriented.  Mood and affect appear normal.  SKIN: Warm and dry.  No scalp, face, or neck lesions noted.    Procedure: Flexible Laryngoscopy  Indication: Dysphonia, neck mass.    To best visualize the upper airway anatomy and due to the chief complaint and HPI, I proceeded with flexible fiberoptic laryngoscopy examination.  The bilateral nasal cavities were anesthetized and decongested with a mixture of lidocaine and neosynephrine.  The bilateral nasal cavities were examined using a flexible fiberoptic laryngoscope.  There were no nasal cavity masses, polyps, or mucopurulence bilaterally. The nasopharynx had a normal appearance with normal Eustachian tube openings and fossa of Rosenmuller bilaterally.  Minimal adenoid tissue.  The base of tongue on the left does have a mass filling the vallecula.  The epiglottis is very granular appearing and swollen consistent with neoplasm.  Supraglottic mass extends from the epiglottis along the right aryepiglottic fold into the right arytenoid.  It appears the right true vocal fold has limited mobility, however difficult to evaluate given the significant swelling of the epiglottis.  The left true vocal fold appears to be moving.  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  The scope was removed.  The patient tolerated the procedure well.    Assessment and Plan    ICD-10-CM    1. Laryngeal mass  J38.7 AUDIOLOGY ADULT REFERRAL     albuterol (PROAIR HFA/PROVENTIL HFA/VENTOLIN HFA) 108 (90 Base) MCG/ACT inhaler     predniSONE (DELTASONE) 10 MG tablet     CT Soft Tissue Neck w Contrast     LARYNGOSCOPY FLEX FIBEROPTIC, DIAGNOSTIC     Case Request: AWAKE FIBEROPTIC INTUBATION,  POSSIBLE AWAKE TRACHEOSTOMY   2. Neck mass  R22.1 AUDIOLOGY ADULT REFERRAL     albuterol (PROAIR HFA/PROVENTIL HFA/VENTOLIN HFA) 108 (90 Base) MCG/ACT inhaler     predniSONE (DELTASONE) 10 MG tablet     CT Soft Tissue Neck w Contrast     LARYNGOSCOPY FLEX FIBEROPTIC, DIAGNOSTIC     Case Request: AWAKE FIBEROPTIC INTUBATION, POSSIBLE AWAKE TRACHEOSTOMY   3. Shortness of breath  R06.02 AUDIOLOGY ADULT REFERRAL     albuterol (PROAIR HFA/PROVENTIL HFA/VENTOLIN HFA) 108 (90 Base) MCG/ACT inhaler     predniSONE (DELTASONE) 10 MG tablet     CT Soft Tissue Neck w Contrast     LARYNGOSCOPY FLEX FIBEROPTIC, DIAGNOSTIC     Case Request: AWAKE FIBEROPTIC INTUBATION, POSSIBLE AWAKE TRACHEOSTOMY   4. Personal history of tobacco use, presenting hazards to health  Z87.891 AUDIOLOGY ADULT REFERRAL     albuterol (PROAIR HFA/PROVENTIL HFA/VENTOLIN HFA) 108 (90 Base) MCG/ACT inhaler     predniSONE (DELTASONE) 10 MG tablet     CT Soft Tissue Neck w Contrast     LARYNGOSCOPY FLEX FIBEROPTIC, DIAGNOSTIC     Case Request: AWAKE FIBEROPTIC INTUBATION, POSSIBLE AWAKE TRACHEOSTOMY      It was my pleasure seeing Karen Tee today in clinic.  Fortunately, the patient presents today with likely a an advanced stage laryngeal cell carcinoma based on clinical examination.  I am somewhat concerned about her airway given her slight respiratory distress, wheeze, and stridor in clinic.  She does not have dona or overt airway obstruction.  It is quite possible that she would require tracheostomy at some point especially if they would attempt chemoradiation for treatment.  We will also need a tissue diagnosis prior to staging with PET CT.  This could be done with FNA biopsy of the neck lymphadenopathy versus DL and biopsy.  If she were to undergo a DL, she would absolutely require a tracheostomy.      I contacted ENT on-call at [facility].  I spoke with Dr. Barrera regarding the patient.  Overall the patient's respiratory status is  stable, however, she would be a significant risk to transport having an airway emergency.  After discussion, recommendations were to secure the patient's airway prior to transferring her to the [facility].  We discussed awake fiberoptic intubation in the OR with possible awake tracheostomy.  Once the patient's airway is secured, we will transfer down to the [facility] for further evaluation and management.    We discussed the risks, benefits, alternatives, options of awake fiberoptic intubation, possible awake tracheostomy including, but not, limited to: risk of bleeding, risk of infection, risk of post-operative pain, risk of injury to the teeth, tongue, lips, gums, risk of scarring, risk of airway fire, potential need for additional procedures, risk of loss of airway/death, risk of general anesthesia.  The patient voiced understanding and is willing to proceed.    Marshall Canales MD  Department of Otolarygology-Head and Neck Surgery  [facility]

## 2020-12-03 PROBLEM — R22.1 NECK MASS: Status: ACTIVE | Noted: 2020-01-01

## 2020-12-03 PROBLEM — J38.7 LARYNGEAL MASS: Status: ACTIVE | Noted: 2020-01-01

## 2020-12-03 PROBLEM — Z87.891 PERSONAL HISTORY OF TOBACCO USE, PRESENTING HAZARDS TO HEALTH: Status: ACTIVE | Noted: 2020-01-01

## 2020-12-03 PROBLEM — R06.02 SHORTNESS OF BREATH: Status: ACTIVE | Noted: 2020-01-01

## 2020-12-03 NOTE — PROGRESS NOTES
AUDIOLOGY REPORT    SUBJECTIVE:  Karen Tee is a 64 year old female who was [facility] for an audiologic evaluation, referred by Dr. Canales.  No previous audiograms are available at today's appointment.  The patient reports otalgia bilaterally with the pain much greater in the left ear. She also reports swollen glands on her right side of her neck. The patient states she has begun wheezing today.     After checking of her vitals and being seen by Dr. Canales today's audiogram was deferred.       PLAN: It is recommended that the patient return to clinic for a hearing evaluation when medically clear.  Please call this clinic with questions regarding these results or recommendations.        Mercedes Houston M.A. F-AAA  Clinical audiologist [place] # [id number]  12/3/2020

## 2020-12-03 NOTE — LETTER
12/3/2020         RE: Karen Tee  [address]  [address]  [facility] [address]        Dear Colleague,    Thank you for referring your patient, Karen Tee, to the [facility]. Please see a copy of my visit note below.    Chief Complaint   Patient presents with     Consult For     Ear pain, wheezing, SOB      History of Present Illness  Karen Tee is a 64 year old female who presents today for evaluation.  I am seeing this patient in consultation for bilateral otalgia at the request of the provider Kalli Norton CNP. The patient reports a roughly 1 month history of intermittent bilateral otalgia worse on the right-hand side.  She is also noticed a significant neck mass on the right-hand side and some neck fullness on the left-hand side.  She reports intermittent dysphagia, odynophagia, sore throat, hemoptysis, and mild voice change.  No unintentional weight loss.  She was treated for upper respiratory illness with prednisone and an inhaler with some benefit using the inhaler.  Patient has a significant smoking history of over 45 pack years quitting in October 2020.    Past Medical History  Patient Active Problem List   Diagnosis     Chronic depressive personality disorder     Major depressive disorder, single episode, mild (H)     Elevated blood pressure reading without diagnosis of hypertension     Tobacco use disorder     Hyperlipidemia     Inflammatory disease of breast     Ascites     Portal hypertension (H)     HYPERLIPIDEMIA LDL GOAL <100     Alcohol dependence (H)     Cirrhosis of liver with ascites, unspecified hepatic cirrhosis type (H)     Hyponatremia     Abnormal LFTs     Elevated INR     Leukocytosis     Laryngeal mass     Neck mass     Shortness of breath     Personal history of tobacco use, presenting hazards to health     Current Medications     Current Outpatient Medications:      albuterol (PROAIR HFA/PROVENTIL HFA/VENTOLIN HFA) 108 (90 Base) MCG/ACT  inhaler, Inhale 2 puffs into the lungs every 4 hours as needed for shortness of breath / dyspnea or wheezing, Disp: 18 g, Rfl: 3     predniSONE (DELTASONE) 10 MG tablet, Take 3 tablets (30 mg) by mouth daily for 5 days, Disp: 15 tablet, Rfl: 0     albuterol (PROAIR HFA/PROVENTIL HFA/VENTOLIN HFA) 108 (90 Base) MCG/ACT inhaler, Inhale 6 puffs into the lungs every 6 hours as needed for shortness of breath / dyspnea or wheezing, Disp: 1 Inhaler, Rfl: 0     multivitamin w/minerals (THERA-VIT-M) tablet, Take 1 tablet by mouth daily, Disp: , Rfl:      predniSONE (DELTASONE) 20 MG tablet, Take 3 tabs by mouth daily x 3 days, then 2 tabs daily x 3 days, then 1 tab daily x 3 days, then 1/2 tab daily x 3 days., Disp: 20 tablet, Rfl: 0  No current facility-administered medications for this visit.     Allergies  No Known Allergies    Social History   Social History     Socioeconomic History     Marital status:      Spouse name: None     Number of children: None     Years of education: None     Highest education level: None   Occupational History     None   Social Needs     Financial resource strain: None     Food insecurity     Worry: None     Inability: None     Transportation needs     Medical: None     Non-medical: None   Tobacco Use     Smoking status: Former Smoker     Packs/day: 0.50     Types: Cigarettes     Smokeless tobacco: Never Used     Tobacco comment: has not smoked since 10/17/20   Substance and Sexual Activity     Alcohol use: No     Comment: pt. has not had any alcohol since hospitalization 6/12/2018     Drug use: No     Comment: hx of alcohol abuse.     Sexual activity: Not Currently     Partners: Male   Lifestyle     Physical activity     Days per week: None     Minutes per session: None     Stress: None   Relationships     Social connections     Talks on phone: None     Gets together: None     Attends Christianity service: None     Active member of club or organization: None     Attends meetings of clubs  or organizations: None     Relationship status: None     Intimate partner violence     Fear of current or ex partner: None     Emotionally abused: None     Physically abused: None     Forced sexual activity: None   Other Topics Concern     Parent/sibling w/ CABG, MI or angioplasty before 65F 55M? Yes   Social History Narrative     None       Family History  Family History   Problem Relation Age of Onset     Hypertension Mother      Depression Sister         and Anxiety.     Psychotic Disorder Sister         severe anxiety     Neurologic Disorder Brother          from brain tumor.       Review of Systems  As per HPI and PMHx, otherwise 10+ comprehensive system review is negative.    Physical Exam  Pulse 104   Temp 99.4  F (37.4  C) (Tympanic)   LMP 2007   SpO2 97%   GENERAL: Patient is a pleasant, cooperative 64 year old female in no acute distress.  HEAD: Normocephalic, atraumatic.  Hair and scalp are normal.  EYES: Pupils are equal, round, reactive to light and accommodation.  Extraocular movements are intact.  The sclera nonicteric without injection.  The extraocular structures are normal.  EARS: Normal shape and symmetry.  No tenderness when palpating the mastoid or tragal areas bilaterally.  Otoscopic exam reveals a minimal amount of cerumen bilaterally.  The bilateral tympanic membranes are round, intact without evidence of effusion, good landmarks.  No retraction, granulation, or drainage.  NOSE: Nares are patent.  Nasal mucosa is pink and moist.  Anterior rhinoscopy reveals a slight leftward mid septal deviation with spur.  No nasal cavity masses, polyps, or mucopurulence on anterior rhinoscopy.  ORAL CAVITY: Dentition is in next repair.  Mucous membranes are moist.  Tongue is mobile, protrudes to the midline.  Palate elevates symmetrically.  Tonsils are 1+, symmetric.  No erythema or exudate.  No oral cavity or oropharyngeal masses, lesions, ulcerations, leukoplakia.  NECK: Supple, trachea is  midline.  Patient has a large 5 to 6 cm neck mass in right level 2/3 and palpable lymphadenopathy in the left neck level 2/3.    NEUROLOGIC: Cranial nerves II through XII are grossly intact.  The patients voice is hoarse with intermittent wheeze and stridor on examination.  Patient is House-Brackmann I/VI bilaterally.  CARDIOVASCULAR: Extremities are warm and well-perfused.  No significant peripheral edema.  RESPIRATORY: Patient has nonlabored breathing without cough, wheeze, stridor.  PSYCHIATRIC: Patient is alert and oriented.  Mood and affect appear normal.  SKIN: Warm and dry.  No scalp, face, or neck lesions noted.    Procedure: Flexible Laryngoscopy  Indication: Dysphonia, neck mass.    To best visualize the upper airway anatomy and due to the chief complaint and HPI, I proceeded with flexible fiberoptic laryngoscopy examination.  The bilateral nasal cavities were anesthetized and decongested with a mixture of lidocaine and neosynephrine.  The bilateral nasal cavities were examined using a flexible fiberoptic laryngoscope.  There were no nasal cavity masses, polyps, or mucopurulence bilaterally. The nasopharynx had a normal appearance with normal Eustachian tube openings and fossa of Rosenmuller bilaterally.  Minimal adenoid tissue.  The base of tongue on the left does have a mass filling the vallecula.  The epiglottis is very granular appearing and swollen consistent with neoplasm.  Supraglottic mass extends from the epiglottis along the right aryepiglottic fold into the right arytenoid.  It appears the right true vocal fold has limited mobility, however difficult to evaluate given the significant swelling of the epiglottis.  The left true vocal fold appears to be moving.  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  The scope was removed.  The patient tolerated the procedure well.    Assessment and Plan    ICD-10-CM    1. Laryngeal mass  J38.7 AUDIOLOGY  ADULT REFERRAL     albuterol (PROAIR HFA/PROVENTIL HFA/VENTOLIN HFA) 108 (90 Base) MCG/ACT inhaler     predniSONE (DELTASONE) 10 MG tablet     CT Soft Tissue Neck w Contrast     LARYNGOSCOPY FLEX FIBEROPTIC, DIAGNOSTIC     Case Request: AWAKE FIBEROPTIC INTUBATION, POSSIBLE AWAKE TRACHEOSTOMY   2. Neck mass  R22.1 AUDIOLOGY ADULT REFERRAL     albuterol (PROAIR HFA/PROVENTIL HFA/VENTOLIN HFA) 108 (90 Base) MCG/ACT inhaler     predniSONE (DELTASONE) 10 MG tablet     CT Soft Tissue Neck w Contrast     LARYNGOSCOPY FLEX FIBEROPTIC, DIAGNOSTIC     Case Request: AWAKE FIBEROPTIC INTUBATION, POSSIBLE AWAKE TRACHEOSTOMY   3. Shortness of breath  R06.02 AUDIOLOGY ADULT REFERRAL     albuterol (PROAIR HFA/PROVENTIL HFA/VENTOLIN HFA) 108 (90 Base) MCG/ACT inhaler     predniSONE (DELTASONE) 10 MG tablet     CT Soft Tissue Neck w Contrast     LARYNGOSCOPY FLEX FIBEROPTIC, DIAGNOSTIC     Case Request: AWAKE FIBEROPTIC INTUBATION, POSSIBLE AWAKE TRACHEOSTOMY   4. Personal history of tobacco use, presenting hazards to [facility]  Z87.891 AUDIOLOGY ADULT REFERRAL     albuterol (PROAIR HFA/PROVENTIL HFA/VENTOLIN HFA) 108 (90 Base) MCG/ACT inhaler     predniSONE (DELTASONE) 10 MG tablet     CT Soft Tissue Neck w Contrast     LARYNGOSCOPY FLEX FIBEROPTIC, DIAGNOSTIC     Case Request: AWAKE FIBEROPTIC INTUBATION, POSSIBLE AWAKE TRACHEOSTOMY      It was my pleasure seeing Karen Tee today in clinic.  Fortunately, the patient presents today with likely a an advanced stage laryngeal cell carcinoma based on clinical examination.  I am somewhat concerned about her airway given her slight respiratory distress, wheeze, and stridor in clinic.  She does not have dona or overt airway obstruction.  It is quite possible that she would require tracheostomy at some point especially if they would attempt chemoradiation for treatment.  We will also need a tissue diagnosis prior to staging with PET CT.  This could be done with FNA biopsy of the neck  lymphadenopathy versus DL and biopsy.  If she were to undergo a DL, she would absolutely require a tracheostomy.      I contacted ENT on-call at [facility].  I spoke with Dr. Barrera regarding the patient.  Overall the patient's respiratory status is stable, however, she would be a significant risk to transport having an airway emergency.  After discussion, recommendations were to secure the patient's airway prior to transferring her to the [facility].  We discussed awake fiberoptic intubation in the OR with possible awake tracheostomy.  Once the patient's airway is secured, we will transfer down to the [facility] for further evaluation and management.    Marshall Canales MD  Department of Otolarygology-Head and Neck Surgery  [facility]                 Again, thank you for allowing me to participate in the care of your patient.        Sincerely,        Marshall Canales MD

## 2020-12-04 PROBLEM — Z93.0 TRACHEOSTOMY IN PLACE (H): Status: ACTIVE | Noted: 2020-01-01

## 2020-12-04 PROBLEM — R06.03 RESPIRATORY DISTRESS: Status: ACTIVE | Noted: 2020-01-01

## 2020-12-04 PROBLEM — F10.90 HABITUAL ALCOHOL USE: Status: ACTIVE | Noted: 2020-01-01

## 2020-12-04 PROBLEM — K70.31 ALCOHOLIC CIRRHOSIS OF LIVER WITH ASCITES (H): Status: ACTIVE | Noted: 2020-01-01

## 2020-12-04 PROBLEM — Z93.0 S/P EMERGENCY TRACHEOTOMY FOR ASSISTANCE IN BREATHING (H): Status: ACTIVE | Noted: 2020-01-01

## 2020-12-04 NOTE — PROGRESS NOTES
Patient did well today.  No significant breathing issues.  She underwent video swallow study which showed dona aspiration in all consistencies.  She needs enteral feeding for nutrition and hydration.    On exam, trach in good placement.  Minimal secretions.  Satting well on trach collar.    PROCEDURE: The bilateral nares were anesthetized with 4% lidocaine and phenylephrine nasal spray.  A weighted feeding tube was passed to the right nasal cavity into the oropharynx.  Swallowing assisted in passage of the feeding tube.  The feeding tube was secured at 70 cm at the naris.  There did not feel to be any resistance or coiling.  Guidewire was left in place.  We will order an x-ray to confirm placement prior to use.    I placed order for an x-ray to confirm placement of the feeding tube.  I also discussed with the patient PEG placement possibly early this next week.  Dr. Crenshaw is on call over the weekend and can handle phone calls on the patient.  I be happy to take any phone calls if there is questions or concerns.  My plan is to see her again on Monday.  I might leave her trach in until after her PEG tube and then change it to a cuffless trach following her procedure.    Marshall Canales MD  Department of Otolarygology-Head and Neck Surgery  [facility]    [FreeTextEntry1] : Internal hemorrhoids\par -RBL performed on the RP internal hemorrhoid w/o complication\par -Continue fiber supplement\par -f/u if symptoms recur

## 2020-12-04 NOTE — ANESTHESIA PROCEDURE NOTES
Airway   Date/Time: 12/3/2020 6:19 PM   Patient location during procedure: OR    Staff -   CRNA: Ashutosh Philip APRN CRNA  Performed By: CRNA    Indications and Patient Condition  Indications for airway management: shannon-procedural  Induction type:intravenous    Final Airway Details  Final airway type: endotracheal airway  Successful airway:ETT - single  Endotracheal Airway Details   ETT size (mm): 5.0  Cuffed: yes  Successful intubation technique: direct laryngoscopy  Grade View of Cords: 1  Secured at (cm): 21  Secured with: plastic tape    Post intubation assessment   Placement verified by: capnometry, equal breath sounds and x-ray   Number of attempts at approach: 1  Secured with:plastic tape

## 2020-12-04 NOTE — ANESTHESIA PROCEDURE NOTES
Airway   Date/Time: 12/3/2020 7:10 PM   Patient location during procedure: OR    Staff -   CRNA: Sanam San APRN CRNA  Performed By: CRNA  Referred By: abelino    Indications and Patient Condition  Indications for airway management: shannon-procedural  Mask difficulty assessment: 0 - not attempted    Final Airway Details  Final airway type: trach/surgical airway    Endotracheal Airway Details   Secured with: ties    Post intubation assessment   Number of attempts at approach: 1  Secured with:ties  Ease of procedure: difficultAdditional Comments  Trach was placed by doctor Canales intra-op

## 2020-12-04 NOTE — ANESTHESIA CARE TRANSFER NOTE
Patient: Karen Tee    Procedure(s):  AWAKE FIBEROPTIC INTUBATION, OPEN TRACHEOSTOMY, DIRECT LARYNGOSCOPY WITH BIOPSY    Diagnosis: Laryngeal mass [J38.7]  Neck mass [R22.1]  Shortness of breath [R06.02]  Personal history of tobacco use, presenting hazards to health [Z87.891]  Diagnosis Additional Information: No value filed.    Anesthesia Type:   General, Trach     Note:  Airway :Tracheostomy  Patient transferred to:ICU  ICU Handoff: Call for PAUSE to initiate/utilize ICU HANDOFF, Identified Patient, Identified Responsible Provider, Reviewed the Pertinent Medical History, Discussed Surgical Course, Reviewed Intra-OP Anesthesia Management and Issues during Anesthesia, Set Expectations for Post Procedure Period and Allowed Opportunity for Questions and Acknowledgement of Understanding      Vitals: (Last set prior to Anesthesia Care Transfer)    CRNA VITALS  12/3/2020 1922 - 12/3/2020 2008      12/3/2020             Resp Rate (observed):  (!) 5                Electronically Signed By: LEX Ordaz CRNA  December 3, 2020  8:08 PM

## 2020-12-04 NOTE — PROGRESS NOTES
[facility]ist Progress Note           Assessment & Plan        Karen Tee is a 64 year old female admitted on 12/3/2020. She presented to ENT clinic for evaluation of ear pain and was found to have respiratory distress and an unstable airway due to a laryngeal mass for which she underwent an emergent tracheostomy and is now admitted for further evaluation and cares.     Unstable airway, S/P emergency tracheotomy for assistance in breathing  Tracheostomy in place  Laryngeal mass  Neck mass  12/3/20 -- Underwent urgent tracheotomy with tracheostomy placement on 12/3 due to new finding of laryngeal mass causing unstable airway. Biopsies were taken during procedure.  - ENT placed all tracheostomy orders; for trach-specific questions contact ENT  - ENT consult for tracheostomy management  - NPO  - Speech consult, patient will need to pass swallow study before discharge; if fails would need a feeding tube  - Pathology pending  - Will need outpatient follow-up for cancer work-up  - Care Transitions consult to assist with obtaining necessary home equipment to manage a tracheostomy  12/4/2020 -- doing well so far - see ENT note for details/plan - likely replacing trach on Monday with possible discharge that pm or Tues.      Aspiration on swallow evaluation  12/4/2020 -- failed all consistencies on video swallow evaluation - ENT planning to place nasal feeding tube if able, may need G-tube placement on Monday      Habitual alcohol use  Alcoholic cirrhosis with ascites  12/3/20 -- Per chart review she has frequent alcohol use, although she says not daily, last drink 3-4 days ago. Denies prior history of withdrawal or seizures. Does appear to have prior paracentesis due to cirrhosis and ascites. LFTs normal on admission.   - No evidence of withdrawal on admission, so no CIWA indicated, but monitor for evidence and initiate CIWA as appropriate  12/4/2020 -- doing well so far      Personal history of tobacco  "use, presenting hazards to health  Quit smoking 2 months ago. Has not been using nicotine replacement.      Asymptomatic COVID screen negative.       Diet:   NPO    DVT Prophylaxis: Low Risk/Ambulatory with no VTE prophylaxis indicated    Palm Catheter: not present    Code Status: Full Code  - discussed with patient on admission                  Disposition  Likely here until at least Monday             Interval History:   No new concerns.  Doing well so far.    No other pain             Review of Systems:    ROS: 10 point ROS neg other than the symptoms noted above in the HPI.           Medications:   Current active medications and PTA medications reviewed, see medication list for details.            Physical Exam:   Vitals were reviewed  Patient Vitals for the past 24 hrs:   BP Temp Temp src Pulse Resp SpO2 Height Weight   12/04/20 1600 -- -- -- -- -- 95 % -- --   12/04/20 1400 (!) 130/99 -- -- 66 12 98 % -- --   12/04/20 1300 127/69 -- -- 71 10 98 % -- --   12/04/20 1200 129/61 -- -- 69 16 97 % -- --   12/04/20 1100 125/62 -- -- 61 14 95 % -- --   12/04/20 1000 122/63 -- -- 56 20 95 % -- --   12/04/20 0915 -- -- -- -- -- 94 % -- --   12/04/20 0900 130/80 -- -- 53 29 93 % -- --   12/04/20 0830 -- -- -- -- -- (!) 89 % -- --   12/04/20 0800 121/70 -- -- 56 26 93 % -- --   12/04/20 0750 -- 98.8  F (37.1  C) Axillary -- -- -- -- --   12/04/20 0700 132/85 -- -- 65 12 98 % 1.6 m (5' 2.99\") 68 kg (149 lb 14.6 oz)   12/04/20 0500 121/59 -- -- 50 10 96 % -- --   12/04/20 0400 117/65 98  F (36.7  C) Axillary 64 11 95 % -- --   12/04/20 0300 133/68 -- -- 62 9 96 % -- --   12/04/20 0200 117/59 -- -- 70 11 97 % -- --   12/04/20 0100 117/57 -- -- 56 13 97 % -- --   12/04/20 0000 122/65 98.2  F (36.8  C) Axillary (!) 46 19 96 % -- --   12/03/20 2300 114/65 98.3  F (36.8  C) Axillary 75 24 97 % -- --   12/03/20 2245 128/64 -- -- 69 24 97 % -- --   12/03/20 2230 106/56 -- -- 76 24 -- -- --   12/03/20 2215 114/66 -- -- 68 22 96 % " "-- --   20 118/64 98  F (36.7  C) Axillary 79 22 97 % -- --   20 107/57 -- -- 72 22 96 % -- --   20 122/68 -- -- 76 22 94 % -- --   20 117/66 -- -- 77 22 93 % -- --   20 116/61 98  F (36.7  C) Axillary 79 24 92 % -- --   20 116/61 -- -- -- 16 96 % -- --   20 102/52 -- -- -- 16 94 % -- --   20 102/52 -- -- -- 16 96 % -- --   20 101/61 98.2  F (36.8  C) Axillary -- 16 96 % -- --   20 119/72 98.8  F (37.1  C) Oral 99 -- 97 % 1.6 m (5' 3\") 65.8 kg (145 lb)       Temperatures:  Current - Temp: 98.8  F (37.1  C); Max - Temp  Av.3  F (36.8  C)  Min: 98  F (36.7  C)  Max: 98.8  F (37.1  C)  Respiration range: Resp  Av.9  Min: 9  Max: 29  Pulse range: Pulse  Av.3  Min: 46  Max: 99  Blood pressure range: Systolic (24hrs), Av , Min:101 , Max:133   ; Diastolic (24hrs), Av, Min:52, Max:99    Pulse oximetry range: SpO2  Av.4 %  Min: 89 %  Max: 98 %  I/O last 3 completed shifts:  In: 1025 [I.V.:1025]  Out: 300 [Urine:300]    Intake/Output Summary (Last 24 hours) at 2020 1700  Last data filed at 2020 1115  Gross per 24 hour   Intake 1025 ml   Output 300 ml   Net 725 ml     EXAM:  General: awake and alert, NAD, oriented x 3  Head: normocephalic  Neck: unremarkable, no lymphadenopathy   HEENT: oropharynx pink and moist    Heart: Regular rate and rhythm, no murmurs, rubs, or gallops  Lungs: clear to auscultation bilaterally with good air movement throughout  Abdomen: soft, non-tender, no masses or organomegaly  Extremities: no edema in lower extremities   Skin unremarkable.               Data:     Results for orders placed or performed during the hospital encounter of 20 (from the past 24 hour(s))   Asymptomatic COVID-19 Virus (Coronavirus) by PCR    Specimen: Nasopharyngeal   Result Value Ref Range    COVID-19 Virus PCR to [facility] - Source Nasopharyngeal     COVID-19 Virus PCR to [facility] - Result       Test received-See reflex to IDDL test SARS CoV2 (COVID-19) Virus RT-PCR   SARS-CoV-2 COVID-19 Virus (Coronavirus) RT-PCR Nasopharyngeal    Specimen: Nasopharyngeal   Result Value Ref Range    SARS-CoV-2 Virus Specimen Source Nasopharyngeal     SARS-CoV-2 PCR Result NEGATIVE     SARS-CoV-2 PCR Comment       Testing was performed using the Xpert Xpress SARS-CoV-2 Assay on the Cepheid Gene-Xpert   Instrument Systems. Additional information about this Emergency Use Authorization (EUA)   assay can be found via the Lab Guide.     CBC with platelets differential   Result Value Ref Range    WBC 9.3 4.0 - 11.0 10e9/L    RBC Count 3.73 (L) 3.8 - 5.2 10e12/L    Hemoglobin 12.1 11.7 - 15.7 g/dL    Hematocrit 35.5 35.0 - 47.0 %    MCV 95 78 - 100 fl    MCH 32.4 26.5 - 33.0 pg    MCHC 34.1 31.5 - 36.5 g/dL    RDW 15.1 (H) 10.0 - 15.0 %    Platelet Count 233 150 - 450 10e9/L    Diff Method Automated Method     % Neutrophils 90.9 %    % Lymphocytes 7.3 %    % Monocytes 1.0 %    % Eosinophils 0.0 %    % Basophils 0.5 %    % Immature Granulocytes 0.3 %    Nucleated RBCs 0 0 /100    Absolute Neutrophil 8.5 (H) 1.6 - 8.3 10e9/L    Absolute Lymphocytes 0.7 (L) 0.8 - 5.3 10e9/L    Absolute Monocytes 0.1 0.0 - 1.3 10e9/L    Absolute Eosinophils 0.0 0.0 - 0.7 10e9/L    Absolute Basophils 0.1 0.0 - 0.2 10e9/L    Abs Immature Granulocytes 0.0 0 - 0.4 10e9/L    Absolute Nucleated RBC 0.0    Comprehensive metabolic panel   Result Value Ref Range    Sodium 137 133 - 144 mmol/L    Potassium 4.1 3.4 - 5.3 mmol/L    Chloride 105 94 - 109 mmol/L    Carbon Dioxide 26 20 - 32 mmol/L    Anion Gap 6 3 - 14 mmol/L    Glucose 115 (H) 70 - 99 mg/dL    Urea Nitrogen 6 (L) 7 - 30 mg/dL    Creatinine 0.44 (L) 0.52 - 1.04 mg/dL    GFR Estimate >90 >60 mL/min/[1.73_m2]    GFR Estimate If Black >90 >60 mL/min/[1.73_m2]    Calcium 8.6 8.5 - 10.1 mg/dL    Bilirubin Total 0.5 0.2 - 1.3 mg/dL    Albumin 2.7 (L) 3.4 - 5.0 g/dL    Protein Total  6.8 6.8 - 8.8 g/dL    Alkaline Phosphatase 140 40 - 150 U/L    ALT 15 0 - 50 U/L    AST 28 0 - 45 U/L   Social Work IP Consult    Narrative    RaleighjoelSylviaUdayaldenMillicent LUTHER     12/4/2020 12:30 PM  Care Management Initial Consult    General Information  Assessment completed with: Karen Alvarado  Type of CM/SW Visit: Initial Assessment  Primary Care Provider verified and updated as needed: Yes   Readmission within the last 30 days:        Reason for Consult: discharge planning  Advance Care Planning:     NA     Communication Assessment  Patient's communication style: spoken language (English or   Bilingual)(Pt used white board to answer SW questions)    Hearing Difficulty or Deaf: no   Wear Glasses or Blind: no    Cognitive  Cognitive/Neuro/Behavioral: WDL  Level of Consciousness: alert          Mood/Behavior: calm;cooperative  Best Language: (able to   write on white board for communication)  Speech: trached    Living Environment:   People in home: alone     Current living Arrangements: apartment      Able to return to prior arrangements: yes     Family/Social Support:  Care provided by: self  Provides care for: no one  Marital Status:   Children;Sibling(s)        Pt reports that her 2 sons, sister &   brother all live locally.  Description of Support System: Supportive;Involved    Support Assessment: Adequate family and caregiver   support;Adequate social supports    Current Resources:   Skilled Home Care Services:  None yet  Community Resources: None  Equipment currently used at home: none  Supplies currently used at home: None    Employment/Financial:  Employment Status: disabled        Financial Concerns: No concerns identified   Lifestyle & Psychosocial Needs:  Socioeconomic History     Marital status:      Spouse name: Not on file     Number of children: Not on file     Years of education: Not on file     Highest education level: Not on file     Tobacco Use     Smoking status: Former Smoker      "Packs/day: 0.50     Types: Cigarettes     Quit date: 10/17/2020     Years since quittin.1     Smokeless tobacco: Never Used     Tobacco comment: has not smoked since 10/17/20   Substance and Sexual Activity     Alcohol use: No     Comment: pt. has not had any alcohol since hospitalization   2018     Drug use: No     Comment: hx of alcohol abuse.     Sexual activity: Not Currently     Partners: Male     Functional Status:  Prior to admission patient needed assistance: None    Mental Health Status:  NA        Chemical Dependency Status:  Chemical Dependency Status: No Current Concerns   Pt reports she   has not drank for previous several days.        Values/Beliefs:  Spiritual, Cultural Beliefs, Voodoo Practices, Values that   affect care:       NA        Additional Information:  SW met with pt today to discuss hospitalization & discharge   planning.  SW and pt discussed 2 plans of care for dischargin. Go home with [facility] ([phone] Fax:   [phone]) vs.  2.  Go to a TCU for further education, teaching & stability for   her medical plan of care.  Referrals not sent today, as pt hopes   to discharge to home.      Pt requests that SW work with [facility]   ([phone] Fax: [phone]) as she prefers to stay in   network with her PCP.  SW sent referral to [facility] today & also had   discussion with a [facility] RN who shared, \"The patient would just   need to be made acutely aware that we would be there to reinforce   the teaching that she has hopefully already been given in the   hospital prior to discharge on her trach care (cleaning, dressing   changes, suctioning, lavage, etc).  We will not have capacity to   visit her daily, or even multiple times per week to perform her   care for her - she will need to be independent with this care   and/or have dependable, easily accessible support available to   her to help with this possible multiple times per day (sometimes   at " "the drop of a hat).  I don t mean to be dramatic, but that is   simply the reality  of having a new trach while living alone,   especially in these times.\"  SW responded that hospital RN staff   will be educating & documenting pt's cares over the next several   days, which will assist us in creating a safe plan of care for   the pt.    CTS staff also instructed that [facility] will need to order   the following supplies for the pt upon discharge, per MD Canales's   note today, \"The patient will need trach teaching, trach   supplies, and evaluation to see if she would need some home   health given that she lives alone.  The patient needs the following supplies for home:  - An extra #6 cuffless Shiley tracheostomy tube with disposable   inner cannula  - Multiple disposable inner cannulas for a #6 Shiley tracheostomy   tube (enough for roughly 1 a day, 30-month, etc.)  - 10 or 12 Fr suction catheters for trach cares (enough for 3-4   per day)  - Multiple saline jets (3-5 mL) for tracheal lavage (3-4 per day)  - Portable suction machine  - Soft reusable Velcro trach ties\"    CTS to continue to follow for discharge planning needs &   supplies.    LUTHER Ramesh       Glucose by meter   Result Value Ref Range    Glucose 143 (H) 70 - 99 mg/dL   Glucose by meter   Result Value Ref Range    Glucose 151 (H) 70 - 99 mg/dL   INR   Result Value Ref Range    INR 1.14 0.86 - 1.14   Glucose by meter   Result Value Ref Range    Glucose 122 (H) 70 - 99 mg/dL   XR Video Swallow with SLP or OT    Narrative    XR VIDEO SWALLOW WITH SLP OR OT 12/4/2020 3:49 PM    HISTORY: Tracheostomy. Evaluate swallowing function.    FLUORO TIME:  2.0 minutes.    PROCEDURE: The patient's swallowing mechanism is assessed under  fluoroscopy in conjunction with a speech pathologist. [place] and  pudding type consistencies are utilized. 5 fluoroscopic sequences are  obtained.    FINDINGS: The epiglottis has a truncated appearance. There is " poor  downward movement and poor laryngeal elevation when the swallow is  triggered. Penetration is seen during the swallow to the level of the  vocal cords. There is no spontaneous cough reflex. Later, penetrated  contents can be seen extending below the vocal cords, consistent with  aspiration.      Impression    IMPRESSION: There is penetration and subsequent aspiration of ingested  contents, as described above.    GREG WALLIS MD   Glucose by meter   Result Value Ref Range    Glucose 145 (H) 70 - 99 mg/dL           Attestation:  I have reviewed today's vital signs, notes, medications, labs and imaging.  Amount of time spent in direct patient care: 50 minutes including detailed discussion with ENT and speech pathology multiple times today.       Greg Boggs MD, MD

## 2020-12-04 NOTE — H&P
[facility]     History and Physical - Hospitalist Service       Date of Admission:  12/3/2020    Assessment & Plan   Karen Tee is a 64 year old female admitted on 12/3/2020. She presented to ENT clinic for evaluation of ear pain and was found to have respiratory distress and an unstable airway due to a laryngeal mass for which she underwent an emergent tracheostomy and is now admitted for further evaluation and cares.    Unstable airway, S/P emergency tracheotomy for assistance in breathing  Tracheostomy in place  Laryngeal mass  Neck mass  Underwent urgent tracheotomy with tracheostomy placement on 12/3 due to new finding of laryngeal mass causing unstable airway. Biopsies were taken during procedure.  - ENT placed all tracheostomy orders; for trach-specific questions contact ENT  - ENT consult for tracheostomy management  - NPO  - Speech consult, patient will need to pass swallow study before discharge; if fails would need a feeding tube  - Pathology pending  - Will need outpatient follow-up for cancer work-up  - Care Transitions consult to assist with obtaining necessary home equipment to manage a tracheostomy    Habitual alcohol use  Alcoholic cirrhosis with ascites  Per chart review she has frequent alcohol use, although she says not daily, last drink 3-4 days ago. Denies prior history of withdrawal or seizures. Does appear to have prior paracentesis due to cirrhosis and ascites. LFTs normal on admission.   - No evidence of withdrawal on admission, so no CIWA indicated, but monitor for evidence and initiate CIWA as appropriate  - CMP in am  - INR pending    Personal history of tobacco use, presenting hazards to health  Quit smoking 2 months ago. Has not been using nicotine replacement.    COVID status  Tested as asymptomatic COVID screen based on hospital admission criteria. No concern for active COVID infection on admission.  - COVID PCR pending  - No indication for COVID  precautions or repeat testing         Diet:   NPO  DVT Prophylaxis: Low Risk/Ambulatory with no VTE prophylaxis indicated  Palm Catheter: not present  Code Status: Full Code  - discussed with patient on admission         Disposition Plan   Expected discharge: 1-3 days, recommended to prior living arrangement once respiratory status remains stable, she clears swallow study, is set up with appropriate home tracheostomy supplies / equipment.  Entered: Concepción Palmer PA-C 12/04/2020, 1:35 AM     The patient's care was discussed with the Attending Physician, Dr. Hoang Boggs as well as Dr. Moy Mckeon, consulting physician Dr. Canales, Bedside Nurse, and Patient.    Concepción Palmer PA-C  [facility]   Contact information available via [facility] Paging/Directory      ______________________________________________________________________    Chief Complaint   Ear pain    History is obtained from the patient, review of EMR, and discussion with ENT Dr. Marshall Canales.    History of Present Illness   Karen Tee is a 64 year old female who presented to ENT clinic for evaluation of ear pain.    Patient unable to speak currently due to tracheostomy. She can answer yes/no questions and wrote a few things on a whiteboard. Otherwise history is from chart review and Dr. Canales.    Patient presented to clinic for evaluation of ear pain.   Pain had been present for about 1.5 months, initially bilateral, but then worse on the left.   She has also had some neck fullness and thought her glands were swollen. She has had some difficulty swallowing but no pain.     While in clinic it was noted that patient was having difficulty breathing and was short of breath. This had been present for the past 1.5 months and gradually worsening since onset.  She had been treated twice recently with prednisone, a Z-violetta, and albuterol without significant relief.     Dr. Canales performed a flex laryngoscopy and found a  large right-sided mass in the epiglottis, right aryepiglottic fold, and right arytenoid.  There was concern the mass was compressing the airway enough to make it unstable.  After discussion with various medical staff it was determined that it was safest to perform a tracheotomy / place a tracheostomy at [facility] rather than await bed availability and risk transfer with unstable airway to [facility].     Patient is now post- tracheostomy placement. She feels tired but denies pain. Denies respiratory distress. No recent illness, fever, chills. No cough. Recently quit smoking around the time her symptoms started, has not needed nicotine. Drinks frequently but not daily.     Review of Systems    The 10 point Review of Systems is negative other than noted in the HPI or here.     Past Medical History    I have reviewed this patient's medical history and updated it with pertinent information if needed.   Past Medical History:   Diagnosis Date     Alcohol dependence (H)      Chronic depressive personality disorder      Cirrhoses, liver     diagnosed 7/10     Tobacco dependence        Past Surgical History   I have reviewed this patient's surgical history and updated it with pertinent information if needed.  Past Surgical History:   Procedure Laterality Date     HC EXCISION BREAST LESION, OPEN >=1         Social History   I have reviewed this patient's social history and updated it with pertinent information if needed.  Social History     Tobacco Use     Smoking status: Former Smoker     Packs/day: 0.50     Types: Cigarettes     Quit date: 10/17/2020     Years since quittin.1     Smokeless tobacco: Never Used     Tobacco comment: has not smoked since 10/17/20   Substance Use Topics     Alcohol use: No     Comment: pt. has not had any alcohol since hospitalization 2018     Drug use: No     Comment: hx of alcohol abuse.       Family History   I have reviewed this patient's family  history and updated it with pertinent information if needed.  Family History   Problem Relation Age of Onset     Hypertension Mother      Depression Sister         and Anxiety.     Psychotic Disorder Sister         severe anxiety     Neurologic Disorder Brother          from brain tumor.       Prior to Admission Medications   Prior to Admission Medications   Prescriptions Last Dose Informant Patient Reported? Taking?   albuterol (PROAIR HFA/PROVENTIL HFA/VENTOLIN HFA) 108 (90 Base) MCG/ACT inhaler   No No   Sig: Inhale 6 puffs into the lungs every 6 hours as needed for shortness of breath / dyspnea or wheezing   albuterol (PROAIR HFA/PROVENTIL HFA/VENTOLIN HFA) 108 (90 Base) MCG/ACT inhaler   No No   Sig: Inhale 2 puffs into the lungs every 4 hours as needed for shortness of breath / dyspnea or wheezing   multivitamin w/minerals (THERA-VIT-M) tablet Past Week at Unknown time  Yes Yes   Sig: Take 1 tablet by mouth daily   predniSONE (DELTASONE) 10 MG tablet Unknown at Unknown time  No No   Sig: Take 3 tablets (30 mg) by mouth daily for 5 days   predniSONE (DELTASONE) 20 MG tablet Unknown at Unknown time  No No   Sig: Take 3 tabs by mouth daily x 3 days, then 2 tabs daily x 3 days, then 1 tab daily x 3 days, then 1/2 tab daily x 3 days.      Facility-Administered Medications: None     Allergies   Allergies   Allergen Reactions     Nka [No Known Allergies]        Physical Exam   Vital Signs: Temp: 98.2  F (36.8  C) Temp src: Axillary BP: 117/57 Pulse: 56   Resp: 13 SpO2: 97 % O2 Device: Trach dome Oxygen Delivery: 10 LPM  Weight: 145 lbs 0 oz    Constitutional: Alert, oriented, cooperative. No apparent distress, appears nontoxic. Unable to speak due to tracheostomy.    Eyes: Eyes are clear, pupils are reactive. No scleral icterus.    HEENT: Oropharynx is clear and moist. New tracheostomy in place with some bleeding around the incision site. Normocephalic, no evidence of cranial trauma.      Cardiovascular: Regular  rhythm and rate, normal S1 and S2. No murmur, rubs, or gallops. Peripheral pulses intact bilaterally. No lower extremity edema.    Respiratory: Lung sounds are clear to auscultation bilaterally without wheezes, rhonchi, or crackles. Breathing is non-labored through her tracheostomy.    GI: Soft, non-distended. Non-tender, no rebound or guarding. No hepatosplenomegaly or masses appreciated. Normal bowel sounds.     Musculoskeletal: Without obvious deformity, normal range of motion. Normal muscle bulk and tone. Distal CMS intact.      Skin: Warm and dry, no rashes or ecchymoses. No mottling of skin.      Neurologic: Patient moves all extremities.  is symmetric. Gross strength and sensation are equal bilaterally.    Genitourinary: Deferred      Data   Data reviewed today: I reviewed all medications, new labs and imaging results over the last 24 hours. I personally reviewed the neck CT image(s) showing large right sided laryngeal mass.    Recent Labs   Lab 12/03/20  2220   WBC 9.3   HGB 12.1   MCV 95         POTASSIUM 4.1   CHLORIDE 105   CO2 26   BUN 6*   CR 0.44*   ANIONGAP 6   LYN 8.6   *   ALBUMIN 2.7*   PROTTOTAL 6.8   BILITOTAL 0.5   ALKPHOS 140   ALT 15   AST 28     Recent Results (from the past 24 hour(s))   CT Soft Tissue Neck w Contrast    Narrative    CT OF THE NECK WITH CONTRAST  12/3/2020 2:59 PM     COMPARISON: None    HISTORY: Neck mass, solitary, afebrile; concern for squamous cell  carcinoma metastatic to the neck; Laryngeal mass; Neck mass; Shortness  of breath; Personal history of tobacco use, presenting hazards to  health    TECHNIQUE:  Axial CT images of the neck were acquired after the  intravenous administration of 80 mL Isovue-370 nonionic iodinated  contrast material. Coronal reconstructions were created.    FINDINGS:     Large neoplasm measuring at least 4.2 x 3.9 x 3.4 cm, centered within  the supraglottic larynx with contiguous involvement of the  bilateral  aryepiglottic folds, epiglottis, inferior tonsillar pillars and tongue  base with involvement of the floor of mouth and intrinsic tongue  musculature.  There are large conglomerate, necrotic lymph nodes  bilaterally, largest measuring 5.0 x 4.7 cm on the right and 2.4 x 4.6  cm on the left. There is encasement of the hyoid bone. There is  involvement of the strap musculature bilaterally and extension in the  lateral and anterior neck. There is contiguous involvement with the  posterior aspect of the right submandibular gland with absence of  interval fat plane as well as involvement of the right  sternocleidomastoid muscle. There may be invasion of the left  sternocleidomastoid muscle. There is encasement with  compression/occlusion of the right internal jugular vein and  encasement with severe stenosis of the left internal jugular vein.  There are dilated venous collaterals within the neck.    No definite involvement of the glottis or subglottic larynx. No  involvement of the prevertebral musculature.    Thyroid gland is unremarkable. Parotid glands are unremarkable.  Visualized lung apices are unremarkable. No destructive osseous  lesion.      Impression    IMPRESSION:  1. Large necrotic neoplasm centered within the supraglottic larynx  with contiguous involvement of the epiglottis, base of tongue and  floor of mouth, aryepiglottic folds and inferior tonsillar pillars.  Large bilateral conglomerate and necrotic lymph node masses with  extracapsular extension and invasion of adjacent structures as  described.    Radiation dose for this scan was reduced using automated exposure  control, adjustment of the mA and/or kV according to patient size, or  iterative reconstruction technique    BURTON MERRITT MD

## 2020-12-04 NOTE — PROGRESS NOTES
Video Swallow Study  12/04/20    General Information   Onset of Illness/Injury or Date of Surgery 12/03/20   Referring Physician Marshall Canales MD   Patient/Family Therapy Goal Statement (SLP) To be able to eat/drink.   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   SLP spoke with ENT today and he stated goal for pt to be able to return home from hospitalization and due to baseline health from outpatient clinic visit has a higher prognosis for being able to swallow a modified diet.  ENT in agreement with SLP request for Video Swallow study due to high risk of silent aspiration s/p trach, supraglottic laryngeal bioipsies, and known mass.    General Observations Pt is alert and cooperative.  Communicates via dry erase board.  Respiratory Therapist present during video swallow study.   Past History of Dysphagia No dysphagia prior to surgery 12/3/20   Disability/Function   Hearing Difficulty or Deaf no   Wear Glasses or Blind no   Difficulty Communicating   (uses dry erase board WNL)   Difficulty Eating/Swallowing other (see comments)   Pain Assessment   Patient Currently in Pain No   Type of Evaluation   Type of Evaluation Swallow Evaluation   Oral Motor   Oral Musculature generally intact   Mucosal Quality good   Dentition (Oral Motor)   Dentition (Oral Motor) natural dentition   Facial Symmetry (Oral Motor)   Facial Symmetry (Oral Motor) WNL   Lip Function (Oral Motor)   Lip Range of Motion (Oral Motor) WNL   Tongue Function (Oral Motor)   Tongue ROM (Oral Motor) WNL   Tongue Coordination/Speed (Oral Motor) WNL   Comment, Tongue Function (Oral Motor) WFL   Jaw Function (Oral Motor)   Jaw Function (Oral Motor) WNL   Comment, Jaw Function (Oral Motor) WFL   Vocal Quality/Secretion Management (Oral Motor)   Vocal Quality (Oral Motor) other  (see comments)   General Swallowing Observations   Current Diet/Method of Nutritional Intake (General Swallowing Observations, NIS) NPO   Respiratory Support (General Swallowing Observations) other (see comments);none  (no O2 needs.  )   Swallowing Evaluation Videofluoroscopic swallow study (VFSS)   VFSS Evaluation   Radiologist Dr. Lopez   Views Taken left lateral   Physical Location of Procedure [facility]   VFSS Textures Trialed Pudding Thick Liquids;Honey-Thick Liquids;Nectar-Thick Liquids   VFSS Eval: Thin Liquid Texture Trial   Diagnostic Statement DNT due to high aspiration risk.   VFSS Evaluation: Nectar Thick Liquid Texture Trial   Mode of Presentation, Nectar cup   Order of Presentation 1 (5 ml), 2, 3 pt fed small sips   Preparatory Phase WFL   Oral Phase, Nectar WFL   Pharyngeal Phase, Nectar Residue in valleculae;Residue in pyriform sinus   Rosenbek's Penetration Aspiration Scale: Nectar-Thick Liquid Trial Results 8 - contrast passes glottis, visible subglottic residue remains, absent patient response (aspiration)   Response to Aspiration, Nectar absent response, silent aspiration   Diagnostic Statement Piecemeal swallows.  Deep penetration present via small sip from cup which subsequently aspirates with no cough response.  Poor epiglottic movement and mass noted in vallaculae.   VFSS Evaluation: Honey Thick Liquid Texture Trial   Mode of Presentation, Honey spoon;fed by clinician   Order of Presentation 4   Preparatory Phase WFL   Oral Phase, Honey WFL   Pharyngeal Phase, Honey Residue in pyriform sinus   Rosenbek's Penetration Aspiration Scale: Honey Trial Results 5 - contrast contacts vocal cords, visible residue remains (penetration)   Diagnostic Statement Penetration with inabilty to clear.  Post swallow residue in pharynx.   VFSS Evaluation: Pudding Thick Liquid Texture Trial   Mode of Presentation, Pudding spoon;fed by clinician   Order of Presentation 5   Preparatory Phase  WFL   Oral Phase, Pudding WFL   Pharyngeal Phase, Pudding Residue in pyriform sinus   Diagnostic Statement Piecemeal swallows with penetration present with pudding consistency.  Decreased pharyngeal squeeze to clear pharynx with residue infront of airway.   Swallowing Recommendations   Diet Consistency Recommendations NPO   Comment, Swallowing Recommendations Pt was assessed with nectar, honey, and pudding consistencies.  Oral phase WFL.  Pharyngeal phase characterized by decreased epiglottic movement and poor laryngeal excursion during the swallow with mass in vallaculae.  Pt has piecemeal swallows and with thicker consistencies unable to fully clear pharynx of residue with multiple swallows.  Pharyngeal residue at airway.  Penetration which subsequently silently aspirates is noted with nectar consistency liquids. Both honey and pudding consistencies also penetrate with patient not able to clear.  Diagnosis: severe dysphagia.  Recommend NPO due to retention in pharynx pt unable to effectively clear,  aspiration with no reflexive cough   SLP Therapy Assessment/Plan   SLP Diagnosis Severe pharyngeal dysphagia    Rehab Potential (SLP Eval) fair, will monitor progress closely   Comment, Therapy Assessment/Plan (SLP) Recommend alternative means of nutrition and hydration pending medical care plan.   Therapy Plan Review/Discharge Plan (SLP)   Therapy Plan Review (SLP) evaluation/treatment results reviewed   SLP Discharge Planning    SLP Rationale for DC Rec Pt is not safe for nutrition and hydration via oral means at this time.     SLP Brief overview of current status  Recommend repeat video swallow study (outpatient) once patient has a plan for medical treatment with cancer diagnosis.    Total Evaluation Time   Total Evaluation Time (Minutes) 45  (15 bedside, 30 video swallow)

## 2020-12-04 NOTE — ANESTHESIA POSTPROCEDURE EVALUATION
Patient: Karen Tee    Procedure(s):  AWAKE FIBEROPTIC INTUBATION, OPEN TRACHEOSTOMY, DIRECT LARYNGOSCOPY WITH BIOPSY    Diagnosis:Laryngeal mass [J38.7]  Neck mass [R22.1]  Shortness of breath [R06.02]  Personal history of tobacco use, presenting hazards to health [Z87.891]  Diagnosis Additional Information: No value filed.    Anesthesia Type:  General, Trach    Note:  Anesthesia Post Evaluation    Patient participation: Able to fully participate in evaluation  Level of consciousness: awake  Pain management: adequate  Airway patency: patent  Cardiovascular status: acceptable  Respiratory status: acceptable  Hydration status: stable     Anesthetic complications: None          Last vitals:  Vitals:    12/04/20 0200 12/04/20 0300 12/04/20 0400   BP: 117/59 133/68 117/65   Pulse: 70 62 64   Resp: 11 9 11   Temp:   36.7  C (98  F)   SpO2: 97% 96% 95%         Electronically Signed By: LEX Ordaz CRNA  December 4, 2020  4:22 AM

## 2020-12-04 NOTE — CONSULTS
Care Management Initial Consult    General Information  Assessment completed with: Karen Alvarado  Type of CM/SW Visit: Initial Assessment  Primary Care Provider verified and updated as needed: Yes   Readmission within the last 30 days:        Reason for Consult: discharge planning  Advance Care Planning:     NA     Communication Assessment  Patient's communication style: spoken language (English or Bilingual)(Pt used white board to answer SW questions)    Hearing Difficulty or Deaf: no   Wear Glasses or Blind: no    Cognitive  Cognitive/Neuro/Behavioral: WDL  Level of Consciousness: alert        Mood/Behavior: calm;cooperative  Best Language: (able to write on white board for communication)  Speech: trached    Living Environment:   People in home: alone     Current living Arrangements: apartment      Able to return to prior arrangements: yes     Family/Social Support:  Care provided by: self  Provides care for: no one  Marital Status:   Children;Sibling(s)        Pt reports that her 2 sons, sister & brother all live locally.  Description of Support System: Supportive;Involved    Support Assessment: Adequate family and caregiver support;Adequate social supports    Current Resources:   Skilled Home Care Services:  None yet  Community Resources: None  Equipment currently used at home: none  Supplies currently used at home: None    Employment/Financial:  Employment Status: disabled        Financial Concerns: No concerns identified   Lifestyle & Psychosocial Needs:  Socioeconomic History     Marital status:      Spouse name: Not on file     Number of children: Not on file     Years of education: Not on file     Highest education level: Not on file     Tobacco Use     Smoking status: Former Smoker     Packs/day: 0.50     Types: Cigarettes     Quit date: 10/17/2020     Years since quittin.1     Smokeless tobacco: Never Used     Tobacco comment: has not smoked since 10/17/20   Substance and Sexual Activity  "    Alcohol use: No     Comment: pt. has not had any alcohol since hospitalization 2018     Drug use: No     Comment: hx of alcohol abuse.     Sexual activity: Not Currently     Partners: Male     Functional Status:  Prior to admission patient needed assistance: None    Mental Health Status:  NA        Chemical Dependency Status:  Chemical Dependency Status: No Current Concerns   Pt reports she has not drank for previous several days.        Values/Beliefs:  Spiritual, Cultural Beliefs, Sikhism Practices, Values that affect care:       NA        Additional Information:  SW met with pt today to discuss hospitalization & discharge planning.  SW and pt discussed 2 plans of care for dischargin. Go home with [facility] ([phone] Fax: [phone]) vs.  2.  Go to a TCU for further education, teaching & stability for her medical plan of care.  Referrals not sent today, as pt hopes to discharge to home.      Pt requests that SW work with [facility] ([phone] Fax: [phone]) as she prefers to stay in network with her PCP.  SW sent referral to [facility] today & also had discussion with a [facility] RN who shared, \"The patient would just need to be made acutely aware that we would be there to reinforce the teaching that she has hopefully already been given in the hospital prior to discharge on her trach care (cleaning, dressing changes, suctioning, lavage, etc).  We will not have capacity to visit her daily, or even multiple times per week to perform her care for her - she will need to be independent with this care and/or have dependable, easily accessible support available to her to help with this possible multiple times per day (sometimes at the drop of a hat).  I don t mean to be dramatic, but that is simply the reality  of having a new trach while living alone, especially in these times.\"  [facility] responded that hospital RN staff will be educating & documenting pt's cares over the next " "several days, which will assist us in creating a safe plan of care for the pt.    CTS staff also instructed that [facility] will need to order the following supplies for the pt upon discharge, per MD Canales's note today, \"The patient will need trach teaching, trach supplies, and evaluation to see if she would need some home health given that she lives alone.  The patient needs the following supplies for home:  - An extra #6 cuffless Shiley tracheostomy tube with disposable inner cannula  - Multiple disposable inner cannulas for a #6 Shiley tracheostomy tube (enough for roughly 1 a day, 30-month, etc.)  - 10 or 12 Fr suction catheters for trach cares (enough for 3-4 per day)  - Multiple saline jets (3-5 mL) for tracheal lavage (3-4 per day)  - Portable suction machine  - Soft reusable Velcro trach ties\"    CTS to continue to follow for discharge planning needs & supplies.    LUTHER Ramesh      "

## 2020-12-04 NOTE — PLAN OF CARE
Major Shift Events: Patient was alert, made needs known/ communicated via white board, and used call light appropriately. Intermittently sinus regla. Normotensive. Afebrile. Clear lung sounds. Productive cough. Suctioned trach q4 hours with minimal thin clear secretions. Dilaudid given x1 for pain and reported adequate relief. Up to bedside commode with stand by assist of two people and a gait belt, but required minimal assistance. Was steady with ambulation to commode. Urinated once overnight.     Plan: Swallow study, begin trach teaching. Continue to monitor and notify MD of any changes    For vital signs and complete assessments, please see documentation flowsheets.

## 2020-12-04 NOTE — OP NOTE
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     POSTOPERATIVE DIAGNOSES: Same.     PROCEDURE PERFORMED:   1. Awake fiberoptic orotracheal intubation.   2. Open tracheostomy  3. MicroDirect laryngoscopy with biopsy of supraglottic laryngeal mass neck mass.     SURGEON: Marshall Canales MD     ASSISTANTS: None.    BLOOD LOSS: 10 mL.    COMPLICATIONS: None.     SPECIMENS: Supraglottic mass for permanent pathology.    ANESTHESIA: General.    GRAFTS, IMPLANTS, DRAINS: Cuffed #6 Shiley endotracheal tube    INDICATIONS: Obtain diagnosis, treatment.    FINDINGS:   1. Large supraglottic laryngeal mass involving the tongue base, epiglottis, both aryepiglottic folds, both arytenoids extending near the glottic area.  Difficult to tell if this is transglottic..    OPERATIVE TECHNIQUE: The patient was brought to the operating room and identified by name clinic number.  Patient was placed upright on the operating room table at roughly 60 degrees given her significant dyspnea and stridor.  I placed 4% lidocaine in the nose and oropharynx.  Atomizer was used to anesthetize the tongue base, supraglottic larynx, and larynx.  Using the Ambu disposable 4 mm flexible fiberoptic laryngoscope with a 5 oh endotracheal tube over the scope, an awake fiberoptic intubation was successfully completed transversing with a large supraglottic laryngeal tumor.  Visualization of the tracheal rings was excellent.  The tube was passed through the glottis and into the trachea.  The balloon was inflated.  The tube was secured.  End-tidal CO2 was confirmed.  Given the large laryngeal tumor and the likely need for trach and lack of cancer diagnosis, the decision was made to move forward with placement of a tracheostomy tube.      The patient was placed supinely on the operating room table and general endotracheal anesthesia was induced by the anesthesia service.  The patient was  prepped and draped in standard fashion.  A 3 cm tracheostomy incision was marked 2 fingerbreadths above the sternal notch and the incision was injected with 1% lidocaine 1:100,000 epinephrine.  After standard surgical pause, a 15 blade was used to make an incision through the skin and subcutaneous tissues.  The strap musculature was identified and divided at the midline.  The thyroid was identified and retracted inferiorly.  Points of bleeding were addressed with bipolar cautery.  Dissection was taken down to the trachea.  Given the patient's large laryngeal tumor, the decision was made to place a high tracheostomy without a Monica flap.  Tracheotomy incision was made using a 15 blade after the endotracheal tube cuff was deflated.  A #6 cuffed Shiley was placed through the tracheostomy.  End-tidal CO2 was confirmed.  Tracheostomy tube was secured with 2-0 silk sutures in a four-quadrant fashion and trach ties.     Next, a Dedo laryngoscope was used to examine the supraglottic structures.  Care was taken not to injure the teeth, tongue, lips, gums with insertion.  Several biopsies were taken of the epiglottic and base of tongue portion of the tumor and sent for permanent pathologic analysis.  Hemostasis was assured.      This marked the end of the procedure.  The patient was then turned over to anesthesia who assisted in transfer to the ICU for monitoring.  There were no complications.  There was minimal blood loss.  All standard operating room protocol and universal precautions were used throughout the procedure.    Marshall Canales MD  Department of Otolarygology-Head and Neck Surgery  [facility]

## 2020-12-04 NOTE — ANESTHESIA PREPROCEDURE EVALUATION
Anesthesia Pre-Procedure Evaluation    Patient: Karen Tee   MRN: [id number] : 1955          Preoperative Diagnosis: Laryngeal mass [J38.7]  Neck mass [R22.1]  Shortness of breath [R06.02]  Personal history of tobacco use, presenting hazards to health [Z87.891]    Procedure(s):  AWAKE FIBEROPTIC INTUBATION, POSSIBLE AWAKE TRACHEOSTOMY    Past Medical History:   Diagnosis Date     Alcohol dependence (H)      Chronic depressive personality disorder      Cirrhoses, liver     diagnosed 7/10     Tobacco dependence      Past Surgical History:   Procedure Laterality Date     HC EXCISION BREAST LESION, OPEN >=1         Anesthesia Evaluation     . Pt has had prior anesthetic.            ROS/MED HX    ENT/Pulmonary:     (+)tobacco use, Current use , . .    Neurologic:       Cardiovascular:     (+) Dyslipidemia, ----. : . . . :. .       METS/Exercise Tolerance:     Hematologic:         Musculoskeletal:         GI/Hepatic:     (+) liver disease, Other GI/Hepatic       Renal/Genitourinary:         Endo:         Psychiatric:     (+) psychiatric history depression      Infectious Disease:         Malignancy:         Other:                          Physical Exam  Normal systems: cardiovascular and dental    Airway   Mallampati: I  TM distance: >3 FB  Neck ROM: full    Dental     Cardiovascular   Rhythm and rate: regular      Pulmonary    breath sounds clear to auscultation    Other findings: See Dr. Canales's airway assessment.        Lab Results   Component Value Date    WBC 11.8 (H) 2020    HGB 13.6 2020    HCT 39.8 2020     2020     (L) 2020    POTASSIUM 3.2 (L) 2020    CHLORIDE 98 2020    CO2 25 2020    BUN 8 2020    CR 0.55 2020    GLC 84 2020    LYN 9.3 2020    PHOS 3.4 2010    MAG 2.1 02/10/2020    ALBUMIN 2.8 (L) 02/10/2020    PROTTOTAL 8.3 02/10/2020    ALT 23 02/10/2020     (H) 02/10/2020     (H)  "07/25/2005    ALKPHOS 241 (H) 02/10/2020    BILITOTAL 0.7 02/10/2020    LIPASE 89 08/25/2010    AMYLASE 54 08/25/2010    SILVIA 10 02/10/2020    PTT 37 02/10/2020    INR 1.34 (H) 02/10/2020    TSH 2.95 06/12/2018    T4 1.56 (H) 06/11/2018       Preop Vitals  BP Readings from Last 3 Encounters:   12/03/20 119/72   11/04/20 119/80   03/10/20 (!) 144/77    Pulse Readings from Last 3 Encounters:   12/03/20 99   12/03/20 104   11/04/20 115      Resp Readings from Last 3 Encounters:   11/04/20 12   02/10/20 18   07/06/18 16    SpO2 Readings from Last 3 Encounters:   12/03/20 97%   12/03/20 97%   11/04/20 96%      Temp Readings from Last 1 Encounters:   12/03/20 37.1  C (98.8  F) (Oral)    Ht Readings from Last 1 Encounters:   12/03/20 1.6 m (5' 3\")      Wt Readings from Last 1 Encounters:   12/03/20 65.8 kg (145 lb)    Estimated body mass index is 25.69 kg/m  as calculated from the following:    Height as of this encounter: 1.6 m (5' 3\").    Weight as of this encounter: 65.8 kg (145 lb).       Anesthesia Plan      History & Physical Review  History and physical reviewed and following examination; no interval change.    ASA Status:  4 .    NPO Status:  > 6 hours    Plan for General and MAC with Intravenous induction. Maintenance will be Balanced.  Reason for MAC:  Deep or markedly invasive procedure (G8)  PONV prophylaxis:  Ondansetron (or other 5HT-3) and Dexamethasone or Solumedrol  Additional equipment: Videolaryngoscope and Fiberoptic bronchoscope   The patient is a current Smoker and Patient was instructed to abstain from smoking on day of procedure     Postoperative Care  Postoperative pain management:  IV analgesics and Oral pain medications.      Consents  Anesthetic plan, risks, benefits and alternatives discussed with:  Patient..                 LEX Ricketts CRNA  "

## 2020-12-04 NOTE — PROGRESS NOTES
Comfort measures explained and discussed:   · OTC Tylenol/ibuprofen as needed. · Push fluids- warm or cool liquids, whichever is soothing for patient. · Avoid caffeine. · Do not share utensils or drinks with anyone. · Good handwashing.    · Get ple Pt had a moment of panic with increased coughing and secretions. Feeling like she could not breathe. Gentle suctioning and reassurance given. Site is clean with minimal secretions from around tach site.. Pt now up in a lounge chair. O2 with humidity at 28% in place.   Pt is calm, texting on phone... Denies discomfort. VSS..     Supplies are being ordered for continued trac care..  Obturator taped at [facility]..

## 2020-12-04 NOTE — PROGRESS NOTES
The patient is postoperative day 1 following awake fiberoptic intubation, open tracheostomy, direct laryngoscopy with biopsy for likely large transglottic laryngeal squamous cell carcinoma.  Patient is afebrile.  She is breathing well on trach collar.  Minimal tracheal secretions.  No significant bleeding from the trach site.  The patient's trach cuff is up.    On exam, the patient is alert and oriented.  She has a #6 cuffed Shiley tracheostomy tube secured with suture and trach ties.  I did deflate the patient's tracheostomy cuff and suction her airway.  The patient was able to occlude the trach and phonate.  The trach site looks healthy without any signs of breakdown or infection.    The patient is doing well.  She definitely has a complex situation.  She has an obvious advanced laryngeal carcinoma with bilateral neck disease and involvement of the tongue base.  I think she will likely require total glossectomy/total laryngectomy with postoperative chemoradiation.  Patient has several barriers to discharge at the current time.  The patient will need confirmation that she has a safe swallow prior to discharge.  If not, she will need an NG feeding tube.  I discussed the patient with speech-language pathology today.  They are not currently comfortable with a bedside swallow but are considering a video swallow study potentially later today.  If she fails, she would need a feeding tube.    The patient will need trach teaching, trach supplies, and evaluation to see if she would need some home health given that she lives alone.    The patient needs the following supplies for home:  - An extra #6 cuffless Shiley tracheostomy tube with disposable inner cannula  - Multiple disposable inner cannulas for a #6 Shiley tracheostomy tube (enough for roughly 1 a day, 30-month, etc.)  - 10 or 12 Fr suction catheters for trach cares (enough for 3-4 per day)  - Multiple saline jets (3-5 mL) for tracheal lavage (3-4 per day)  -  Portable suction machine  - Soft reusable Velcro trach ties    Depending on the results of the swallow study, she also may need feeding tube supplies.  We can make that determination after the results of her swallow evaluation.  If there are any direct questions, you can contact me.  For any urgent or emergent concerns, you can contact ENT on call.    Marshall Canales MD  Department of Otolarygology-Head and Neck Surgery  [facility] [place]

## 2020-12-05 NOTE — PROGRESS NOTES
Care Management Follow Up    Length of Stay (days): 2    Expected Discharge Date: 12/07/20     Concerns to be Addressed: discharge planning  Patient plan of care discussed at interdisciplinary rounds: Yes; pt with new trach, ng tube due to aspirating & peg tube placement on 11-7-20.    Anticipated Discharge Disposition: Home Care;Transitional Care, ARU= TBD based on pt's needs at discharge.     Anticipated Discharge Services:  RN, RT, ST, & ?  Anticipated Discharge DME: Oxygen, feeding supplies    Patient/family educated on Medicare website which has current facility and service quality ratings: yes  Education Provided on the Discharge Plan:  Yes  Patient/Family in Agreement with the Plan: yes    Referrals Placed by CM/SW: Internal Clinic Care Coordination;Post Acute Facilities;Homecare, ARU    Private pay costs discussed: Not today.    Additional Information:  CTS continues to follow pt for discharge planning needs.  Per today's EMR notes, pt with failed video swallow; remains NPO with new ng tube for nutrition.  Possible peg tube placement on 12-7-20.    MAXWELL discussed at length with pt discharge plans & how I would like to have several plans in place & decide on a final plan closer to discharge.  1.  Home with [facility].  Lengthy discussion on HC availability & that preference is for pt to have someone stay with her to also be taught her cares.  Pt unable to have anyone stay with her, but will think about whose home she can discharge to.  Discussed the importance of the family/friend being involved in daily care needs & have the ability to assist her with cares.  2.  [facility], [phone].  MAXWELL spoke with Nael in admissions.  Pt must have 2 intensive therapy needs in 2 areas for about 5 days.  Currently pt has ST needs.  MAXWELL discussed with MD & requested PT/OT orders be placed.  Nael to review pt's EMR on 12-6-20 and will discuss with writer.  3.  TCU cares; pt is not pleased with this idea & prefers option 1 or  2 before referrals are placed.    MAXWELL also spoke with PT, Smita, and explained reason for PT eval.    If pt discharges to home, MD staff is aware that CTS staff need prescriptions for the items below & that  CTS will work on securing the following Tracheostomy supplies from DME agency:  0.9% sodium chloride 1000 mL bottles   Box split 4x4 gauze sponges   Trach kits with brushes   Velcro trach ties   3 cc 0.9% sodium chloride lavages for trach suctioning   Large gloves   Cool mist humidity via trach dome (can also be heated humidity, some suppliers only do heated)  Portable suction machine  Suction cath kits (12-14 Tristanian)    Will also need to coordinate pt's tube feeding plan of care prior to discharge.      LUTHER Ramesh

## 2020-12-05 NOTE — PROGRESS NOTES
Patient has small amounts of serosanguineous drainage from trach site, and  Sutures are intact. Trach site cares performed at least every 4 hrs and prn. Skin prep and mepilex placed to trach site. Patient continues on trach dome at 28%, and O2 sats remains >92%.

## 2020-12-05 NOTE — PROGRESS NOTES
Pt is resting comfortably in bed. New NGT is in place. Xray done to confirm placement. Dietary consult sent for tube feeding orders.. Tracheostomy is patent. Minimal secretions. Inner cannula changed out. Humidified o2 @28 % trach mask.

## 2020-12-05 NOTE — PROGRESS NOTES
Trach dome at 28 % taken off per pt request. After pt fell asleep, o2 saturation dropped to 88 %.. Pt placed back on trach dome with improved saturation immediately...

## 2020-12-05 NOTE — CONSULTS
CLINICAL NUTRITION SERVICES - ASSESSMENT NOTE     Nutrition Prescription    RECOMMENDATIONS FOR MDs/PROVIDERS TO ORDER:  -MD to change to non-dextrose IVF once TF starts  -MD to adjust IVF as TF advances towards goal    Malnutrition Status:    Patient does not meet two of the established criteria necessary for diagnosing malnutrition but is at risk for malnutrition    Recommendations already ordered by Registered Dietitian (RD):  Once placement is verified and tube can be used safely, recommend the following TF regimen:    1) Isosource 1.5 via NG tube at 20 mL/hr x 24 hrs.  This rate will meet 44% calorie needs and 48% protein needs. Recheck refeeding labs and overall TF tolerance 12/6. RD to update orders.    2) Water flushes of 30 mL q 4 hrs for tube patency.     ADDENDUM 1027 12/5: RD to place orders to start feeds.    Future/Additional Recommendations:  --RD to adjust water flushes as TF advances towards goal rate, pending IVF.  -suspect patient will need to transition to bolus feeds once PEG placed, pending discharge plan. RD to monitor.     ADDENDUM 1027 12/5: Discussed placement w/ Dr. Boggs. Tube is at least in the stomach, and interpretation is that the tip of the tube is in the duodenum and safe to use. IVF also changed to non-dextrose LR @ 100 mL/hr.    REASON FOR ASSESSMENT  Karen Tee is a/an 64 year old female assessed by the dietitian for Provider Order - Registered Dietitian to Assess and Order TF per Medical Nutrition Therapy Protocol.    Patient presented to clinic for evaluation of ear pain and was found to have respiratory distress and an unstable airway due to a laryngeal mass for which she underwent an emergent tracheostomy 12/3/20. Biopsy pending. PMH is significant for alcoholic cirrhosis with ascites (patient denies daily use, but has had previous paracentesis due to cirrhosis and ascites), and quit smoking tobacco 2 months ago. Patient had NG tube placement 12/4 after failing all  "consistencies on swallow study. High risk for aspiration. Per radiology results, tip of the feeding tube is projected over the proximal duodenum. Nonobstructive bowel gas pattern. Waiting to hear back from MD if tube is ok to use. Plan for PEG tube early next week. Patient lives alone but family lives locally.    NUTRITION HISTORY  Obtained from patient (communicates via whiteboard)  -normal intake is 2-3 meals/day with good appetite, but intake does vary. Takes a daily MVITM.  -ear pain started about 2 months ago, and this is when her appetite started to decline. Since then, she's been eating about 25% of her 2-3 meals/day.    CURRENT NUTRITION ORDERS  Diet: None - plan to start TF  Intake/Tolerance: none since admission (LOS day 2). Normoactive BS in all quadrants, abdomen distended.    LABS  Na 139 (WNL)  K+ 3.6 (WNL)  Phos pending  Mag pending  Creatinine 0.47 (L)  GFR > 90  BUN 11 (WNL)    12/3:  Alk Phos 140 (WNL)  ALT 15 (WNL)  AST 28 (WNL)    MEDICATIONS  IVF: D5 in LR @ 125 mL/hr    ANTHROPOMETRICS  Height: 160 cm (5' 2.992\")  Most Recent Weight: 70.1 kg (154 lb 8.7 oz)    IBW: 50 kg (140% IBW)  BMI: Overweight BMI 25-29.9  Weight History:   Wt Readings from Last 10 Encounters:   12/05/20 70.1 kg (154 lb 8.7 oz)   11/04/20 65.8 kg (145 lb)   03/10/20 64.9 kg (143 lb)   02/18/20 64.4 kg (142 lb)   07/06/18 64.1 kg (141 lb 6.4 oz)   06/18/18 65.6 kg (144 lb 10 oz)   06/11/18 73.5 kg (162 lb)   03/09/12 51.7 kg (114 lb)   12/13/10 51.3 kg (113 lb)   08/23/10 53.5 kg (118 lb)   -weight appears to be steady over the last 10 months.  -patient denies any unintentional weight loss. Does not know her UBW, but when RD informed her that weight has been ~ 140-145 lbs she said this sounds about right.    Vitals:    12/03/20 1703 12/04/20 0700 12/05/20 0500   Weight: 65.8 kg (145 lb) 68 kg (149 lb 14.6 oz) 70.1 kg (154 lb 8.7 oz)   -daily weights are ordered  -suspect weight is trending up due to IVF infusing since " admission or fluid retention with cirrhosis. Will use admit weight of 66 kg as dosing weight.    Dosing Weight: 66 kg (appears to be UBW)    ASSESSED NUTRITION NEEDS  Estimated Energy Needs: 7301-1761 kcals/day (25 - 30 kcals/kg)  Justification: Maintenance  Estimated Protein Needs: 66-79 grams protein/day (1 - 1.2 grams of pro/kg)  Justification: Maintenance  Estimated Fluid Needs: (1 mL/kcal)   Justification: Per provider pending fluid status    PHYSICAL FINDINGS  No abnormal nutrition-related physical findings observed.     MALNUTRITION  % Intake: </=75% for >/= 1 month (severe)  % Weight Loss: None noted  Subcutaneous Fat Loss: None observed  Muscle Loss: None observed  Fluid Accumulation/Edema: None noted  Malnutrition Diagnosis: Patient does not meet two of the established criteria necessary for diagnosing malnutrition but is at risk for malnutrition.    NUTRITION DIAGNOSIS  Inadequate oral intake related to poor appetite secondary to ear pain as evidenced by patient report of eating 25% of 2-3 meals/day over the last 2 months.      INTERVENTIONS  Implementation  Nutrition Education: Provided education on role of RD in tube feeding care.   Collaboration with other providers  Enteral Nutrition - Initiate  Feeding tube flush    Once placement is verified and tube can be used safely, recommend the following TF regimen:    1) Isosource 1.5 via NG tube at 20 mL/hr x 24 hrs.  This rate will meet 44% calorie needs and 48% protein needs. Recheck refeeding labs and overall TF tolerance. If tolerating and labs are WNL, advance TF rate by 10 mL/hr q 8 hrs to goal rate of 50 mL/hr x 24 hrs. Do not advance TF if lytes < WNL. Isosource 1.5 @ goal 50 ml/hr (1200 ml/day) to provide 1800 kcals (27 kcal/kg/day), 82 g PRO (1.2 g/kg/day), 211 g CHO, 71 g Fat, 18 g Fiber and 912 mL free water daily. This will meet 100% of calorie and protein needs.    2) Water flushes of 30 mL q 4 hrs for tube patency.       -MD to change to  non-dextrose IVF once TF starts  -MD to adjust IVF as TF advances towards goal rate.  -RD to adjust water flushes as TF advances towards goal rate, pending IVF.  -suspect patient will need to transition to bolus feeds once PEG placed, pending discharge plan. RD to monitor.      Goals  Total avg nutritional intake to meet a minimum of 25 kcal/kg and 66 g PRO/kg daily (per dosing wt 66 kg).     Monitoring/Evaluation  Progress toward goals will be monitored and evaluated per protocol.    Ana Maria Daniel RDN, LD  Clinical Dietitian  Office: [phone]  Weekend Pager: [phone]

## 2020-12-05 NOTE — PROGRESS NOTES
[facility]           Assessment & Plan         Kraen Tee is a 64 year old female admitted on 12/3/2020. She presented to ENT clinic for evaluation of ear pain and was found to have respiratory distress and an unstable airway due to a laryngeal mass for which she underwent an emergent tracheostomy and is now admitted for further evaluation and cares.     Unstable airway, S/P emergency tracheotomy for assistance in breathing  Tracheostomy in place  Laryngeal mass  Neck mass  12/3/20 -- Underwent urgent tracheotomy with tracheostomy placement on 12/3 due to new finding of laryngeal mass causing unstable airway. Biopsies were taken during procedure.  - ENT placed all tracheostomy orders; for trach-specific questions contact ENT  - ENT consult for tracheostomy management  - NPO  - Speech consult, patient will need to pass swallow study before discharge; if fails would need a feeding tube  - Pathology pending  - Will need outpatient follow-up for cancer work-up  - Care Transitions consult to assist with obtaining necessary home equipment to manage a tracheostomy  12/4/2020 -- doing well so far - see ENT note for details/plan - likely replacing trach on Monday with possible discharge that pm or Tues.    12/5/2020 -- ordering supplies as below per ENT recommendations.  Feeding tube placed yesterday as below.       Per ENT the patient needs the following supplies for home:  - An extra #6 cuffless Shiley tracheostomy tube with disposable inner cannula  - Multiple disposable inner cannulas for a #6 Shiley tracheostomy tube (enough for roughly 1 a day, 30-month, etc.)  - 10 or 12 Fr suction catheters for trach cares (enough for 3-4 per day)  - Multiple saline jets (3-5 mL) for tracheal lavage (3-4 per day)  - Portable suction machine  - Soft reusable Velcro trach ties     Aspiration on swallow evaluation  12/4/2020 -- failed all consistencies on video swallow evaluation -   12/5/2020 -- ENT  placed feeding tube yesterday, nutrition started tube feeds today, may need G-tube placement on Monday - surgery to evaluate.       Habitual alcohol use  Alcoholic cirrhosis with ascites  12/3/20 -- Per chart review she has frequent alcohol use, although she says not daily, last drink 3-4 days ago. Denies prior history of withdrawal or seizures. Does appear to have prior paracentesis due to cirrhosis and ascites. LFTs normal on admission.   - No evidence of withdrawal on admission, so no CIWA indicated, but monitor for evidence and initiate CIWA as appropriate  12/5/2020 -- doing well so far.  Will request physical therapy/OT evaluation for balance issues.       Personal history of tobacco use, presenting hazards to health  Quit smoking 2 months ago. Has not been using nicotine replacement.      Asymptomatic COVID screen negative.       Diet:   NPO     DVT Prophylaxis: lovenox     Palm Catheter: not present     Code Status: Full Code  - discussed with patient on admission                  Disposition  Hope for discharge Monday or Tuesday             Interval History:   No new concerns, not much pain, dilaudid x 1 but no other concerns.  No fever or chills. No dyspnea.  Feels good today.             Review of Systems:    ROS: 10 point ROS neg other than the symptoms noted above in the HPI.           Medications:   Current active medications and PTA medications reviewed, see medication list for details.            Physical Exam:   Vitals were reviewed  Patient Vitals for the past 24 hrs:   BP Temp Temp src Pulse Resp SpO2 Weight   12/05/20 1535 -- 98.8  F (37.1  C) Oral 90 11 96 % --   12/05/20 1530 (!) 142/73 -- -- 54 18 95 % --   12/05/20 1500 (!) 160/73 -- -- 73 22 91 % --   12/05/20 1400 (!) 156/78 -- -- 65 8 95 % --   12/05/20 1300 (!) 155/72 -- -- 84 13 94 % --   12/05/20 1200 133/80 -- -- 57 8 93 % --   12/05/20 1136 -- -- -- -- -- 97 % --   12/05/20 1120 -- 98.1  F (36.7  C) Oral 52 11 97 % --   12/05/20 1100  139/88 -- -- 61 12 94 % --   20 1000 (!) 151/71 -- -- 54 12 95 % --   20 0950 -- -- -- -- -- 92 % --   20 0945 -- -- -- -- -- (!) 89 % --   20 0940 -- -- -- -- -- (!) 88 % --   20 0930 -- -- -- -- -- 92 % --   20 0900 135/67 -- -- 64 10 92 % --   20 0800 113/76 -- -- 58 13 98 % --   20 0731 -- -- -- 56 12 94 % --   20 0700 130/65 98.5  F (36.9  C) Oral 60 10 96 % --   20 0600 118/65 -- -- 72 19 93 % --   20 0500 119/64 -- -- 65 8 98 % 70.1 kg (154 lb 8.7 oz)   20 0400 97/61 97.9  F (36.6  C) Oral (!) 46 8 95 % --   20 0200 127/60 -- -- 73 9 94 % --   20 0100 114/51 -- -- 56 11 94 % --   20 0000 117/64 -- -- 64 10 93 % --   20 2300 93/59 98.2  F (36.8  C) Oral 56 9 94 % --   20 2200 118/68 -- -- 63 12 91 % --   20 2100 133/72 -- -- 66 15 93 % --   20 2000 127/64 98.4  F (36.9  C) Oral 65 12 92 % --   20 1900 127/62 -- -- 70 13 95 % --   20 1800 129/66 -- -- 66 11 94 % --   20 1730 (!) 141/65 -- -- 68 16 94 % --   20 1700 -- -- -- -- -- 94 % --       Temperatures:  Current - Temp: 98.8  F (37.1  C); Max - Temp  Av.3  F (36.8  C)  Min: 97.9  F (36.6  C)  Max: 98.8  F (37.1  C)  Respiration range: Resp  Av  Min: 8  Max: 22  Pulse range: Pulse  Av.8  Min: 46  Max: 90  Blood pressure range: Systolic (24hrs), Av , Min:93 , Max:160   ; Diastolic (24hrs), Av, Min:51, Max:88    Pulse oximetry range: SpO2  Av.8 %  Min: 88 %  Max: 98 %  I/O last 3 completed shifts:  In:  [I.V.:1900; NG/GT:30]  Out: -     Intake/Output Summary (Last 24 hours) at 2020 1608  Last data filed at 2020 1600  Gross per 24 hour   Intake 2400 ml   Output --   Net 2400 ml     EXAM:  General: awake and alert, NAD, oriented x 3  Head: normocephalic  Neck: unremarkable, no lymphadenopathy   HEENT: oropharynx pink and moist    Heart: Regular rate and rhythm, no murmurs, rubs, or  gallops  Lungs: clear to auscultation bilaterally with good air movement throughout  Abdomen: soft, non-tender, no masses or organomegaly  Extremities: no edema in lower extremities   Skin unremarkable.               Data:     Results for orders placed or performed during the hospital encounter of 12/03/20 (from the past 24 hour(s))   Glucose by meter   Result Value Ref Range    Glucose 145 (H) 70 - 99 mg/dL   Nutrition Services Adult IP Consult    Narrative    Ana Maria Daniel RD     12/5/2020 10:35 AM  CLINICAL NUTRITION SERVICES - ASSESSMENT NOTE     Nutrition Prescription    RECOMMENDATIONS FOR MDs/PROVIDERS TO ORDER:  -MD to change to non-dextrose IVF once TF starts  -MD to adjust IVF as TF advances towards goal    Malnutrition Status:    Patient does not meet two of the established criteria necessary   for diagnosing malnutrition but is at risk for malnutrition    Recommendations already ordered by Registered Dietitian (RD):  Once placement is verified and tube can be used safely, recommend   the following TF regimen:    1) Isosource 1.5 via NG tube at 20 mL/hr x 24 hrs.  This rate   will meet 44% calorie needs and 48% protein needs. Recheck   refeeding labs and overall TF tolerance 12/6. RD to update   orders.    2) Water flushes of 30 mL q 4 hrs for tube patency.     ADDENDUM 1027 12/5: RD to place orders to start feeds.    Future/Additional Recommendations:  --RD to adjust water flushes as TF advances towards goal rate,   pending IVF.  -suspect patient will need to transition to bolus feeds once PEG   placed, pending discharge plan. RD to monitor.     ADDENDUM 1027 12/5: Discussed placement w/ Dr. Boggs. Tube is at   least in the stomach, and interpretation is that the tip of the   tube is in the duodenum and safe to use. IVF also changed to   non-dextrose LR @ 100 mL/hr.    REASON FOR ASSESSMENT  Karen Tee is a/an 64 year old female assessed by the   dietitian for Provider Order - Registered  "Dietitian to Assess and   Order TF per Medical Nutrition Therapy Protocol.    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 Biopsy pending. PMH is significant for alcoholic   cirrhosis with ascites (patient denies daily use, but has had   previous paracentesis due to cirrhosis and ascites), and quit   smoking tobacco 2 months ago. Patient had NG tube placement 12/4   after failing all consistencies on swallow study. High risk for   aspiration. Per radiology results, tip of the feeding tube is   projected over the proximal duodenum. Nonobstructive bowel gas   pattern. Waiting to hear back from MD if tube is ok to use. Plan   for PEG tube early next week. Patient lives alone but family   lives locally.    NUTRITION HISTORY  Obtained from patient (communicates via whiteboard)  -normal intake is 2-3 meals/day with good appetite, but intake   does vary. Takes a daily MVITM.  -ear pain started about 2 months ago, and this is when her   appetite started to decline. Since then, she's been eating about   25% of her 2-3 meals/day.    CURRENT NUTRITION ORDERS  Diet: None - plan to start TF  Intake/Tolerance: none since admission (LOS day 2). Normoactive   BS in all quadrants, abdomen distended.    LABS  Na 139 (WNL)  K+ 3.6 (WNL)  Phos pending  Mag pending  Creatinine 0.47 (L)  GFR > 90  BUN 11 (WNL)    12/3:  Alk Phos 140 (WNL)  ALT 15 (WNL)  AST 28 (WNL)    MEDICATIONS  IVF: D5 in LR @ 125 mL/hr    ANTHROPOMETRICS  Height: 160 cm (5' 2.992\")  Most Recent Weight: 70.1 kg (154 lb 8.7 oz)    IBW: 50 kg (140% IBW)  BMI: Overweight BMI 25-29.9  Weight History:   Wt Readings from Last 10 Encounters:   12/05/20 70.1 kg (154 lb 8.7 oz)   11/04/20 65.8 kg (145 lb)   03/10/20 64.9 kg (143 lb)   02/18/20 64.4 kg (142 lb)   07/06/18 64.1 kg (141 lb 6.4 oz)   06/18/18 65.6 kg " (144 lb 10 oz)   06/11/18 73.5 kg (162 lb)   03/09/12 51.7 kg (114 lb)   12/13/10 51.3 kg (113 lb)   08/23/10 53.5 kg (118 lb)   -weight appears to be steady over the last 10 months.  -patient denies any unintentional weight loss. Does not know her   UBW, but when RD informed her that weight has been ~ 140-145 lbs   she said this sounds about right.    Vitals:    12/03/20 1703 12/04/20 0700 12/05/20 0500   Weight: 65.8 kg (145 lb) 68 kg (149 lb 14.6 oz) 70.1 kg (154 lb   8.7 oz)   -daily weights are ordered  -suspect weight is trending up due to IVF infusing since   admission or fluid retention with cirrhosis. Will use admit   weight of 66 kg as dosing weight.    Dosing Weight: 66 kg (appears to be UBW)    ASSESSED NUTRITION NEEDS  Estimated Energy Needs: 7925-9537 kcals/day (25 - 30 kcals/kg)  Justification: Maintenance  Estimated Protein Needs: 66-79 grams protein/day (1 - 1.2 grams   of pro/kg)  Justification: Maintenance  Estimated Fluid Needs: (1 mL/kcal)   Justification: Per provider pending fluid status    PHYSICAL FINDINGS  No abnormal nutrition-related physical findings observed.     MALNUTRITION  % Intake: </=75% for >/= 1 month (severe)  % Weight Loss: None noted  Subcutaneous Fat Loss: None observed  Muscle Loss: None observed  Fluid Accumulation/Edema: None noted  Malnutrition Diagnosis: Patient does not meet two of the   established criteria necessary for diagnosing malnutrition but is   at risk for malnutrition.    NUTRITION DIAGNOSIS  Inadequate oral intake related to poor appetite secondary to ear   pain as evidenced by patient report of eating 25% of 2-3   meals/day over the last 2 months.      INTERVENTIONS  Implementation  Nutrition Education: Provided education on role of RD in tube   feeding care.   Collaboration with other providers  Enteral Nutrition - Initiate  Feeding tube flush    Once placement is verified and tube can be used safely, recommend   the following TF regimen:    1) Isosource  1.5 via NG tube at 20 mL/hr x 24 hrs.  This rate   will meet 44% calorie needs and 48% protein needs. Recheck   refeeding labs and overall TF tolerance. If tolerating and labs   are WNL, advance TF rate by 10 mL/hr q 8 hrs to goal rate of 50   mL/hr x 24 hrs. Do not advance TF if lytes < WNL. Isosource 1.5 @   goal 50 ml/hr (1200 ml/day) to provide 1800 kcals (27   kcal/kg/day), 82 g PRO (1.2 g/kg/day), 211 g CHO, 71 g Fat, 18 g   Fiber and 912 mL free water daily. This will meet 100% of calorie   and protein needs.    2) Water flushes of 30 mL q 4 hrs for tube patency.       -MD to change to non-dextrose IVF once TF starts  -MD to adjust IVF as TF advances towards goal rate.  -RD to adjust water flushes as TF advances towards goal rate,   pending IVF.  -suspect patient will need to transition to bolus feeds once PEG   placed, pending discharge plan. RD to monitor.      Goals  Total avg nutritional intake to meet a minimum of 25 kcal/kg and   66 g PRO/kg daily (per dosing wt 66 kg).     Monitoring/Evaluation  Progress toward goals will be monitored and evaluated per   protocol.    Ana Maria Daniel RDN, LD  Clinical Dietitian  Office: [phone]  Weekend Pager: [phone]     Abdomen 1 View Port    Narrative    ABDOMEN ONE VIEW PORTABLE  12/4/2020 5:51 PM     HISTORY: Feeding tube placement    COMPARISON: None.      Impression    IMPRESSION: Tip of the feeding tube is projected over the proximal  duodenum. Nonobstructive bowel gas pattern.    GILBERT FRANCO MD   Glucose by meter   Result Value Ref Range    Glucose 104 (H) 70 - 99 mg/dL   Glucose by meter   Result Value Ref Range    Glucose 125 (H) 70 - 99 mg/dL   CBC with platelets   Result Value Ref Range    WBC 11.2 (H) 4.0 - 11.0 10e9/L    RBC Count 3.60 (L) 3.8 - 5.2 10e12/L    Hemoglobin 11.8 11.7 - 15.7 g/dL    Hematocrit 34.7 (L) 35.0 - 47.0 %    MCV 96 78 - 100 fl    MCH 32.8 26.5 - 33.0 pg    MCHC 34.0 31.5 - 36.5 g/dL    RDW 15.3 (H) 10.0 - 15.0 %     Platelet Count 226 150 - 450 10e9/L   Basic metabolic panel   Result Value Ref Range    Sodium 139 133 - 144 mmol/L    Potassium 3.6 3.4 - 5.3 mmol/L    Chloride 103 94 - 109 mmol/L    Carbon Dioxide 30 20 - 32 mmol/L    Anion Gap 6 3 - 14 mmol/L    Glucose 115 (H) 70 - 99 mg/dL    Urea Nitrogen 11 7 - 30 mg/dL    Creatinine 0.47 (L) 0.52 - 1.04 mg/dL    GFR Estimate >90 >60 mL/min/[1.73_m2]    GFR Estimate If Black >90 >60 mL/min/[1.73_m2]    Calcium 8.8 8.5 - 10.1 mg/dL   Magnesium   Result Value Ref Range    Magnesium 1.7 1.6 - 2.3 mg/dL   Phosphorus   Result Value Ref Range    Phosphorus 3.7 2.5 - 4.5 mg/dL   Glucose by meter   Result Value Ref Range    Glucose 118 (H) 70 - 99 mg/dL   Glucose by meter   Result Value Ref Range    Glucose 103 (H) 70 - 99 mg/dL           Attestation:  I have reviewed today's vital signs, notes, medications, labs and imaging.  Amount of time spent in direct patient care: 30 minutes.     Hoang Boggs MD, MD

## 2020-12-06 NOTE — PROGRESS NOTES
12/06/20 1356   Quick Adds   Type of Visit Initial Occupational Therapy Evaluation   Living Environment   People in home alone   Current Living Arrangements apartment   Home Accessibility no concerns   Transportation Anticipated family or friend will provide   Living Environment Comments Pt states she lives on the 3rd floor and will take the stairs unless she is carrying groceries. Then she will take the elevator.   Disability/Function   Hearing Difficulty or Deaf no   Wear Glasses or Blind no   Concentrating, Remembering or Making Decisions Difficulty no   Difficulty Communicating no   Difficulty Eating/Swallowing other (see comments)  (new trach placement)   Walking or Climbing Stairs Difficulty no   Dressing/Bathing Difficulty no   Toileting issues no   Doing Errands Independently Difficulty (such as shopping) no   Fall history within last six months no   General Information   Onset of Illness/Injury or Date of Surgery 12/03/20   Patient/Family Therapy Goal Statement (OT) pt would like to return home   Additional Occupational Profile Info/Pertinent History of Current Problem Karen Tee is a 64 year old female admitted on 12/3/2020. She presented to ENT clinic for evaluation of ear pain and was found to have respiratory distress and an unstable airway due to a laryngeal mass for which she underwent an emergent tracheostomy and is now admitted for further evaluation and cares.per Dr note   Existing Precautions/Restrictions swallowing   Limitations/Impairments swallowing   General Observations and Info Pt uses white board to communicate needs and answers   Cognitive Status Examination   Orientation Status orientation to person, place and time   Affect/Mental Status (Cognitive) WNL   Cognitive Status Comments no major concerns noted   Posture   Posture not impaired   Range of Motion Comprehensive   General Range of Motion no range of motion deficits identified   Strength Comprehensive (MMT)   General Manual  Muscle Testing (MMT) Assessment no strength deficits identified   Coordination   Upper Extremity Coordination No deficits were identified   Bed Mobility   Bed Mobility supine-sit;sit-supine   Supine-Sit [place] (Bed Mobility) modified independence   Sit-Supine [place] (Bed Mobility) modified independence   Comment (Bed Mobility) Pt requires assist with IV, feeding tube, O2 ect cording   Activities of Daily Living   BADL Assessment/Intervention bathing;upper body dressing;lower body dressing;grooming;toileting   Bathing Assessment/Intervention   [place] Level (Bathing) minimum assist (75% patient effort);set up   Comment (Bathing) Pt has grab bars in tub/shower combo   Upper Body Dressing Assessment/Training   [place] Level (Upper Body Dressing) independent   Lower Body Dressing Assessment/Training   [place] Level (Lower Body Dressing) independent   Grooming Assessment/Training   [place] Level (Grooming) independent   Toileting   [place] Level (Toileting) modified independence   Comment (Toileting) Pt states has grab bars near toilet   Instrumental Activities of Daily Living (IADL)   Previous Responsibilities medication management;housekeeping;meal prep;laundry;shopping;yardwork;finances;driving   Clinical Impression   Criteria for Skilled Therapeutic Interventions Met (OT) no   OT Diagnosis weakness   Assessment of Occupational Performance 1-3 Performance Deficits   Clinical Decision Making Complexity (OT) low complexity   Therapy Frequency (OT) 1x eval   Risks and Benefits of Treatment have been explained. Yes   Patient, Family & other staff in agreement with plan of care Yes   Comment-Clinical Impression No skilled OT need identified at this time with exception of bathing with new trach   OT Discharge Planning    OT Discharge Recommendation (DC Rec) Home with assist   OT Rationale for DC Rec no immedicate OT needs noted during eval with exception of bathing with new trach   OT  Brief overview of current status  as stated above   Total Evaluation Time (Minutes)   Total Evaluation Time (Minutes) 15

## 2020-12-06 NOTE — PROGRESS NOTES
[facility] Hospitalist Progress Note           Assessment & Plan        Karen Tee is a 64 year old female admitted on 12/3/2020. She presented to ENT clinic for evaluation of ear pain and was found to have respiratory distress and an unstable airway due to a laryngeal mass for which she underwent an emergent tracheostomy and is now admitted for further evaluation and cares.     Unstable airway, S/P emergency tracheotomy for assistance in breathing  Tracheostomy in place  Laryngeal mass  Neck mass  12/3/20 -- Underwent urgent tracheotomy with tracheostomy placement on 12/3 due to new finding of laryngeal mass causing unstable airway. Biopsies were taken during procedure.  - ENT placed all tracheostomy orders; for trach-specific questions contact ENT  - ENT consult for tracheostomy management  - NPO  - Speech consult, patient will need to pass swallow study before discharge; if fails would need a feeding tube    - Care Transitions consult to assist with obtaining necessary home equipment to manage a tracheostomy  12/4/2020 -- doing well so far - see ENT note for details/plan - likely replacing trach on Monday with possible discharge that pm or Tues.    12/5/2020 -- ordering supplies as below per ENT recommendations.  Feeding tube placed yesterday as below.     12/6/2020 -- replacing electrolytes as below, stopping tube feeds tonight to be NPO for possible surgery tomorrow.  ENT to see patient tomorrow, likely replacing trach after feeding tube placement.     - Will need outpatient follow-up for cancer work-up, - Pathology pending    Per ENT the patient needs the following supplies for home:  - An extra #6 cuffless Shiley tracheostomy tube with disposable inner cannula  - Multiple disposable inner cannulas for a #6 Shiley tracheostomy tube (enough for roughly 1 a day, 30-month, etc.)  - 10 or 12 Fr suction catheters for trach cares (enough for 3-4 per day)  - Multiple saline jets (3-5 mL) for tracheal lavage  (3-4 per day)  - Portable suction machine  - Soft reusable Velcro trach ties    Hypomagnesemia  12/6/2020 -- replacing, follow.       Aspiration on swallow evaluation  12/4/2020 -- failed all consistencies on video swallow evaluation -   12/5/2020 -- ENT placed feeding tube yesterday, nutrition started tube feeds today,  12/6/2020 -- planning for G-tube vs J-tube placement on Monday or Tuesday- surgery will discuss options tomorrow.       Habitual alcohol use  Alcoholic cirrhosis with ascites  12/3/20 -- Per chart review she has frequent alcohol use, although she says not daily, last drink 3-4 days ago. Denies prior history of withdrawal or seizures. Does appear to have prior paracentesis due to cirrhosis and ascites. LFTs normal on admission.   - No evidence of withdrawal on admission, so no CIWA indicated, but monitor for evidence and initiate CIWA as appropriate  12/6/2020 -- doing well so far.       Personal history of tobacco use, presenting hazards to health  Quit smoking 2 months ago. Has not been using nicotine replacement.      Constipation   12/6/2020 -- prn medications ordered per protocol       Asymptomatic COVID screen negative.       Diet:   NPO     DVT Prophylaxis: lovenox     Palm Catheter: not present     Code Status: Full Code  - discussed with patient on admission          Disposition  Patient does not qualify for long-term acute care, but I think would benefit from TCU on discharge if agreeable.  Likely discharge Tuesday afternoon or Wed morning.              Interval History:   Doing well, but no bowel movement and feeling a bit bloated.  Would like to try some liquid pain medications instead of IV, but overall happy with pain control.  No fever or chills.  No dyspnea.  No new concerns.   No other pain             Review of Systems:    ROS: 10 point ROS neg other than the symptoms noted above in the HPI.           Medications:   Current active medications and PTA medications reviewed, see  medication list for details.            Physical Exam:   Vitals were reviewed  Patient Vitals for the past 24 hrs:   BP Temp Temp src Pulse Resp SpO2 Weight   20 1500 -- -- -- -- -- 97 % --   20 1400 (!) 142/74 -- -- 82 13 98 % --   20 1300 -- -- -- -- -- 98 % --   20 1215 (!) 147/79 -- -- 67 11 98 % --   20 1000 -- -- -- -- -- 98 % --   20 0900 -- -- -- -- -- 98 % --   20 0800 -- -- -- -- -- 94 % --   20 0748 138/72 98.5  F (36.9  C) Oral 64 14 97 % --   20 0539 -- -- -- -- -- -- 64.2 kg (141 lb 8.6 oz)   20 0400 (!) 151/78 -- -- 67 11 97 % --   20 0300 (!) 156/75 -- -- 63 -- 96 % --   20 0200 (!) 150/83 -- -- 54 -- 96 % --   20 0100 (!) 159/81 -- -- 64 -- 95 % --   20 0000 (!) 153/71 97.9  F (36.6  C) Oral 55 -- 95 % --   20 2330 (!) 171/75 98  F (36.7  C) Oral -- 18 97 % --   20 2200 -- -- -- 52 -- 94 % --   20 2100 (!) 163/81 -- -- 87 11 -- --   20 -- 98  F (36.7  C) Oral -- -- -- --   20 (!) 152/73 -- -- 66 14 96 % --   20 1900 (!) 148/68 -- -- 50 9 95 % --   20 1845 -- 98.6  F (37  C) Oral 57 8 95 % --   20 1800 (!) 140/65 -- -- 67 12 95 % --   20 1730 -- -- -- -- -- 95 % --   20 1700 129/62 -- -- 65 8 94 % --   20 1630 -- -- -- -- -- 95 % --       Temperatures:  Current - Temp: 98.5  F (36.9  C); Max - Temp  Av.2  F (36.8  C)  Min: 97.9  F (36.6  C)  Max: 98.6  F (37  C)  Respiration range: Resp  Av.7  Min: 8  Max: 18  Pulse range: Pulse  Av  Min: 50  Max: 87  Blood pressure range: Systolic (24hrs), Av , Min:129 , Max:171   ; Diastolic (24hrs), Av, Min:62, Max:83    Pulse oximetry range: SpO2  Av %  Min: 94 %  Max: 98 %  I/O last 3 completed shifts:  In: 2830 [I.V.:2400; NG/GT:110]  Out: -     Intake/Output Summary (Last 24 hours) at 2020 1601  Last data filed at 2020 1500  Gross per 24 hour   Intake 2545 ml    Output --   Net 2545 ml     EXAM:  General: awake and alert, NAD, oriented x 3  Head: normocephalic  Neck: unremarkable, no lymphadenopathy   HEENT: oropharynx pink and moist    Heart: Regular rate and rhythm, no murmurs, rubs, or gallops  Lungs: clear to auscultation bilaterally with good air movement throughout  Abdomen: soft, non-tender, no masses or organomegaly  Extremities: no edema in lower extremities   Skin unremarkable.               Data:     Results for orders placed or performed during the hospital encounter of 12/03/20 (from the past 24 hour(s))   Glucose by meter   Result Value Ref Range    Glucose 90 70 - 99 mg/dL   Creatinine   Result Value Ref Range    Creatinine 0.47 (L) 0.52 - 1.04 mg/dL    GFR Estimate >90 >60 mL/min/[1.73_m2]    GFR Estimate If Black >90 >60 mL/min/[1.73_m2]   Magnesium   Result Value Ref Range    Magnesium 1.4 (L) 1.6 - 2.3 mg/dL   Phosphorus   Result Value Ref Range    Phosphorus 3.7 2.5 - 4.5 mg/dL   Potassium   Result Value Ref Range    Potassium 3.4 3.4 - 5.3 mmol/L   Glucose by meter   Result Value Ref Range    Glucose 101 (H) 70 - 99 mg/dL           Attestation:  I have reviewed today's vital signs, notes, medications, labs and imaging.  Amount of time spent in direct patient care: 30 minutes.     Hoang Boggs MD, MD

## 2020-12-06 NOTE — PROGRESS NOTES
CLINICAL NUTRITION SERVICES - BRIEF NOTE       Nutrition Prescription    RECOMMENDATIONS FOR MDs/PROVIDERS TO ORDER:  -MD to adjust IVF as TF advances towards goal  -Magnesium replacement protocol  -Monitor K+ lab tomorrow morning and order replacement protocol if needed.    Recommendations already ordered by Registered Dietitian (RD):  -As long as Mag is being replaced, advance TF rate to 30 mL/hr x 8 hrs. Recheck Mag per protocol, and if WNL advance by 10 mL/hr q 8 hrs to goal of 50 mL/hr x 24 hrs.    Future/Additional Recommendations:  -continue water flushes of 30 mL q 4 hrs while current IVF rate is running. RD to adjust accordingly.         Nutrition Support:  -started Isosource via ND tube yesterday 12/5 @ 20 mL/hr continuous.  This rate meets 44% calorie needs and 48% protein needs.  -water flushes: 30 mL q 4 d/t IVF.  -no tolerance issues noted  -surgery consulted for PEG placement.    Labs:  -Mag 1.4 (L)   -K+ 3.4 (WNL, but if goes any lower will be < WNL)  -Phos 3.7 (WNL)  -Creatinine 0.47 (L)  -GFR > 90    Meds:  -IVF (LR) @ 100 mL/hr       Recommendations:  -paged MD to order Magnesium replacement protocol, and MD to adjust IVF as TF advances towards goal rate.  -As long as Mag is being replaced, advance TF rate to 30 mL/hr x 8 hrs. Recheck Mag per protocol, and if WNL advance by 10 mL/hr q 8 hrs to goal of 50 mL/hr x 24 hrs.  -continue water flushes of 30 mL q 4 hrs while current IVF rate is running. RD to adjust accordingly.    Ana Maria Daniel RDN, LD  Clinical Dietitian  Office: [phone]  Weekend Pager: [phone]

## 2020-12-06 NOTE — PROGRESS NOTES
Physical Therapy Evaluation       12/06/20 0900   Quick Adds   Type of Visit Initial PT Evaluation   Living Environment   People in home alone   Current Living Arrangements apartment   Home Accessibility no concerns   Transportation Anticipated family or friend will provide   Living Environment Comments Pt states she lives on the 3rd floor and will take the stairs unless she is carrying groceries. Then she will take the elevator.   Self-Care   Equipment Currently Used at Home none   Disability/Function   Hearing Difficulty or Deaf no   Wear Glasses or Blind no   Concentrating, Remembering or Making Decisions Difficulty no   Difficulty Communicating no   Difficulty Eating/Swallowing other (see comments)  (new trach)   Walking or Climbing Stairs Difficulty no   Dressing/Bathing Difficulty no   Toileting issues no   Doing Errands Independently Difficulty (such as shopping) no   Fall history within last six months no   General Information   Onset of Illness/Injury or Date of Surgery 12/03/20   Referring Physician Dr Boggs   Patient/Family Therapy Goals Statement (PT) wants to go home   Pertinent History of Current Problem (include personal factors and/or comorbidities that impact the POC) Per H&P:  Karen Tee is a 64 year old female admitted on 12/3/2020. She presented to ENT clinic for evaluation of ear pain and was found to have respiratory distress and an unstable airway due to a laryngeal mass for which she underwent an emergent tracheostomy and is now admitted for further evaluation and cares.   Cognition   Orientation Status (Cognition) oriented x 4   Affect/Mental Status (Cognition) WNL   Follows Commands (Cognition) WNL   Posture    Posture Forward head position;Protracted shoulders   Range of Motion (ROM)   ROM Quick Adds ROM WFL   Strength   Strength Comments B LE generally 4+/5   Bed Mobility   Comment (Bed Mobility) sit <> supine independent   Transfers   Transfer Safety Comments sit <> stand  independent   Gait/Stairs (Locomotion)   Comment (Gait/Stairs) Pt amb from bed <>BR independently without AD.   Balance   Identified Impairments Contributing to Balance Disturbance impaired postural control;decreased strength   Balance Comments Pt has deficits with looking behind herself in standing, turning 360* safely in 4 seconds or less; unable to alternate foot on step while standing unsupported, tandem stand and SLS.    Clinical Impression   Criteria for Skilled Therapeutic Intervention evaluation only   PT Diagnosis (PT) impaired balance   Clinical Presentation Stable/Uncomplicated   Clinical Presentation Rationale clinical judgement   Clinical Decision Making (Complexity) low complexity   Therapy Frequency (PT) Evaluation only   Risk & Benefits of therapy have been explained evaluation/treatment results reviewed;participants included;patient   PT Discharge Planning    PT Discharge Recommendation (DC Rec) Acute Rehab Center-Motivated patient will benefit from intensive, interdisciplinary therapy.  Anticipate will be able to tolerate 3 hours of therapy per day   PT Rationale for DC Rec Pt would benefit from ARC for balance training to reduce risk of falls.   PT Brief overview of current status  Pt has deficits with looking behind herself in standing, turning 360* safely in 4 seconds or less; unable to alternate foot on step while standing unsupported, tandem stand and SLS.    Total Evaluation Time   Total Evaluation Time (Minutes) 15     Smita Desai PT

## 2020-12-06 NOTE — PROGRESS NOTES
Patient has small amounts of serosanguineous drainage from trach site, and  Sutures are intact. Trach site cares performed at least every 4 hrs and prn. Skin prep and mepilex placed to trach site. Patient continues on trach dome at 28%, and O2 sats remains >92%. Patient received prn dilaudid x2 for HA, with relief. Patient ambulated in her room and to the bathroom 3x this shift and she has a steady gait, only needs assistance with disconnecting her lines.

## 2020-12-06 NOTE — PROGRESS NOTES
Care Management Follow Up    Length of Stay (days): 3    Expected Discharge Date: 12/07/20     Concerns to be Addressed: discharge planning   New trach cares, new PEG tube for nutrition to be placed on 12-7-20.    Patient plan of care discussed at interdisciplinary rounds: Yes, per IDT team, pt with extensive care needs upon discharge.  New trach, new PEG tube.  Discussed that pt will need extensive education in order to discharge to home safely.    Anticipated Discharge Disposition: Home Care;Transitional Care     Anticipated Discharge Services:  Trach cares, Peg tube cares    Anticipated Discharge DME: Oxygen, RT supplies for trach, home infusion for feedings.    Patient/family educated on Medicare website which has current facility and service quality ratings: yes  Education Provided on the Discharge Plan:  Yes  Patient/Family in Agreement with the Plan: yes    Referrals Placed by CM/MAXWELL: Internal Clinic Care Coordination;Post Acute Facilities;Homecare  Private pay costs discussed: NA today    Additional Information:  Per IDT rounds, pt to remain hospitalized another 1-2 days as she needs to have a peg tube placed for nutrition & trach replaced.    IDT team indicates that our staff may not know how the intricate details & education needs for pt to discharge to home.  PT assessed pt and recommends ARU cares.    MAXWELL placed call and spoke with NAOMI Nayak, [phone], who states that pt does not have intensive PT needs for ARU- declined for cares.    Nael recommends TCU cares based on review of pt's EMR.     Yesterday, MAXWELL discussed at length with pt discharge plans & how I would like to have several plans in place & decide on a final plan closer to discharge.  MAXWELL unable to speak with pt today re: her discharge plan of care due to time constraints, however, plan is the same, minus ARU placement.    1.  Home with  Home Care .  Lengthy discussion on HC availability & that preference is for pt to have someone stay  with her to also be taught her cares.  Pt unable to have anyone stay with her, but will think about whose home she can discharge to.  Discussed the importance of the family/friend being involved in daily care needs & have the ability to assist her with cares.    2.  TCU cares; pt is not pleased with this idea & prefers option 1 before referrals are placed.    MAXWELL called, left a message requesting a return call to:  [facility]  Phone: [phone]  Fax:  [phone]    MAXWELL faxed scripts in IATF, clinical info including the OR note & progress notes to [facility] for review.  MAXWELL is aware that [facility] staff will send own forms to CTS staff for MD signature.      AMXWELL also called referral to [facility], [phone], spoke with Jayna on-call RN.  MAXWELL faxed referral for home infusion benefits for new tube feedings. Benefits to be check in the AM.    CTS to continue to follow.    LUTHER Ramesh LSW

## 2020-12-07 NOTE — PROGRESS NOTES
Care Management Follow Up    Length of Stay (days): 4    Expected Discharge Date: 12/08/20     Concerns to be Addressed: discharge planning  Pt with new trach & possibly may need feeding tube placed prior to discharge  Patient plan of care discussed at interdisciplinary rounds: Yes    Anticipated Discharge Disposition: Skilled Nursing Facilty;LTACH     Anticipated Discharge Services: None  Anticipated Discharge DME: Oxygen    Referrals Placed by CM/SW: Post Acute Facilities  Private pay costs discussed: Not applicable    Additional Information:  Long discussion regarding patient's discharge needs with IDT.  Spoke with MD, nursing and [facility] team regarding patient needs.      All agree patient does not have adequate support for home discharge.  Concerns about patient's ability to manage her trach needs are raised by MD and nursing team.  [facility] (consulted for enteral nutrition) declined acceptance related to lack of home support.      Interdisciplinary team agrees patient will need skilled nursing care at discharge with capable trach and enteral nutrition support.      Patient did not meet criteria for [facility].  Referral placed to [facility] LTACH admission liaison ([phone]).  She is reviewing for admission.  CTS unable to discuss directly with patient today (will discuss directly with patient 12/8 am).  MD reports patient is agreeable to LTACH, and realizes she needs more support at discharge.  CTS requested all staff that is in contact with patient to help communicate need for skilled nursing care following hospitalization.        TCU referrals are pending as below (facilities all accept new trach patients):    [facility] (Main Phone: [phone] Admissions phone: [phone] Fax: [phone])--pending    [facility] (Admissions: [phone] Main Phone: [phone] Fax: [phone])--pending    [facility] (Admissions: [phone] Main: [phone] Fax: [phone])--pending    [facility]--pending        PLAN:  LTACH (ref pend) vs TCU (ref pend)--CTS to discuss directly with patient 12/8 am.      Tracee SNEEDN RN  Inpatient Care Coordinator  [facility] [phone]  [facility] [phone]

## 2020-12-07 NOTE — CONSULTS
"Surgical Consultation/History and Physical  [facility] General Surgery    Karen is seen in consultation for feeding tube placement, at the request of the hospitalist service.    Chief Complaint:  Difficulty Breathing    History of Present Illness: Karen Tee is a 64 year old female presents with difficulty breathing.  Patient was found to have a large near obstructing head/neck cancer requiring fiberoptic intubation and subsequent open tracheostomy, pathology is pending.  Patient has known cirrhosis and previous significant ascites with large volume paracentesis.  She feels her abdomen is \"big again\".  She recently began consuming alcohol again.  She does not follow regularly with hepatology.  She has a Dobhoff feeding tube in place at present.    Patient Active Problem List   Diagnosis     Chronic depressive personality disorder     Major depressive disorder, single episode, mild (H)     Elevated blood pressure reading without diagnosis of hypertension     Tobacco use disorder     Hyperlipidemia     Inflammatory disease of breast     Ascites     Portal hypertension (H)     HYPERLIPIDEMIA LDL GOAL <100     Alcohol dependence (H)     Cirrhosis of liver with ascites, unspecified hepatic cirrhosis type (H)     Hyponatremia     Abnormal LFTs     Elevated INR     Leukocytosis     Laryngeal mass     Neck mass     Shortness of breath     Personal history of tobacco use, presenting hazards to health     S/P emergency tracheotomy for assistance in breathing (H)     Habitual alcohol use     Tracheostomy in place (H)     Alcoholic cirrhosis of liver with ascites (H)       Past Medical History:   Diagnosis Date     Alcohol dependence (H)      Chronic depressive personality disorder      Cirrhoses, liver     diagnosed 7/10     Tobacco dependence        Past Surgical History:   Procedure Laterality Date     HC EXCISION BREAST LESION, OPEN >=1       TRACHEOSTOMY N/A 12/3/2020    Procedure: AWAKE FIBEROPTIC INTUBATION, OPEN " "TRACHEOSTOMY, DIRECT LARYNGOSCOPY WITH BIOPSY;  Surgeon: Marshall Canales MD;  Location: [facility] OR       Family History   Problem Relation Age of Onset     Hypertension Mother      Depression Sister         and Anxiety.     Psychotic Disorder Sister         severe anxiety     Neurologic Disorder Brother          from brain tumor.       Social History     Tobacco Use     Smoking status: Former Smoker     Packs/day: 0.50     Types: Cigarettes     Quit date: 10/17/2020     Years since quittin.1     Smokeless tobacco: Never Used     Tobacco comment: has not smoked since 10/17/20   Substance Use Topics     Alcohol use: No     Comment: pt. has not had any alcohol since hospitalization 2018        History   Drug Use No     Comment: hx of alcohol abuse.       No current outpatient medications on file.       Allergies   Allergen Reactions     Nka [No Known Allergies]        Review of Systems:   Please see aforementioned positives in HPI    Physical Exam:  BP (!) 152/74 (BP Location: Right arm)   Pulse 73   Temp 98.6  F (37  C) (Oral)   Resp 16   Ht 1.6 m (5' 2.99\")   Wt 69.7 kg (153 lb 10.6 oz)   LMP 2007   SpO2 98%   BMI 27.23 kg/m      Constitutional- No acute distress, chronically ill appearing, older than stated age  Eyes: Anicteric, no injection.  PERRL  ENT:  Normocephalic, atraumatic, Nose midline, tracheostomy in place, self suctioning  Respiratory- Clear to auscultation bilaterally, good inspiratory effort  Cardiovascular - Heart RRR, no lift's, thrills, murmurs, rubs, or gallop.  No peripheral edema.  No clubbing.  Abdomen - Protuberant and distended, numerous prominent abdominal veins  Neuro - No focal neuro deficits, Alert and oriented x 3  Psych: Appropriate mood and affect  Musculoskeletal: No gross asymmetry   Skin: Warm, Dry    Video Swallow :  XR VIDEO SWALLOW WITH SLP OR OT 2020 3:49 PM     HISTORY: Tracheostomy. Evaluate swallowing function.     FLUORO TIME:  2.0 " minutes.     PROCEDURE: The patient's swallowing mechanism is assessed under  fluoroscopy in conjunction with a speech pathologist. [place] and  pudding type consistencies are utilized. 5 fluoroscopic sequences are  obtained.     FINDINGS: The epiglottis has a truncated appearance. There is poor  downward movement and poor laryngeal elevation when the swallow is  triggered. Penetration is seen during the swallow to the level of the  vocal cords. There is no spontaneous cough reflex. Later, penetrated  contents can be seen extending below the vocal cords, consistent with  aspiration.                                                                      IMPRESSION: There is penetration and subsequent aspiration of ingested  contents, as described above.    KUB:  HISTORY: Feeding tube placement     COMPARISON: None.                                                                      IMPRESSION: Tip of the feeding tube is projected over the proximal  duodenum. Nonobstructive bowel gas pattern.     Lab Results   Component Value Date    WBC 9.2 12/07/2020     Lab Results   Component Value Date    RBC 3.64 12/07/2020     Lab Results   Component Value Date    HGB 11.8 12/07/2020     Lab Results   Component Value Date    HCT 35.0 12/07/2020     Lab Results   Component Value Date    MCV 96 12/07/2020     Lab Results   Component Value Date    MCH 32.4 12/07/2020     Lab Results   Component Value Date    MCHC 33.7 12/07/2020     Lab Results   Component Value Date    RDW 15.0 12/07/2020     Lab Results   Component Value Date     12/07/2020     Last Comprehensive Metabolic Panel:  Sodium   Date Value Ref Range Status   12/07/2020 139 133 - 144 mmol/L Final     Potassium   Date Value Ref Range Status   12/07/2020 3.3 (L) 3.4 - 5.3 mmol/L Final     Chloride   Date Value Ref Range Status   12/07/2020 101 94 - 109 mmol/L Final     Carbon Dioxide   Date Value Ref Range Status   12/07/2020 31 20 - 32 mmol/L Final     Anion Gap    Date Value Ref Range Status   12/07/2020 7 3 - 14 mmol/L Final     Glucose   Date Value Ref Range Status   12/07/2020 103 (H) 70 - 99 mg/dL Final     Urea Nitrogen   Date Value Ref Range Status   12/07/2020 5 (L) 7 - 30 mg/dL Final     Creatinine   Date Value Ref Range Status   12/07/2020 0.49 (L) 0.52 - 1.04 mg/dL Final     GFR Estimate   Date Value Ref Range Status   12/07/2020 >90 >60 mL/min/[1.73_m2] Final     Comment:     Non  GFR Calc  Starting 12/18/2018, serum creatinine based estimated GFR (eGFR) will be   calculated using the Chronic Kidney Disease Epidemiology Collaboration   (CKD-EPI) equation.       Calcium   Date Value Ref Range Status   12/07/2020 8.7 8.5 - 10.1 mg/dL Final     Bilirubin Total   Date Value Ref Range Status   12/07/2020 0.7 0.2 - 1.3 mg/dL Final     Alkaline Phosphatase   Date Value Ref Range Status   12/07/2020 119 40 - 150 U/L Final     ALT   Date Value Ref Range Status   12/07/2020 16 0 - 50 U/L Final     AST   Date Value Ref Range Status   12/07/2020 24 0 - 45 U/L Final     Assessment:  1. Likely head and neck cancer, near obstructing  2. Aspiration  3. Cirrhosis  4. Ascites  5. Alcohol dependence    Plan:   Ms. Tee is a 65 yo woman with chronic cirrhosis, previous high volume paracentesis with alcohol dependence who presented to ENT clinic with respiratory distress, patient underwent fiberoptic intubation and subsequent tracheostomy for head and neck cancer, pathology is still pending.  I was consulted for enteral access.  She currently has a feeding tube in place.  I performed a bedside ultrasound which showed mild/moderate ascites.  She does not currently follow with a hepatologist.  Her labs are relatively unremarkable (normal platelets, normal bilirubin, however albumin low at 2.6, INR normal).  I discussed the nature of feeding tubes and associated risks.  Given her current state and comorbid conditions, I feel it is high risk to perform a PEG or open  gastrostomy tube placement at this time.  Patient requires definitive treatment of her presume head/neck cancer with hopeful improvement in her aspiration with concurrent treatment.  During this time, patient has current enteral access.  Discussed with Dr. Canales.  May resume tube feeds at this time.        Dony Doherty, DO on 12/7/2020 at 9:55 AM

## 2020-12-07 NOTE — PROGRESS NOTES
[facility] Hospitalist Progress Note           Assessment & Plan         Karen Tee is a 64 year old female admitted on 12/3/2020. She presented to ENT clinic for evaluation of ear pain and was found to have respiratory distress and an unstable airway due to a laryngeal mass for which she underwent an emergent tracheostomy and is now admitted for further evaluation and cares.     Unstable airway, S/P emergency tracheotomy for assistance in breathing  Tracheostomy in place  Laryngeal/neck mass, biopsy showing squamous cell carcinoma, moderately to poorly differentiated   12/3/20 -- Underwent urgent tracheotomy with tracheostomy placement on 12/3 due to new finding of laryngeal mass causing unstable airway. Biopsies were taken during procedure.  - ENT placed all tracheostomy orders; for trach-specific questions contact ENT  - ENT consult for tracheostomy management  - NPO  - Speech consult, patient will need to pass swallow study before discharge; if fails would need a feeding tube     - Care Transitions consult to assist with obtaining necessary home equipment to manage a tracheostomy  12/4/2020 -- doing well so far - see ENT note for details/plan - likely replacing trach on Monday with possible discharge that pm or Tues.    12/5/2020 -- ordering supplies as below per ENT recommendations.  Feeding tube placed yesterday as below.     12/6/2020 -- replacing electrolytes as below, stopping tube feeds tonight to be NPO for possible surgery tomorrow.   12/7/2020 -- Unable to do G or J tube per surgery as below - continue feeding tube for now, sutured by ENT today.  Trach switched out today.  Pathology confirms squamous cell cancer.     - Will need outpatient follow-up for cancer work-up - ENT planning to help coordinate this.       Per ENT the patient needs the following supplies for home:  - An extra #6 cuffless Shiley tracheostomy tube with disposable inner cannula  - Multiple disposable inner cannulas for a #6  Shiley tracheostomy tube (enough for roughly 1 a day, 30-month, etc.)  - 10 or 12 Fr suction catheters for trach cares (enough for 3-4 per day)  - Multiple saline jets (3-5 mL) for tracheal lavage (3-4 per day)  - Portable suction machine  - Soft reusable Velcro trach ties     Hypomagnesemia  12/6/2020 -- replacing, follow.    12/7/2020 -- normalized       hypokalemia  12/7/2020 -- on replacement.  Advancing tube feeds.      Aspiration on swallow evaluation with Purulent drainage on trach replacement 12/7 concerning for possible aspiration pneumonia   12/4/2020 -- failed all consistencies on video swallow evaluation -   12/5/2020 -- ENT placed feeding tube yesterday, nutrition started tube feeds today,  12/7/2020 -- options discussed with surgery, but they determined that with her ascites that G or J-tube placement would not be a good idea at this time.  ENT noted some purulent drainage on switching out trach today.  Started on bactrim by ENT which I think is reasonable.  No other symptoms of aspiration pneumonia.       Habitual alcohol use  Alcoholic cirrhosis with ascites  12/3/20 -- Per chart review she has frequent alcohol use, although she says not daily, last drink 3-4 days ago. Denies prior history of withdrawal or seizures. Does appear to have prior paracentesis due to cirrhosis and ascites. LFTs normal on admission.   - No evidence of withdrawal on admission, so no CIWA indicated, but monitor for evidence and initiate CIWA as appropriate  12/7/2020 -- doing well so far.       Personal history of tobacco use, presenting hazards to health  Quit smoking 2 months ago. Has not been using nicotine replacement.      Constipation   12/6/2020 -- prn medications ordered per protocol        Asymptomatic COVID screen negative.       Diet:   NPO     DVT Prophylaxis: lovenox     Palm Catheter: not present     Code Status: Full Code  - discussed with patient on admission            Disposition  Hope for discharge likely  to TCU tomorrow if doing well.             Interval History:   Feels better after trach switched out today.  Minimal pain.  Breathing good.  No new concerns or worsening symptoms.    No other pain            Review of Systems:    ROS: 10 point ROS neg other than the symptoms noted above in the HPI.           Medications:   Current active medications and PTA medications reviewed, see medication list for details.            Physical Exam:   Vitals were reviewed  Patient Vitals for the past 24 hrs:   BP Temp Temp src Pulse Resp SpO2 Weight   20 1500 -- -- -- -- -- 97 % --   20 1400 (!) 165/85 -- -- 74 10 93 % --   20 1230 -- -- -- -- -- 92 % --   20 1200 (!) 168/75 -- -- 101 15 94 % --   20 1106 (!) 149/68 98.5  F (36.9  C) Oral 80 12 92 % --   20 1044 (!) 140/75 -- -- 98 8 97 % --   20 1001 (!) 160/100 -- -- 103 23 -- --   20 1000 (!) 160/100 -- -- 101 10 99 % --   20 0900 -- -- -- -- -- 98 % --   20 0748 (!) 152/74 98.6  F (37  C) Oral -- 16 -- --   20 0700 -- -- -- -- -- 97 % --   20 0600 (!) 153/77 -- -- 73 16 -- --   20 0432 -- -- -- -- -- -- 69.7 kg (153 lb 10.6 oz)   20 0431 139/72 -- -- 81 -- -- --   20 0400 -- -- -- 96 -- -- --   20 0345 -- -- -- 90 12 98 % --   20 0330 -- -- -- 76 8 97 % --   20 0200 (!) 150/84 -- -- 68 9 98 % --   20 0100 -- -- -- 78 11 96 % --   20 0000 133/58 -- -- 58 8 97 % --   20 2300 -- -- -- 79 12 96 % --   20 2200 119/70 -- -- 76 8 97 % --   20 2000 (!) 156/79 98.4  F (36.9  C) Oral 78 12 98 % --   20 1927 -- -- -- 76 -- -- --   20 1800 (!) 145/70 -- -- 73 10 98 % --   20 1700 -- -- -- -- -- 98 % --   20 1600 133/73 -- -- 91 16 98 % --       Temperatures:  Current - Temp: 98.5  F (36.9  C); Max - Temp  Av.5  F (36.9  C)  Min: 98.4  F (36.9  C)  Max: 98.6  F (37  C)  Respiration range: Resp  Av  Min: 8  Max:  23  Pulse range: Pulse  Av.5  Min: 58  Max: 103  Blood pressure range: Systolic (24hrs), Av , Min:119 , Max:168   ; Diastolic (24hrs), Av, Min:58, Max:100    Pulse oximetry range: SpO2  Av.5 %  Min: 92 %  Max: 99 %  I/O last 3 completed shifts:  In: 1975 [I.V.:1575; NG/GT:120]  Out: -     Intake/Output Summary (Last 24 hours) at 2020 1512  Last data filed at 2020 1500  Gross per 24 hour   Intake 1975 ml   Output --   Net 1975 ml     EXAM:  General: awake and alert, NAD, oriented x 3  Head: normocephalic  Neck: unremarkable, no lymphadenopathy   HEENT: oropharynx pink and moist    Heart: Regular rate and rhythm, no murmurs, rubs, or gallops  Lungs: clear to auscultation bilaterally with good air movement throughout  Abdomen: mild ascites, non-tender, not far from baseline per patient.    Extremities: no edema in lower extremities   Skin unremarkable.               Data:     Results for orders placed or performed during the hospital encounter of 20 (from the past 24 hour(s))   Glucose by meter   Result Value Ref Range    Glucose 112 (H) 70 - 99 mg/dL   Magnesium   Result Value Ref Range    Magnesium 1.9 1.6 - 2.3 mg/dL   Glucose by meter   Result Value Ref Range    Glucose 101 (H) 70 - 99 mg/dL   Glucose by meter   Result Value Ref Range    Glucose 93 70 - 99 mg/dL   Magnesium   Result Value Ref Range    Magnesium 1.9 1.6 - 2.3 mg/dL   Phosphorus   Result Value Ref Range    Phosphorus 4.2 2.5 - 4.5 mg/dL   Comprehensive metabolic panel   Result Value Ref Range    Sodium 139 133 - 144 mmol/L    Potassium 3.3 (L) 3.4 - 5.3 mmol/L    Chloride 101 94 - 109 mmol/L    Carbon Dioxide 31 20 - 32 mmol/L    Anion Gap 7 3 - 14 mmol/L    Glucose 103 (H) 70 - 99 mg/dL    Urea Nitrogen 5 (L) 7 - 30 mg/dL    Creatinine 0.49 (L) 0.52 - 1.04 mg/dL    GFR Estimate >90 >60 mL/min/[1.73_m2]    GFR Estimate If Black >90 >60 mL/min/[1.73_m2]    Calcium 8.7 8.5 - 10.1 mg/dL    Bilirubin Total 0.7 0.2 - 1.3  mg/dL    Albumin 2.6 (L) 3.4 - 5.0 g/dL    Protein Total 6.8 6.8 - 8.8 g/dL    Alkaline Phosphatase 119 40 - 150 U/L    ALT 16 0 - 50 U/L    AST 24 0 - 45 U/L   CBC with platelets   Result Value Ref Range    WBC 9.2 4.0 - 11.0 10e9/L    RBC Count 3.64 (L) 3.8 - 5.2 10e12/L    Hemoglobin 11.8 11.7 - 15.7 g/dL    Hematocrit 35.0 35.0 - 47.0 %    MCV 96 78 - 100 fl    MCH 32.4 26.5 - 33.0 pg    MCHC 33.7 31.5 - 36.5 g/dL    RDW 15.0 10.0 - 15.0 %    Platelet Count 210 150 - 450 10e9/L   Glucose by meter   Result Value Ref Range    Glucose 86 70 - 99 mg/dL   Potassium   Result Value Ref Range    Potassium 3.5 3.4 - 5.3 mmol/L   Glucose by meter   Result Value Ref Range    Glucose 105 (H) 70 - 99 mg/dL           Attestation:  I have reviewed today's vital signs, notes, medications, labs and imaging.  Amount of time spent in direct patient care: 30 minutes.     Hoang Boggs MD, MD

## 2020-12-07 NOTE — PROGRESS NOTES
Pt has thick yellow secretions today which are malodorous.. Pt panicked this morning after coughing and secretions could not be expelled. Trach suctioned, pt given reassurance. medicated with dilaudid. Pt now resting comfortably, HOB up 90 degrees, Trach cares done, inner cannula changed which had thick dried secretions dried in the cannula..

## 2020-12-07 NOTE — PROGRESS NOTES
Pt had small to moderate amounts of thick/tan foul smelling secretions from inside trach site. Writer performed trach cares and replaced the patient's mepilex lite to the trach site Q4hrs and prn. The patient was able to cough up secretions from her trach on her own about 50% of the time, and required suctioning from the trach approx 3 times this shift. TF's were stopped at 0000, d/t patient to have Peg tube place this AM.

## 2020-12-07 NOTE — PROGRESS NOTES
Patient is postoperative day 4 following awake fiberoptic intubation and tracheostomy.  She currently has a #6 cuffed Shiley in place secured with trach ties and suture.  There is discussion about placing a PEG tube, however given her a series, this is at high risk for complication.  She currently has an NG feeding tube that I placed last Friday to keep in place.  The patient's pathology did come back today as moderate to poorly differentiated squamous cell carcinoma, p16 negative.  The patient will have a trach change today.    On exam, the patient is alert and oriented.  She has a NG feeding tube in the right naris secured with tape.  The tape was removed and I anesthetized the membranous septum using 1% lidocaine with 1:100,000 epinephrine.  A 2-0 silk suture was used to secure the NG feeding tube to the membranous septum.    Next, the silk sutures were removed from the neck trach site.  The patient's cuffed Shiley was removed.  The stoma appeared to be healing well.  There was some foul-smelling turbid tracheal drainage.  The trach site was suctioned.  A new #6 uncuffed Shiley was placed and secured with soft trach ties.  Passy-Loreta valve was placed on the trach and the patient was able to phonate very well.    Patient is postop day 4 and doing well.  Her trach was changed to a cuffless trach today.  I secured her NG feeding tube to her septum with a 2-0 silk at the bedside.  I would consider placing the patient on antibiotics for possible and required versus hospital-acquired pneumonia given the foul-smelling tracheal drainage.  I discussed with the medicine team on which antibiotic to place the patient.  We will continue for education regarding trach cares.  She will need outpatient follow-up with PET/CT imaging.  She will likely require total laryngectom/glossectomy and bilateral neck dissection with postoperative chemoradiation.    Marshall Canales MD  Department of Otolarygology-Head and Neck Surgery  [facility]  [place]

## 2020-12-07 NOTE — PROGRESS NOTES
CLINICAL NUTRITION SERVICES - BRIEF NOTE         Nutrition Prescription    RECOMMENDATIONS FOR MDs/PROVIDERS TO ORDER:  MD to continue decreasing IVF rate as TF advances towards goal. At goal, total water (IVF + flushes + TF) = 2892 mL/day.      Recommendations already ordered by Registered Dietitian (RD):  -TF rate to 30 mL/hr x 8 hrs.  -If patient continues to tolerate as expected, increase by 10 mL/hr q 6 hrs to goal rate of 50 mL/hr x 24 hrs. Isosource 1.5 @ goal 50 ml/hr (1200 ml/day) to provide 1800 kcals (27 kcal/kg/day), 82 g PRO (1.2 g/kg/day), 211 g CHO, 71 g Fat, 18 g Fiber and 912 mL free water daily. This will meet 100% of calorie and protein needs.   -water flushes: continue at 30 mL q 4 hrs while IVF infusing.     Future/Additional Recommendations:  -RD to adjust fluid flushes prior to discharge.       Nutrition Support:  -tolerating feeds well. Rate increased to 30 mL/hr yesterday. Feeds were held at midnight due to possible PEG/PEJ placement today. Per discussion with Dr. Boggs, patient's ascites puts her at too high of a risk for the procedure, so they are keeping the ND tube in place. Hope is to discharge to a TCU tomorrow.  -per discussion with Carleen BULL, TF restarted at 20 mL/hr this morning.   -water flushes: 30 mL q 4 hrs.    Labs:  -K+ 3.5 ([facility]) - however, was low this morning (3.3). Was not replaced, so initial draw may have been an error.  -Mag 1.9 ([facility])  -Phos 4.2 ([facility])    Meds:  IVF (LR) @ 75 mL/hr    Recommendations:  -informed RN to increase TF rate to previous at 30 mL/hr x 8 hrs.   -If patient continues to tolerate as expected, increase by 10 mL/hr q 6 hrs to goal rate of 50 mL/hr x 24 hrs. Isosource 1.5 @ goal 50 ml/hr (1200 ml/day) to provide 1800 kcals (27 kcal/kg/day), 82 g PRO (1.2 g/kg/day), 211 g CHO, 71 g Fat, 18 g Fiber and 912 mL free water daily. This will meet 100% of calorie and protein needs.   -water flushes: continue at 30 mL q 4 hrs while IVF infusing. Total free  water at goal (TF + IVF + flushes) = 2892 mL/day.  -RD to adjust fluid flushes prior to discharge.      Ana Maria Daniel RDN, LD  Clinical Dietitian  Office: [phone]  Weekend Pager: [phone]

## 2020-12-07 NOTE — PROGRESS NOTES
Pt has been in good spirits today, reading home care instructions on care of a tracheostomy. Pain well managed. Pt is also tolerating tube feeding. Does feel a bit bloated.  Has had thick tan to yellow secretions, required suctioning 4 times in 12 hours, otherwise is able to cough out secretions. Lungs are slightly coarse throughout tonight.. O2 trach dome at 28 %..Magnesium replaced. Pt to perform own trach care in the AM..

## 2020-12-08 NOTE — PROGRESS NOTES
Transported patient per bed to room 1010 from 1002,a ll belongs and equipment transferred from old room to new room.  [facility] TRANSPORT NOTE  Data:   Reason for Transport:  Needed ICU room available for admission in main part of ICU department. , housekeeping only available until 10 pm.       Karen Tee was transported from 1002 to room 1010 via bed at 2115.  Patient was accompanied by Registered Nurse and Nursing Assistant. Equipment used for transport: Cardiac monitor , Pulse oximeter, Blood pressure monitor, IV pump and feeding tube pump.. Family was aware of reason for transport: no    Action:  Report: N/A- same nurse to continue cares in ICU.    Response:  Patient's condition was unchanged.    Cindy Johanson, RN

## 2020-12-08 NOTE — PROGRESS NOTES
Described and demonstrated self suctioned. Tolerated well. No adverse reaction noted at this time. As able will have patient demonstrate process with fewer cues. Continue to follow.

## 2020-12-08 NOTE — DISCHARGE INSTRUCTIONS
Nutrition (Tube Feeding)    Formula: Isosource 1.5  Route: ND tube  Rate: continuous at 50 mL/hr.  Free water flushes: 150 mL q 4 hrs.    TF formula provides: 1200 mL total volume, 1800 kcals (27 kcal/kg/day), 82 g PRO (1.2 g/kg/day), 211 g CHO, 71 g Fat, 18 g Fiber and 912 mL free water daily. This meets 100% of calorie and protein needs. Total free water (flushes + TF) = 1812 mL/day.    Ana Maria Daniel RDN, ADRIENNE  Clinical Dietitian  Office: [phone]  Weekend Pager: [phone]

## 2020-12-08 NOTE — PROGRESS NOTES
CLINICAL NUTRITION SERVICES - BRIEF NOTE        Per discussion in rounds, patient is ready to discharge today to a care facility. IV is saline locked, so RD will increase water flushes to 150 mL q 4 hrs.      RD to enter the following TF information in the discharge instructions:    Formula: Isosource 1.5  Route: ND tube  Rate: continuous at 50 mL/hr.  Free water flushes: 150 mL q 4 hrs.    TF formula provides: 1200 mL total volume, 1800 kcals (27 kcal/kg/day), 82 g PRO (1.2 g/kg/day), 211 g CHO, 71 g Fat, 18 g Fiber and 912 mL free water daily. This meets 100% of calorie and protein needs. Total free water (flushes + TF) = 1812 mL/day.      Ana Maria Daniel RDN, LD  Clinical Dietitian  Office: [phone]  Weekend Pager: [phone]

## 2020-12-08 NOTE — PROGRESS NOTES
[facility] LTACH Unit         I reviewed the chart of Karen Tee on 12/7 for potential LTACH admission at the request of Tracee Oliva RN . After thorough chart review and peer discussion we are not able to accept the pt for LTACH admission. She does not meet complex medical criteria that would justify a daily doctor visit.            Nissa Henry RN  Referral Specialist    [facility]ACH [facility]  [address] [place] [id number]  [email or web address]  [email or web address]  Office: [phone]  Fax: [phone]

## 2020-12-08 NOTE — PROGRESS NOTES
Patient up ad quintin in the room and tolerating activity well.  She had a large BM. Has a productive cough and is using an oral suction to help clear it.  Trach ties are secure and patient is feeling more comfortable with it.  She did speak with the surgeon and is aware of the biopsy results.  No other additional needs at this time.

## 2020-12-08 NOTE — PROGRESS NOTES
[facility]           Assessment & Plan        Karen Tee is a 64 year old female admitted on 12/3/2020. She presented to ENT clinic for evaluation of ear pain and was found to have respiratory distress and an unstable airway due to a laryngeal mass for which she underwent an emergent tracheostomy and is now admitted for further evaluation and cares.     Unstable airway, S/P emergency tracheotomy for assistance in breathing  Tracheostomy in place  Laryngeal/neck mass, biopsy showing squamous cell carcinoma, moderately to poorly differentiated   12/3/20 -- Underwent urgent tracheotomy with tracheostomy placement on 12/3 due to new finding of laryngeal mass causing unstable airway. Biopsies were taken during procedure.  - ENT placed all tracheostomy orders; for trach-specific questions contact ENT  - ENT consult for tracheostomy management  - NPO  - Speech consult, patient will need to pass swallow study before discharge; if fails would need a feeding tube     - Care Transitions consult to assist with obtaining necessary home equipment to manage a tracheostomy  12/4/2020 -- doing well so far - see ENT note for details/plan - likely replacing trach on Monday with possible discharge that pm or Tues.    12/5/2020 -- ordering supplies as below per ENT recommendations.  Feeding tube placed yesterday as below.     12/6/2020 -- replacing electrolytes as below, stopping tube feeds tonight to be NPO for possible surgery tomorrow.   12/7/2020 -- Unable to do G or J tube per surgery as below - continue feeding tube for now, sutured by ENT today.  Trach switched out today.  Pathology confirms squamous cell cancer.    12/8/2020 -- plan for discharge with trach and nasal feeding tube - likely to LTAC vs TCU tomorrow.      - Will need outpatient follow-up for cancer work-up - ENT planning to help coordinate this.       Per ENT the patient needs the following supplies for home:  - An extra #6  "cuffless Shiley tracheostomy tube with disposable inner cannula  - Multiple disposable inner cannulas for a #6 Shiley tracheostomy tube (enough for roughly 1 a day, 30-month, etc.)  - 10 or 12 Fr suction catheters for trach cares (enough for 3-4 per day)  - Multiple saline jets (3-5 mL) for tracheal lavage (3-4 per day)  - Portable suction machine  - Soft reusable Velcro trach ties     Hypomagnesemia  12/6/2020 -- replacing, follow.    12/7/2020 -- normalized       hypokalemia  12/7/2020 -- on replacement.  Advancing tube feeds.    12/8/2020 -- normal this AM.       Aspiration on swallow evaluation with Purulent drainage on trach replacement 12/7 concerning for possible aspiration pneumonia   12/4/2020 -- failed all consistencies on video swallow evaluation -   12/5/2020 -- ENT placed feeding tube yesterday, nutrition started tube feeds today,  12/7/2020 -- options discussed with surgery, but they determined that with her ascites that G or J-tube placement would not be a good idea at this time.  ENT noted some purulent drainage on switching out trach today.  Started on bactrim by ENT which I think is reasonable.  No other symptoms of aspiration pneumonia.    12/8/2020 -- doing well, day 2/7 bactrim.       Habitual alcohol use  Alcoholic cirrhosis with ascites  12/3/20 -- Per chart review she has frequent alcohol use, although she says not daily, last drink 3-4 days ago. Denies prior history of withdrawal or seizures. Does appear to have prior paracentesis due to cirrhosis and ascites. LFTs normal on admission.   - No evidence of withdrawal on admission, so no CIWA indicated, but monitor for evidence and initiate CIWA as appropriate  12/8/2020 -- doing well so far.   confirmed with patient that ascites feel at long-term baseline for her, not increased like when she last needed a paracentesis \"a number of years ago\".        Personal history of tobacco use, presenting hazards to health  Quit smoking 2 months ago. Has " not been using nicotine replacement.      Constipation   12/6/2020 -- prn medications ordered per protocol        Asymptomatic COVID screen negative.       Diet:   NPO     DVT Prophylaxis: lovenox     Paml Catheter: not present     Code Status: Full Code  - discussed with patient on admission              Disposition  Doing well, hope for discontinue to LTAC vs TCU once bed available.              Interval History:   No new concerns.  Pain controlled.  No fever or chills.  No new changes.    No other pain             Review of Systems:    ROS: 10 point ROS neg other than the symptoms noted above in the HPI.           Medications:   Current active medications and PTA medications reviewed, see medication list for details.            Physical Exam:   Vitals were reviewed  Patient Vitals for the past 24 hrs:   BP Temp Temp src Pulse Resp SpO2 Weight   12/08/20 1100 139/74 99  F (37.2  C) Oral -- -- 95 % --   12/08/20 0743 (!) 144/72 98.9  F (37.2  C) Oral 82 12 95 % --   12/08/20 0700 -- -- -- -- -- 96 % 69.8 kg (153 lb 14.1 oz)   12/08/20 0630 -- -- -- 78 19 98 % --   12/08/20 0600 (!) 143/72 -- -- 75 8 95 % --   12/08/20 0530 -- -- -- 73 8 95 % --   12/08/20 0500 (!) 143/76 -- -- 73 12 96 % --   12/08/20 0430 -- -- -- 80 8 95 % --   12/08/20 0400 129/76 99  F (37.2  C) Oral 64 13 94 % --   12/08/20 0330 -- -- -- 76 12 94 % --   12/08/20 0300 (!) 163/85 -- -- 98 11 93 % --   12/08/20 0230 -- -- -- 96 16 98 % --   12/08/20 0200 125/71 -- -- 79 11 94 % --   12/08/20 0100 (!) 148/67 -- -- 68 10 91 % --   12/08/20 0030 -- -- -- 107 20 97 % --   12/08/20 0019 -- -- -- 81 -- -- --   12/08/20 0000 (!) 145/74 98.8  F (37.1  C) Oral 85 14 94 % --   12/07/20 2330 -- -- -- 77 9 93 % --   12/07/20 2300 (!) 140/71 -- -- 84 12 99 % --   12/07/20 2230 -- -- -- 79 10 93 % --   12/07/20 2200 116/52 -- -- 74 11 92 % --   12/07/20 2130 -- -- -- 80 11 97 % --   12/07/20 2100 -- -- -- 78 8 92 % --   12/07/20 2030 -- -- -- 105 10 96 % --    20 138/66 -- -- 79 11 94 % --   20 -- -- -- 97 -- -- --   20 1930 (!) 142/75 -- -- 75 10 95 % --   20 1927 (!) 142/75 98.6  F (37  C) Oral 98 11 95 % --   20 1700 -- -- -- 76 10 -- --   20 1600 (!) 152/83 -- -- 108 24 93 % --   20 1520 (!) 156/80 99.4  F (37.4  C) Oral 90 12 94 % --   20 1500 -- -- -- -- -- 97 % --       Temperatures:  Current - Temp: 99  F (37.2  C); Max - Temp  Av  F (37.2  C)  Min: 98.6  F (37  C)  Max: 99.4  F (37.4  C)  Respiration range: Resp  Av  Min: 8  Max: 24  Pulse range: Pulse  Av.3  Min: 64  Max: 108  Blood pressure range: Systolic (24hrs), Av , Min:116 , Max:163   ; Diastolic (24hrs), Av, Min:52, Max:85    Pulse oximetry range: SpO2  Av.8 %  Min: 91 %  Max: 99 %  I/O last 3 completed shifts:  In: 2800 [I.V.:1800; NG/GT:210]  Out: -     Intake/Output Summary (Last 24 hours) at 2020 1410  Last data filed at 2020 1200  Gross per 24 hour   Intake 2580 ml   Output --   Net 2580 ml     EXAM:  General: awake and alert, NAD, oriented x 3  Head: normocephalic  Neck: unremarkable, no lymphadenopathy   HEENT: oropharynx pink and moist    Heart: Regular rate and rhythm, no murmurs, rubs, or gallops  Lungs: clear to auscultation bilaterally with good air movement throughout  Abdomen: soft, distention unchanged.    Extremities: no edema in lower extremities   Skin unremarkable.               Data:     Results for orders placed or performed during the hospital encounter of 20 (from the past 24 hour(s))   Glucose by meter   Result Value Ref Range    Glucose 99 70 - 99 mg/dL   Comprehensive metabolic panel   Result Value Ref Range    Sodium 137 133 - 144 mmol/L    Potassium 3.8 3.4 - 5.3 mmol/L    Chloride 101 94 - 109 mmol/L    Carbon Dioxide 30 20 - 32 mmol/L    Anion Gap 6 3 - 14 mmol/L    Glucose 117 (H) 70 - 99 mg/dL    Urea Nitrogen 7 7 - 30 mg/dL    Creatinine 0.46 (L) 0.52 - 1.04 mg/dL     GFR Estimate >90 >60 mL/min/[1.73_m2]    GFR Estimate If Black >90 >60 mL/min/[1.73_m2]    Calcium 8.9 8.5 - 10.1 mg/dL    Bilirubin Total 0.4 0.2 - 1.3 mg/dL    Albumin 2.5 (L) 3.4 - 5.0 g/dL    Protein Total 6.6 (L) 6.8 - 8.8 g/dL    Alkaline Phosphatase 116 40 - 150 U/L    ALT 14 0 - 50 U/L    AST 20 0 - 45 U/L   Magnesium   Result Value Ref Range    Magnesium 2.0 1.6 - 2.3 mg/dL   Phosphorus   Result Value Ref Range    Phosphorus 3.5 2.5 - 4.5 mg/dL   CBC with platelets   Result Value Ref Range    WBC 9.5 4.0 - 11.0 10e9/L    RBC Count 3.46 (L) 3.8 - 5.2 10e12/L    Hemoglobin 11.4 (L) 11.7 - 15.7 g/dL    Hematocrit 33.6 (L) 35.0 - 47.0 %    MCV 97 78 - 100 fl    MCH 32.9 26.5 - 33.0 pg    MCHC 33.9 31.5 - 36.5 g/dL    RDW 15.0 10.0 - 15.0 %    Platelet Count 207 150 - 450 10e9/L           Attestation:  I have reviewed today's vital signs, notes, medications, labs and imaging.  Amount of time spent in direct patient care: 25 minutes.     Hoang Boggs MD, MD

## 2020-12-08 NOTE — PLAN OF CARE
Problem: Adult Inpatient Plan of Care  Goal: Plan of Care Review  Outcome: No Change  Goal: Patient-Specific Goal (Individualized)  Outcome: No Change  Goal: Absence of Hospital-Acquired Illness or Injury  Outcome: No Change  Intervention: Prevent Skin Injury  Recent Flowsheet Documentation  Taken 12/8/2020 0743 by Jaye Soto RN  Body Position: position changed independently  Goal: Optimal Comfort and Wellbeing  Outcome: No Change  Goal: Readiness for Transition of Care  Outcome: No Change     Problem: Patient Goal: Social Work Focus  Goal: 1. Patient Goal: Care Coordination / Social Work  Outcome: No Change  Goal: 2. Patient Goal: Care Coordination / Social Work Focus  Outcome: No Change  Goal: 3. Patient Goal: Care Coordination / Social Work Focus  Outcome: No Change

## 2020-12-08 NOTE — PROGRESS NOTES
"Addendum at 4:15 PM:  SW received phone call from Lindsey GARCIA, educating that pt cannot have staff (RN/RT) suction pt more than 2x/8 hour shift.      MAXWELL called to ICU Charge RN and informed of above & requested that RN document when doing suction to pt.     Pt IS ABLE to self suction as much as needed.  LUTHER Ramesh on 12/8/2020 at 4:28 PM    Addendum @ 3:40 PM:  [facility].  Nhung in admissions, [phone], requested an updated Covd test & for MD to stop all IV pain medications, must be orals.    If accepted for cares, pt will need a ride via [email or web address] by 4 PM tomorrow.    PAS-RR  Per DHS regulation, CTS team completed and submitted PAS-RR to [place] [employer] Direct Connect via the [employer]. CTS team advised SNF and they are aware a PAS-RR has been submitted.     CTS team reviewed with pt or health care agent that they may be contacted for a follow up appointment within 10 days of hospital discharge if SNF stay is <30 days. Contact information for [employer] was also provided.     Pt or health care agent verbalized understanding.     PAS-RR #  [id number]  LUTHER Ramesh on 12/8/2020 at 3:42 PM    Addendum @ 1440:  Call received from Jh @ [facility] ([phone]).  He states patient meets criteria for acceptance.  He does not current have bed availability.  Likely bed availability \"end of the week.\"  CTS to reconnect with Jh 12/9.      Care Management Follow Up    Length of Stay (days): 5    Expected Discharge Date: 12/08/20     Concerns to be Addressed: discharge planning  Pt with new trach & possibly may need feeding tube placed prior to discharge  Patient plan of care discussed at interdisciplinary rounds: Yes    Anticipated Discharge Disposition: Skilled Nursing Facilty;LTACH     Anticipated Discharge Services: None  Anticipated Discharge DME: Oxygen    Patient/family educated on Medicare " website which has current facility and service quality ratings: yes  Education Provided on the Discharge Plan:  yes  Patient/Family in Agreement with the Plan: yes    Referrals Placed by CM/SW: Post Acute Facilities  Private pay costs discussed: Not applicable    Additional Information:  Patient ready for discharge medically per MD.  Met with patient and discussed discharge planning with patient.  She states she is unable to manage safely at home, she feels it is best to discharge to TCU/LTACH for further education/cares.      Plan to discharge with sutured NG tube for nutrition.      Notified [place] [facility] LTACH unit declined (see Nissa Sheets note 12/8).  Referred to [facility] for review.  Spoke with Jh, clinical liaison ([phone]).  Jh will return CTS call with determination.      In addition, TCU referrals pending as below:    [facility] at [facility] (Main Phone: [phone] Admissions phone: [phone] Fax: [phone])--pending     [facility] (Admissions: [phone] Main Phone: [phone] Fax: [phone])--pending--left message     [facility] -- pending-spoke with Lindsey in admission.  They are reviewing..      Tracee SNEEDN RN  Inpatient Care Coordinator  [facility] [phone]  [facility] [phone]

## 2020-12-08 NOTE — PLAN OF CARE
Settled in room 1010 end of evening shift.  Continued to be independent up to BR once assisted with equipment removal so can be independent with ADL's.  Likes to walk around room to give back a break from bed.  At [place] with cares did increase tube feedings to 50 ml/ hour.  Tolerating such well thru am at this rate.  Did change inner cannula of trachea and did trachea cares prior to placement of Trach. Dome at 28% 10 Liters of humidity air flow.  Left this on until this am at 0645 when asked to go back to RA,  speaking valve removed for night at 2400 and replaced this am at 0645.  Tolerated this well.  Did suction trach only once when unable to cough up phelgm to mouth to suction.  Has been coughing up yellow to tan thick mucus and able to suction from mouth usually with mali suction herself.  Unable to do so at 0500.   Plan of care continues.  Medicated for comfort with Oxycodone X 2 thru this past 12 hour shift with good relief obtained.  Continue to monitor.

## 2020-12-09 PROBLEM — Z93.0 TRACHEOSTOMY STATUS (H): Status: ACTIVE | Noted: 2020-01-01

## 2020-12-09 NOTE — PROGRESS NOTES
Care Management Discharge Note    Discharge Date: 12/09/20       Discharge Disposition: Transitional Care; pt is accepted to [facility] located at: [address] [place] [address], [id number] P: [phone]/ F: [phone]    RN to RN report [phone].  All trach supplies in pt's room is to go with her to TCU.      [facility] stretcher ride between 130 - 2PM    Discharge Services: None    Discharge DME: Trach & NG feeding supplies  Discharge Transportation: agency    Private pay costs discussed: Not applicable    PAS Confirmation Code:   [id number]  Patient/family educated on Medicare website which has current facility and service quality ratings: yes  Education Provided on the Discharge Plan:  Yes  Persons Notified of Discharge Plans: Patient  Patient/Family in Agreement with the Plan: yes    Handoff Referral Completed: Yes    Additional Information:  Pt to transport to [facility] via [facility] stretcher between 130-2pm    LUTHER Ramesh      Care Management Follow Up    Length of Stay (days): 6    Expected Discharge Date: 12/08/20     Concerns to be Addressed: discharge planning  Pt with new trach & possibly may need feeding tube placed prior to discharge  Patient plan of care discussed at interdisciplinary rounds: Yes    Anticipated Discharge Disposition: Skilled Nursing Facilty;LTACH     Anticipated Discharge Services: None  Anticipated Discharge DME: Oxygen    Patient/family educated on Medicare website which has current facility and service quality ratings: yes  Education Provided on the Discharge Plan:    Patient/Family in Agreement with the Plan: yes    Referrals Placed by CM/SW: Post Acute Facilities  Private pay costs discussed: NA at this time    Additional Information:  MAXWELL reviewed pt's EMR, covid test is negative.  Called Lindsey at [facility], [phone], who shared that if a pt is accepted at an LTACH, they will not be accepted at [facility].  MAXWELL requested an exception to this policy based on  the [facility] bed is still days away, pt wishes to remain in the [facility] system for all follow up cares & pt was medically stable for discharge to a facility yesterday.    Lindsey will meet with the IDT team & notify this writer before our IDT rounds.    LUTHER Ramesh

## 2020-12-09 NOTE — PROGRESS NOTES
Patient is postoperative day 6 following awake fiberoptic intubation and tracheostomy.  She currently has an uncuffed #6 Shiley.  We are deferring PEG tube placement at this time given her ascites.  She currently has an NG feeding tube in place.  Final pathology on her DL with biopsy was moderate to poorly differentiated squamous cell carcinoma, p16 negative.  Plans are for her to go to a transitional care facility to help with trach and feeding tube cares.    On exam, the patient is breathing well.  She is tolerating her Passy-Loreta and communicating appropriately.  She has an NG feeding tube stitched to the membranous septum in the right naris.  She has a #6 uncuffed Shiley tracheostomy tube in place secured with self trach ties with minimal secretions.  Trach site is healing well.    Overall the patient is doing very well.  The plan will be to discharge her to transitional care.  She will need follow-up with consultations including otolaryngology of [facility], radiation oncology, medical oncology.  We will also need to obtain a PET CT scan for staging.    We discussed tracheostomy cares and encouraged her to lavage with saline at least twice daily followed by suctioning.  We discussed how to obtain a measured suction depth spite measuring about a centimeter past the tracheostomy tube tip (this can be done by measuring on the inner cannula, going 1 cm past, and then marking the suction catheter).    If there are any questions with this patient, feel free to contact me.    Marshall Canales MD  Department of Otolarygology-Head and Neck Surgery  [facility]

## 2020-12-09 NOTE — DISCHARGE SUMMARY
[facility] Discharge Summary    Karen Tee MRN# [id number]   Age: 64 year old YOB: 1955     Date of Admission:  12/3/2020  Date of Discharge::  2020  Admitting Physician:  Moy Mckeon MD  Discharge Physician:  Hoang Boggs MD, MD             Admission Diagnoses:   Laryngeal mass [J38.7]  Neck mass [R22.1]  Shortness of breath [R06.02]  Personal history of tobacco use, presenting hazards to health [Z87.891]          Principle Discharge Diagnosis:       Laryngeal/neck mass, biopsy showing squamous cell carcinoma, moderately to poorly differentiated   Unstable airway, S/P emergency tracheotomy for assistance in breathing  Tracheostomy in place    See hospital course for further active diagnoses addressed during this admission.            Procedures:   Tracheostomy placement on admission, then switched out trach  - see ENT notes for details.           Medications Prior to Admission:     Medications Prior to Admission   Medication Sig Dispense Refill Last Dose     multivitamin w/minerals (THERA-VIT-M) tablet Take 1 tablet by mouth daily   Past Week at Unknown time     predniSONE (DELTASONE) 20 MG tablet Take 3 tabs by mouth daily x 3 days, then 2 tabs daily x 3 days, then 1 tab daily x 3 days, then 1/2 tab daily x 3 days. 20 tablet 0 Past Month at [place]-done     albuterol (PROAIR HFA/PROVENTIL HFA/VENTOLIN HFA) 108 (90 Base) MCG/ACT inhaler Inhale 2 puffs into the lungs every 4 hours as needed for shortness of breath / dyspnea or wheezing 18 g 3 new not yet     albuterol (PROAIR HFA/PROVENTIL HFA/VENTOLIN HFA) 108 (90 Base) MCG/ACT inhaler Inhale 6 puffs into the lungs every 6 hours as needed for shortness of breath / dyspnea or wheezing 1 Inhaler 0 dc, re-ordered     [] predniSONE (DELTASONE) 10 MG tablet Take 3 tablets (30 mg) by mouth daily for 5 days 15 tablet 0 Unknown at Unknown time             Discharge Medications:     Current Discharge Medication List       START taking these medications    Details   bisacodyl (DULCOLAX) 10 MG suppository Place 1 suppository (10 mg) rectally daily as needed for constipation  Qty:      Associated Diagnoses: Constipation, unspecified constipation type      magnesium hydroxide (MILK OF MAGNESIA) 400 MG/5ML suspension Take 30 mLs by mouth daily as needed for constipation  Qty:      Associated Diagnoses: Constipation, unspecified constipation type      oxyCODONE (ROXICODONE) 5 MG/5ML solution Take 5 mLs (5 mg) by mouth every 4 hours as needed for moderate to severe pain  Qty: 160 mL, Refills: 0    Associated Diagnoses: Tracheostomy care (H)      sulfamethoxazole-trimethoprim (BACTRIM/SEPTRA) 8 mg/mL suspension 20 mLs (160 mg) by Per Feeding Tube route 2 times daily for 4 days    Comments: Dose based on TMP component.  Associated Diagnoses: Pneumonia due to infectious organism, unspecified laterality, unspecified part of lung         CONTINUE these medications which have NOT CHANGED    Details   multivitamin w/minerals (THERA-VIT-M) tablet Take 1 tablet by mouth daily      !! albuterol (PROAIR HFA/PROVENTIL HFA/VENTOLIN HFA) 108 (90 Base) MCG/ACT inhaler Inhale 2 puffs into the lungs every 4 hours as needed for shortness of breath / dyspnea or wheezing  Qty: 18 g, Refills: 3    Comments: Pharmacy may dispense brand covered by insurance (Proair, or proventil or ventolin or generic albuterol inhaler)  Associated Diagnoses: Laryngeal mass; Neck mass; Shortness of breath; Personal history of tobacco use, presenting hazards to health      !! albuterol (PROAIR HFA/PROVENTIL HFA/VENTOLIN HFA) 108 (90 Base) MCG/ACT inhaler Inhale 6 puffs into the lungs every 6 hours as needed for shortness of breath / dyspnea or wheezing  Qty: 1 Inhaler, Refills: 0    Comments: Pharmacy may dispense brand covered by insurance (Proair, or proventil or ventolin or generic albuterol inhaler)  Associated Diagnoses: Wheezing       !! - Potential duplicate medications  found. Please discuss with provider.      STOP taking these medications       predniSONE (DELTASONE) 10 MG tablet Comments:   Reason for Stopping:         predniSONE (DELTASONE) 20 MG tablet Comments:   Reason for Stopping:                     Consultations:   Consultation during this admission received from ENT          Brief History of Illness:     From Admission H+P:   Karen Tee is a 64 year old female who presented to ENT clinic for evaluation of ear pain.     Patient unable to speak currently due to tracheostomy. She can answer yes/no questions and wrote a few things on a whiteboard. Otherwise history is from chart review and Dr. Canales.     Patient presented to clinic for evaluation of ear pain.   Pain had been present for about 1.5 months, initially bilateral, but then worse on the left.   She has also had some neck fullness and thought her glands were swollen. She has had some difficulty swallowing but no pain.      While in clinic it was noted that patient was having difficulty breathing and was short of breath. This had been present for the past 1.5 months and gradually worsening since onset.  She had been treated twice recently with prednisone, a Z-violetta, and albuterol without significant relief.      Dr. Canales performed a flex laryngoscopy and found a large right-sided mass in the epiglottis, right aryepiglottic fold, and right arytenoid.  There was concern the mass was compressing the airway enough to make it unstable.  After discussion with various medical staff it was determined that it was safest to perform a tracheotomy / place a tracheostomy at [facility] rather than await bed availability and risk transfer with unstable airway to [facility].      Patient is now post- tracheostomy placement. She feels tired but denies pain. Denies respiratory distress. No recent illness, fever, chills. No cough. Recently quit smoking around the time her symptoms started, has not  "needed nicotine. Drinks frequently but not daily.             TODAY:     Subjective:  Doing well, no new concerns. Feels ready for discharge to TCU today.    No other pain.       ROS:   ROS: 10 point ROS neg other than the symptoms noted above in the HPI.   BP (!) 158/70 (BP Location: Left arm)   Pulse 93   Temp 98.8  F (37.1  C) (Oral)   Resp 21   Ht 1.6 m (5' 2.99\")   Wt 70 kg (154 lb 5.2 oz)   LMP 05/19/2007   SpO2 99%   BMI 27.34 kg/m     EXAM:  General: awake and alert, NAD, oriented x 3  Head: normocephalic  Neck: unremarkable, no lymphadenopathy   HEENT: oropharynx pink and moist    Heart: Regular rate and rhythm, no murmurs, rubs, or gallops  Lungs: clear to auscultation bilaterally with good air movement throughout  Abdomen: soft, non-tender, ascites at baseline   Extremities: no edema in lower extremities   Skin unremarkable.           Hospital Course:     Karen Tee is a 64 year old female admitted on 12/3/2020. She presented to ENT clinic for evaluation of ear pain and was found to have respiratory distress and an unstable airway due to a laryngeal mass for which she underwent an emergent tracheostomy and is now admitted for further evaluation and cares.       Laryngeal/neck mass, biopsy showing squamous cell carcinoma, moderately to poorly differentiated   Unstable airway, S/P emergency tracheotomy for assistance in breathing  Tracheostomy in place  12/3/20 -- Underwent urgent tracheotomy with tracheostomy placement on 12/3 due to new finding of laryngeal mass causing unstable airway. Biopsies were taken during procedure.  - ENT placed all tracheostomy orders; for trach-specific questions contact ENT  - ENT consult for tracheostomy management  - NPO  - Speech consult, patient will need to pass swallow study before discharge; if fails would need a feeding tube     - Care Transitions consult to assist with obtaining necessary home equipment to manage a tracheostomy  12/4/2020 -- doing well so " far - see ENT note for details/plan - likely replacing trach on Monday with possible discharge that pm or Tues.    12/5/2020 -- ordering supplies as below per ENT recommendations.  Feeding tube placed yesterday as below.     12/6/2020 -- replacing electrolytes as below, stopping tube feeds tonight to be NPO for possible surgery tomorrow.   12/7/2020 -- Unable to do G or J tube per surgery as below - continue feeding tube for now, sutured by ENT today.  Trach switched out today.  Pathology confirms squamous cell cancer.    12/9/2020 -- plan for discharge to TCU today with trach and nasal feeding tube with tube feeds as per nurtirion recommendations/orders.      - Will need outpatient follow-up for cancer work-up - ENT scheduling follow-up with ENT at [facility], general oncology and radiation oncology.        Hypomagnesemia  12/6/2020 -- replacing, follow.    12/7/2020 -- normalized       hypokalemia  12/7/2020 -- on replacement.  Advancing tube feeds.    12/8/2020 -- normal this AM.       Aspiration on swallow evaluation with Purulent drainage on trach replacement 12/7 concerning for possible aspiration pneumonia   12/4/2020 -- failed all consistencies on video swallow evaluation -   12/5/2020 -- ENT placed feeding tube yesterday, nutrition started tube feeds today,  12/7/2020 -- options discussed with surgery, but they determined that with her ascites that G or J-tube placement would not be a good idea at this time.  ENT noted some purulent drainage on switching out trach today.  Started on bactrim by ENT which I think is reasonable.  No other symptoms of aspiration pneumonia.    12/9/2020 -- doing well, day 3/7 bactrim.       Habitual alcohol use  Alcoholic cirrhosis with ascites  12/3/20 -- Per chart review she has frequent alcohol use, although she says not daily, last drink 3-4 days ago. Denies prior history of withdrawal or seizures. Does appear to have prior paracentesis due to cirrhosis and ascites. LFTs normal  "on admission.   - No evidence of withdrawal on admission, so no CIWA indicated, but monitor for evidence and initiate CIWA as appropriate  12/8/2020 -- no issues while here.   confirmed with patient that ascites feel at long-term baseline for her, not increased like when she last needed a paracentesis \"a number of years ago\".        Personal history of tobacco use, presenting hazards to health  Quit smoking 2 months ago. Has not been using nicotine replacement.      Constipation   12/6/2020 -- prn medications ordered per protocol     12/9/2020 -- continue prn medications at TCU        Asymptomatic COVID screen negative.       Diet:   NPO     DVT Prophylaxis: lovenox     Palm Catheter: not present     Code Status: Full Code  - discussed with patient on admission               Discharge Instructions and Follow-Up:     Discharge diet: Orders Placed This Encounter      NPO for Medical/Clinical Reasons Except for: Meds, Ice Chips    Tube feeds as per nutrition recommendations on discharge.     Discharge activity: Activity as tolerated   Discharge follow-up: Follow-up with primary care provider at facility within 1 week  Follow-up with ENT at [facility], oncology and radiation oncology as being coordinated by Dr. Canales            Discharge Disposition:     Discharged to TCU      Attestation:  I have reviewed today's vital signs, notes, medications, labs and imaging.  Amount of time performed on this discharge summary: 50 minutes.    Hoang Boggs MD, MD       "

## 2020-12-09 NOTE — H&P
[facility] Transitional Care    History and Physical - Hospitalist Service       Date of Admission:  12/9/2020    Assessment & Plan   Karen Tee is a 64 year old female admitted on 12/9/2020 after recent tracheostomy placement. She has a PMHx significant for alcoholic cirrhosis, portal hypertension, chronic ascites and depression.   She was recently found with a large right-sided mass in the epiglottis, right aryepiglottic fold, and right arytenoid. S/P emergent tracheostomy on 12/3/20 by ENT. Biopsy showed squamous cell carcinoma, moderately to poorly differentiated. NG tube was placed and she was started on tube feeds. No PEG tube was placed due to hx of cirrhosis / ascites.   On 12/9/2020 she was transferred to TCU for ongoing rehabilitation and tracheostomy management education.   I evaluated the patient on arrival to TCU, she was alert, oriented and pleasant. No signs of respiratory distress, she was maintaining normal O 2 sat's on room air.      #Laryngeal/neck mass, biopsy showing squamous cell carcinoma, moderately to poorly differentiated   #Unstable airway, S/P emergency tracheotomy for assistance in breathing  #Tracheostomy in place  -Nursing trach care.   -No signs of infection around the trach, no erythema, purulent discharge or bleeding observed.   -ENT and Oncology follow-up as outpatient.   -Respiratory status appears stable on admission, she is maintaining normal O 2 sat's on room air.   -Continue monitoring HGB 11.4 today.   -No signs of infection, normal WBC 9.9 today.   -Albuterol as needed.     #Weakness and deconditioning   -PT / OT    #Malnutrition   #Enteral nutrition   -Nutritionist consult.   -Continue on enteral feeds.  -Continue monitoring renal function and lytes.      #Aspiration on swallow evaluation with Purulent drainage on trach replacement 12/7 concerning for possible aspiration pneumonia   -SLP consult.  -She was started on Bactrim at the OSH for treatment possible  aspiration pneumonia.   -Continue on Bactrim per NG tube day 3/7.   -She was afebrile on admission, didn't appear septic or toxic, normal WBC.       #Alcoholic cirrhosis with ascites  -Cirrhosis appears compensated at this moment.   -Mild ascites detected on physical examination.   -Per chart review she was still drinking previous to her admission to the hospital.   -No signs of alcohol withdrawal.   -Continue monitoring LFT's.        Diet:  NPO   DVT Prophylaxis: Ambulate every shift  Palm Catheter: not present  Code Status: Full Code    Patient is not taking antipsychotic medications.   She doesn't need a dose of pneumococcal vaccination at this time.            Disposition Plan   Expected discharge:TBD  Entered: Skyler Horotn MD 12/09/2020, 2:50 PM     The patient's care was discussed with the Patient.    Skyler Horton MD  [facility] Transitional Care  Contact information available via [facility] Paging/Directory      ______________________________________________________________________    Chief Complaint   Weakness   S/P Tracheostomy     History is obtained from the patient    History of Present Illness   Karen Tee is a 64 year old female admitted on 12/9/2020 after recent tracheostomy placement. She has a PMHx significant for alcoholic cirrhosis, portal hypertension, chronic ascites and depression.   She was recently found with a large right-sided mass in the epiglottis, right aryepiglottic fold, and right arytenoid. S/P emergent tracheostomy on 12/3/20 by ENT. Biopsy showed squamous cell carcinoma, moderately to poorly differentiated. NG tube was placed and she was started on tube feeds. No PEG tube was placed due to hx of cirrhosis / ascites.   On 12/9/2020 she was transferred to TCU for ongoing rehabilitation and tracheostomy management education.   I evaluated the patient on arrival to TCU, she was alert, oriented and pleasant. No signs of respiratory distress, she was maintaining  normal O 2 sat's on room air.    No chest pain, palpitations, shortness of breath, dizziness, fever, chills, diaphoresis or abdominal pain. She was able to clear secretions from her trach.        Review of Systems    The 10 point Review of Systems is negative other than noted in the HPI or here. Other ROS negative.     Past Medical History    I have reviewed this patient's medical history and updated it with pertinent information if needed.   Past Medical History:   Diagnosis Date     Alcohol dependence (H)      Chronic depressive personality disorder      Cirrhoses, liver     diagnosed 7/10     Tobacco dependence        Past Surgical History   I have reviewed this patient's surgical history and updated it with pertinent information if needed.  Past Surgical History:   Procedure Laterality Date     HC EXCISION BREAST LESION, OPEN >=1       TRACHEOSTOMY N/A 12/3/2020    Procedure: AWAKE FIBEROPTIC INTUBATION, OPEN TRACHEOSTOMY, DIRECT LARYNGOSCOPY WITH BIOPSY;  Surgeon: Marshall Canales MD;  Location: [facility] OR       Social History   I have reviewed this patient's social history and updated it with pertinent information if needed.  Social History     Tobacco Use     Smoking status: Former Smoker     Packs/day: 0.50     Types: Cigarettes     Quit date: 10/17/2020     Years since quittin.1     Smokeless tobacco: Never Used     Tobacco comment: has not smoked since 10/17/20   Substance Use Topics     Alcohol use: No     Comment: pt. has not had any alcohol since hospitalization 2018     Drug use: No     Comment: hx of alcohol abuse.       Family History   I have reviewed this patient's family history and updated it with pertinent information if needed.  Family History   Problem Relation Age of Onset     Hypertension Mother      Depression Sister         and Anxiety.     Psychotic Disorder Sister         severe anxiety     Neurologic Disorder Brother          from brain tumor.       Prior to Admission Medications    Prior to Admission Medications   Prescriptions Last Dose Informant Patient Reported? Taking?   albuterol (PROAIR HFA/PROVENTIL HFA/VENTOLIN HFA) 108 (90 Base) MCG/ACT inhaler   No No   Sig: Inhale 6 puffs into the lungs every 6 hours as needed for shortness of breath / dyspnea or wheezing   albuterol (PROAIR HFA/PROVENTIL HFA/VENTOLIN HFA) 108 (90 Base) MCG/ACT inhaler   No No   Sig: Inhale 2 puffs into the lungs every 4 hours as needed for shortness of breath / dyspnea or wheezing   bisacodyl (DULCOLAX) 10 MG suppository   No No   Sig: Place 1 suppository (10 mg) rectally daily as needed for constipation   magnesium hydroxide (MILK OF MAGNESIA) 400 MG/5ML suspension   No No   Sig: Take 30 mLs by mouth daily as needed for constipation   multivitamin w/minerals (THERA-VIT-M) tablet   Yes No   Sig: Take 1 tablet by mouth daily   oxyCODONE (ROXICODONE) 5 MG/5ML solution   No No   Sig: Take 5 mLs (5 mg) by mouth every 4 hours as needed for moderate to severe pain   sulfamethoxazole-trimethoprim (BACTRIM/SEPTRA) 8 mg/mL suspension   No No   Si mLs (160 mg) by Per Feeding Tube route 2 times daily for 4 days      Facility-Administered Medications: None     Allergies   Allergies   Allergen Reactions     Nka [No Known Allergies]        Physical Exam   Vital Signs: Temp: 96.6  F (35.9  C) Temp src: Oral BP: 129/76 Pulse: 99     SpO2: 96 % O2 Device: None (Room air)    Weight: 153 lbs 3.2 oz    General Appearance: Alert, pleasant, no acute distress, room air.   Eyes: Pupils equal and reactive to light.   HEENT: + trach in place, no erythema, bleeding or discharge around the trach, no stridor, oral mucosa moist, NG tube in place.   Respiratory: Normal respiratory effort, distant breath sounds at bases, no crackles or wheezing.   Cardiovascular: RRR, no murmur, no peripheral edema.   GI: Obese, soft, + BS, no tenderness to palpation   Lymph/Hematologic: No lymphadenopathy.   Genitourinary: Deferred.   Skin: No rash.    Musculoskeletal: No swollen joints.   Neurologic: Alert, oriented x 3, no focal deficits.   Psychiatric: Mood appears stable.     Data   Data reviewed today: I reviewed all medications, new labs and imaging results over the last 24 hours. I personally reviewed no images or EKG's today.    Recent Labs   Lab 12/09/20 2039 12/09/20 0517 12/08/20 0535 12/04/20 0540 12/04/20 0540   WBC 12.1* 9.9 9.5   < >  --    HGB 9.3* 11.4* 11.4*   < >  --    * 96 97   < >  --     236 207   < >  --    INR  --   --   --   --  1.14    135 137   < >  --    POTASSIUM 5.4* 3.9 3.8   < >  --    CHLORIDE 106 101 101   < >  --    CO2 20 30 30   < >  --    BUN 10 9 7   < >  --    CR 0.64 0.48* 0.46*   < >  --    ANIONGAP 14 4 6   < >  --    LYN 11.8* 9.1 8.9   < >  --    * 122* 117*   < >  --    ALBUMIN 1.9* 2.6* 2.5*   < >  --    PROTTOTAL 5.5* 7.0 6.6*   < >  --    BILITOTAL 0.2 0.4 0.4   < >  --    ALKPHOS 98 118 116   < >  --    ALT 16 15 14   < >  --    AST 15 19 20   < >  --     < > = values in this interval not displayed.     Most Recent 3 CBC's:  Recent Labs   Lab Test 12/09/20 2039 12/09/20 0517 12/08/20 0535   WBC 12.1* 9.9 9.5   HGB 9.3* 11.4* 11.4*   * 96 97    236 207

## 2020-12-09 NOTE — PLAN OF CARE
Problem: Adult Inpatient Plan of Care  Goal: Plan of Care Review  12/9/2020 0731 by Jaye Soto RN  Outcome: Improving  12/9/2020 0731 by Jaye Soto RN  Outcome: Improving  Goal: Patient-Specific Goal (Individualized)  12/9/2020 0731 by Jaye Soto RN  Outcome: Improving  12/9/2020 0731 by Jaye Soot RN  Outcome: Improving  Goal: Absence of Hospital-Acquired Illness or Injury  12/9/2020 0731 by Jaye Soto RN  Outcome: Improving  12/9/2020 0731 by Jaye Soto RN  Outcome: Improving  Intervention: Prevent Skin Injury  Recent Flowsheet Documentation  Taken 12/9/2020 0700 by Jaye Soto RN  Body Position: position changed independently  Goal: Optimal Comfort and Wellbeing  12/9/2020 0731 by Jaye Soto RN  Outcome: Improving  12/9/2020 0731 by Jaye Soto RN  Outcome: Improving  Goal: Readiness for Transition of Care  12/9/2020 0731 by Jaye Soto RN  Outcome: Improving  12/9/2020 0731 by Jaye Soto RN  Outcome: Improving     Problem: Discharge Planning  Goal: Discharge Planning (Adult, OB, Behavioral, Peds)  12/9/2020 0731 by Jaye Soto RN  Outcome: Improving  12/9/2020 0731 by Jaye Soto RN  Outcome: Improving     Problem: Communication Impairment (Artificial Airway)  Goal: Effective Communication  12/9/2020 0731 by Jaye Soto RN  Outcome: Improving  12/9/2020 0731 by Jaye Soto RN  Outcome: Improving     Problem: Patient Goal: Social Work Focus  Goal: 1. Patient Goal: Care Coordination / Social Work  12/9/2020 0731 by Jaye Soto RN  Outcome: Improving  12/9/2020 0731 by Jaye Soto RN  Outcome: Improving  Goal: 2. Patient Goal: Care Coordination / Social Work Focus  12/9/2020 0731 by Jaye Soto RN  Outcome: Improving  12/9/2020 0731 by Jaye Soto RN  Outcome: Improving  Goal: 3. Patient Goal: Care Coordination / Social Work Focus  12/9/2020 0731 by Jaye Soto RN  Outcome: Improving  12/9/2020 0731 by  Jaye Soto, RN  Outcome: Improving

## 2020-12-09 NOTE — PROGRESS NOTES
"Pt alert and oriented. VSS. Pt self suctioning and tolerating well. Was called into pt room because pt, \"felt like I can't breathe\". Respiratory helped deep suction and lavage trach and added humidity. This provided relief for pt. Pt did trach cares on own with RN supervision. PRN Oxycodone given for trach pain/soreness. Ice chips and small sips with meds initiated and tolerating well. Up to bathroom independently. Pt eager to discharge.    Tran Garcia RN    "

## 2020-12-10 NOTE — PROGRESS NOTES
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     On my arrival, patient with upper airway transmitted sounds  Patient does not appear to be in respiratory distress. Tracheotomy tube  Which was out by half an inch was pushed back in to sit snugly  /76 (BP Location: Right arm)   Pulse 99   Temp 96.6  F (35.9  C) (Oral)   Resp 20   Ht 1.6 m (5' 3\")   Wt 69.5 kg (153 lb 3.2 oz)   LMP 05/19/2007   SpO2 96%   BMI 27.14 kg/m     Following this, patient said she is able o breathe comfortably.  However, they are still unable to suction. The suction catheter stops at around 5 cms, which would go twice the length this evening for suctioning    RN tried to reposition the tracheostomy by securing the sides and make it \"appear straight\", as it was projecting to one side. At this time, patient was suddenly unable to breathe and almost tried to pull the tracheostomy out in that distress phase. This quickly resolved as she settled back to her bed from sitting position   Still unable to advance suction catheter  Given the unstable airway, the trache being our only source, it was felt that it was prudent to have this examined  Patient was originally admitted form [facility], but we are unable to get patient there  Discussed with Staff ENT doc on [facility] and the events this evening. She has kindly agreed to evaluate patient in the ER  ( due to the lack of availability of equipment on the [facility] [place] [place] and unfamiliarity)  Discussed with [facility]. They may " struggle to find an ED bed and  are contemplating dievert, but  I discussed that in this situation, we have no other option, but to have this evaluated emergently      We plan on the patient coming back to TCU after the tracheostomy is fixed  Readmit orders to TCU are signes and held        Dr TALYA Corona MD, [facility]  Hospitalist ( Internal medicine)  Pager: [phone]

## 2020-12-10 NOTE — PROGRESS NOTES
"This RT was called to place the patient on humidity. Upon arriving, I observed that the patients' trach was not flush to her neck. The patients' RN came in and asked me to assess it. I asked the patient did that fell normal for her and I attempted to push the trach back into place. She said that the trach ties felt tight. She then removed her passy brayden valve. I called a second RT to take a look at it and we had her lay down and extend her neck. I attempted to push it back again, still meeting resistance and receiving a grimace from the patient. I asked her did it hurt and she said yes. I checked her sats. They were 96%. I am unfamiliar with the patient, and with this being a fairly new trach, I felt that the attending should take a look at it because I did not want to take it out and attempt to insert another in case of swelling without the attending physician being aware of the situation. The patient is currently stable, breathing at around 20 but needs to be examined asap by a physician that is willing to oversee any changes with the trach due to concerns of swelling. When the new trach is placed, her trach ties need to be adequately secured to withstand frequent manipulation of passy brayden valve by the patient.     Respiratory will continue to follow.  Katja \"April\" Dilan.   "

## 2020-12-10 NOTE — DISCHARGE SUMMARY
"[facility]  Hospitalist Discharge Summary      Date of Admission:  12/9/2020  Date of Discharge:  12/9/2020  8:29 PM  Discharging Provider: Skyler Horton MD      Discharge Diagnoses   S/P tracheostomy  Cardiac arrest      Follow-ups Needed After Discharge       Unresulted Labs Ordered in the Past 30 Days of this Admission     No orders found for last 31 day(s).          Discharge Disposition   Patient was transferred to the ER    Hospital Course   Initial HPI: \"Karen Tee is a 64 year old female admitted on 12/9/2020 after recent tracheostomy placement. She has a PMHx significant for alcoholic cirrhosis, portal hypertension, chronic ascites and depression.   She was recently found with a large right-sided mass in the epiglottis, right aryepiglottic fold, and right arytenoid. S/P emergent tracheostomy on 12/3/20 by ENT. Biopsy showed squamous cell carcinoma, moderately to poorly differentiated. NG tube was placed and she was started on tube feeds. No PEG tube was placed due to hx of cirrhosis / ascites.   On 12/9/2020 she was transferred to TCU for ongoing rehabilitation and tracheostomy management education.   I evaluated the patient on arrival to TCU, she was alert, oriented and pleasant. No signs of respiratory distress, she was maintaining normal O 2 sat's on room air.    No chest pain, palpitations, shortness of breath, dizziness, fever, chills, diaphoresis or abdominal pain. She was able to clear secretions from her trach\".    Patient was stable on admission. Per nursing staff documentation she started with respiratory distress after she removed the speaking valve, trach was out about 2 - 3 cm, RT couldn't reposition trach. Transfer to the [facility] for ENT was requested. Patient was found with cardiac arrest and code blue was called (please see cross coverage / rapid response notes). She was transferred to the ER.        Consultations This Hospital Stay   NUTRITION SERVICES " ADULT IP CONSULT  PHARMACY IP CONSULT  SPEECH LANGUAGE PATH ADULT IP CONSULT  OCCUPATIONAL THERAPY ADULT IP CONSULT  PHYSICAL THERAPY ADULT IP CONSULT  NUTRITION SERVICES ADULT IP CONSULT  PHARMACY IP CONSULT  SPEECH LANGUAGE PATH ADULT IP CONSULT  OCCUPATIONAL THERAPY ADULT IP CONSULT  PHYSICAL THERAPY ADULT IP CONSULT    Code Status   Full code     Time Spent on this Encounter   I, Skyler Horton MD, personally saw the patient today and spent less than or equal to 30 minutes discharging this patient.       Skyler Horton MD  [facility] TRANSITIONAL [facility]ARE [address] [address]  Phone: [phone]  ______________________________________________________________________    Physical Exam   Vital Signs: Temp: 96.6  F (35.9  C) Temp src: Oral BP: 129/76 Pulse: 99   Resp: 20 SpO2: 96 % O2 Device: None (Room air)    Weight: 153 lbs 3.2 oz    Physical examination on admission to TCU:     General Appearance: Alert, pleasant, no acute distress, room air.   Eyes: Pupils equal and reactive to light.   HEENT: + trach in place, no erythema, bleeding or discharge around the trach, no stridor, oral mucosa moist, NG tube in place.   Respiratory: Normal respiratory effort, distant breath sounds at bases, no crackles or wheezing.   Cardiovascular: RRR, no murmur, no peripheral edema.   GI: Obese, soft, + BS, no tenderness to palpation   Lymph/Hematologic: No lymphadenopathy.   Genitourinary: Deferred.   Skin: No rash.   Musculoskeletal: No swollen joints.   Neurologic: Alert, oriented x 3, no focal deficits.   Psychiatric: Mood appears stable.           Primary Care Physician   Tyler Khalil    Discharge Orders   No discharge procedures on file.    Discharge Medications   Discharge Medication List as of 12/9/2020  8:29 PM      CONTINUE these medications which have NOT CHANGED    Details   !! albuterol (PROAIR HFA/PROVENTIL HFA/VENTOLIN HFA) 108 (90 Base) MCG/ACT inhaler  Inhale 2 puffs into the lungs every 4 hours as needed for shortness of breath / dyspnea or wheezing, Disp-18 g, R-3, E-PrescribePharmacy may dispense brand covered by insurance (Proair, or proventil or ventolin or generic albuterol inhaler)      !! albuterol (PROAIR HFA/PROVENTIL HFA/VENTOLIN HFA) 108 (90 Base) MCG/ACT inhaler Inhale 6 puffs into the lungs every 6 hours as needed for shortness of breath / dyspnea or wheezing, Disp-1 Inhaler, R-0, E-PrescribePharmacy may dispense brand covered by insurance (Proair, or proventil or ventolin or generic albuterol inhaler)      bisacodyl (DULCOLAX) 10 MG suppository Place 1 suppository (10 mg) rectally daily as needed for constipation, Transitional      magnesium hydroxide (MILK OF MAGNESIA) 400 MG/5ML suspension Take 30 mLs by mouth daily as needed for constipation, Transitional      multivitamin w/minerals (THERA-VIT-M) tablet Take 1 tablet by mouth daily, Historical      oxyCODONE (ROXICODONE) 5 MG/5ML solution Take 5 mLs (5 mg) by mouth every 4 hours as needed for moderate to severe pain, Disp-160 mL, R-0, Local Print      sulfamethoxazole-trimethoprim (BACTRIM/SEPTRA) 8 mg/mL suspension 20 mLs (160 mg) by Per Feeding Tube route 2 times daily for 4 days, TransitionalDose based on TMP component.       !! - Potential duplicate medications found. Please discuss with provider.        Allergies   Allergies   Allergen Reactions     Nka [No Known Allergies]

## 2020-12-10 NOTE — PLAN OF CARE
Dr Corona came to assess the patient, sating at 96%, pt not in respiratory distress. Tracheostomy tube which was out approximally by 0.5inch pushed back in to sit snugly, pt stated breathing comfortably. However, staff RN unable to do deep suctioning. Ambulance called to transfer pt to [place] ED to be examined by ENT & returned back to TCU. As soon as Ambulance arrived  & as she was being in a sitting position to be moved to stretcher, pt went in to respiratory distress. Code blue called. CPR started & switched to [facility] used by emergency.

## 2020-12-10 NOTE — CODE/RAPID RESPONSE
I went back to discuss with patient to say that she was being transferred to [facility]. She was sleeping comfortably, but was startled when I woke her up  She understood and went back to sleep. Sitter was at bedside    Ambulance crew arrived almost immediatly and I went in with the crew to state that she will be going to [facility], where the ENT surgeon would be evaluating her  After some time, as she was being sat up to be moved to the stretcher, it appears that she went into respiratory distress. RRT was called  I went back into the room to see that patient was blue in her face. Asked that code blue be called with stat anesthesia/ airway  Soon, CPR was begun due to unresponsiveness and lack of pulse. Bedside RN tried maximum suctioning, which was not working.  Hence the tracheostomy was pulled out to assist with suctioning  Another tracheostomy tube was attempted as the the airway team arrived and took over.  I  Stepped out to inform the son as the team continued with CPR. A [facility] was used. 4 dose of Epi was given   Airway team was able to place a 6-0 ET tube through the tracheostomy and wheeled the patient out as the [facility] continued to operate    Called the son twice. Once during the CPR and once after the patient was transferred out.  ENT surgeon updated       Dr TLAYA Corona MD, [facility]  Hospitalist ( Internal medicine)  Pager: [phone]

## 2020-12-10 NOTE — ANESTHESIA PROCEDURE NOTES
Airway   Date/Time: 12/9/2020 8:00 PM   Patient location during procedure: Floor (TCU Rehab 4th floor)    Staff -   CRNA: Reginald Bañuelos APRN CRNA  Performed By: CRNA    Consent for Airway   Urgency: emergentConsent: The procedure was performed in an emergent situation.    Report Obtained from Primary Care Team  History regarding most recent potassium obtained: Yes  History regarding presence/absence of renal failure obtained:Yes  History regarding stroke/CVA obtained:Yes  History regarding presence/absence of NM disorder:Yes    Indications and Patient Condition  Indications for airway management: respiratory insufficiency and hemodynamic instability  Mallampati: tracheostomy 6.0 venkatesh.Induction type:other (comments) (No induction. Patient unresponsive)Mask assessment prior to intubation: Floor RN hand ventilating via tracheostomy.    Final Airway Details  Final airway type: endotracheal airway  Successful airway:ETT - single, Oral and Other (comment) (ETTube inserted into tracheostomy.  Air placed 5-10cc air placed in cuff)  Endotracheal Airway Details   ETT size (mm): 6.0  Cuffed: yes  Successful intubation technique: blind  Cormack & Lehane grading: via tracheostomy.  Measured from: stoma  Secured with: cloth tape  Bite block used: None    Post intubation assessment   ETT secured  Placement verified by: capnometry and equal breath sounds   Number of attempts at approach: 1  Number of other approaches attempted: 0  Secured with:cloth tape  Ease of procedure: easy  Dentition: Intact and Unchanged

## 2020-12-10 NOTE — PROGRESS NOTES
RT responded to code blue.  When this RT entered room, patient was being manually ventilated by other RT via 6.0 ETT through stoma site.  [facility] was performing CPR.  Multiple epi doses were given.  RT attempted to secure ETT with anesthesia team.  Patient was transferred to [place].      Kellie Madison, RRT-NPS

## 2020-12-10 NOTE — ED PROVIDER NOTES
ED Provider Note  [facility]      History     Chief Complaint   Patient presents with     Cardiac Arrest     The history is provided by the patient and medical records.     Karen Tee is a 64 year old female with a past medical history significant for squamous cell laryngeal cancer, liver cirrhosis, alcohol dependence, hypertension, hyperlipidemia, tobacco use disorder who presents to the ED for a cardiac arrest.  On 12/3/2020, the patient presented to the ENT clinic for evaluation of ear pain and difficulty breathing over the last 1.5 months.  She was later found to have respiratory distress and unstable airway.  She underwent an emergency tracheostomy.  She was admitted for further evaluation and care from 12/3/2020 to 12/9/2020.  ENT later found a tracheal mass that was later confirmed to be a squamous cell cancer.  The patient had a new trach placed on 12/7/2020.  Today, the patient was going to be discharged from her TCU.  Her tracheostomy tube was noted to be backed out by half an inch.  Multiple attempts were made to reposition the tube with some difficulty.  At one point while a nurse was trying to straighten out the tube as it was noted to be projecting to one side, the patient developed sudden respiratory distress and almost tried to pull the tracheostomy out.  They were able to resolve her breathing issues by settling her back into bed from a sitting position.  However they had difficulty advancing a suction catheter and felt the airway was unstable.  For speaking with ENT staff, they decided to transfer the patient to the [place] emergency department for for evaluation by ENT. The ambulance crew arrived to transport patient, and during transfer to the EMS stretcher, the patient developed recurrent respiratory distress.  An RRT was called.  Patient was noted to be blue in the face at which time a CODE BLUE was called and anesthesia arrived.  CPR was begun for  unresponsiveness without a pulse.  They attempted suctioning which was not working so the tracheostomy was pulled out to suction directly through the tracheostomy stoma.  Attempted to replace with a different tracheostomy tube without success.  A [employer] device was placed for ongoing CPR and the patient was given 4 total doses of epinephrine.  The airway team placed a 6-0 endotracheal tube to the tracheostomy site during the code.  Per the medics, they were not confident in the placement of the endotracheal tube as the patient never had a good waveform on capnography.  They stated the patient received a total of 6 rounds of epi and 2 amps of bicarb prior to arrival to our ED. the patient arrived with CPR ongoing with a [employer] device in place.  She was unresponsive and cyanotic receiving bag ventilations and endotracheal tube projecting from her tracheostomy incision. an 18-gauge IV was in place in the left wrist.      Past Medical History  Past Medical History:   Diagnosis Date     Alcohol dependence (H)      Chronic depressive personality disorder      Cirrhoses, liver     diagnosed 7/10     Tobacco dependence      Past Surgical History:   Procedure Laterality Date     HC EXCISION BREAST LESION, OPEN >=1       TRACHEOSTOMY N/A 12/3/2020    Procedure: AWAKE FIBEROPTIC INTUBATION, OPEN TRACHEOSTOMY, DIRECT LARYNGOSCOPY WITH BIOPSY;  Surgeon: Marshall Canales MD;  Location: [facility] OR          albuterol (PROAIR HFA/PROVENTIL HFA/VENTOLIN HFA) 108 (90 Base) MCG/ACT inhaler       albuterol (PROAIR HFA/PROVENTIL HFA/VENTOLIN HFA) 108 (90 Base) MCG/ACT inhaler       bisacodyl (DULCOLAX) 10 MG suppository       magnesium hydroxide (MILK OF MAGNESIA) 400 MG/5ML suspension       multivitamin w/minerals (THERA-VIT-M) tablet       oxyCODONE (ROXICODONE) 5 MG/5ML solution       sulfamethoxazole-trimethoprim (BACTRIM/SEPTRA) 8 mg/mL suspension      Allergies   Allergen Reactions     Nka [No Known Allergies]      Family History  Family  History   Problem Relation Age of Onset     Hypertension Mother      Depression Sister         and Anxiety.     Psychotic Disorder Sister         severe anxiety     Neurologic Disorder Brother          from brain tumor.     Social History   Social History     Tobacco Use     Smoking status: Former Smoker     Packs/day: 0.50     Types: Cigarettes     Quit date: 10/17/2020     Years since quittin.1     Smokeless tobacco: Never Used     Tobacco comment: has not smoked since 10/17/20   Substance Use Topics     Alcohol use: No     Comment: pt. has not had any alcohol since hospitalization 2018     Drug use: No     Comment: hx of alcohol abuse.      Past medical history, past surgical history, medications, allergies, family history, and social history were reviewed with the patient. No additional pertinent items.       Review of Systems   Unable to perform ROS: Acuity of condition   Respiratory:        Positive for respiratory distress.     A complete review of systems was performed with pertinent positives and negatives noted in the HPI, and all other systems negative.    Physical Exam   BP: (!) 94/20  Pulse: (!) 0  Physical Exam  Vitals signs and nursing note reviewed.   Constitutional:       General: She is in acute distress.      Appearance: She is well-developed. She is obese. She is toxic-appearing. She is not diaphoretic.   HENT:      Head: Atraumatic.      Comments: Feeding tube in nose     Mouth/Throat:      Mouth: Mucous membranes are dry.   Eyes:      General: No scleral icterus.     Conjunctiva/sclera: Conjunctivae normal.   Neck:      Musculoskeletal: No crepitus.      Trachea: Tracheostomy present.        Comments: Short thick neck with fresh transverse laceration across trachea above sternal notch. No active bleeding. 6-0 ETT inserted in tracheostomy opening in anterior neck. Steam in tube. +end tidal waveform  Cardiovascular:      Comments: DAVINA compressions ongoing  Pulmonary:      Effort:  Pulmonary effort is normal. No respiratory distress.      Breath sounds: No stridor.   Abdominal:      General: Abdomen is protuberant. There is distension.      Palpations: Abdomen is soft.   Musculoskeletal:         General: No deformity.   Skin:     General: Skin is cool and dry.      Coloration: Skin is ashen, cyanotic and mottled. Skin is not jaundiced.   Neurological:      Mental Status: She is unresponsive.      GCS: GCS eye subscore is 1. GCS verbal subscore is 1. GCS motor subscore is 1.      Motor: Abnormal muscle tone present. No tremor or seizure activity.   Psychiatric:      Comments: Unable to assess         ED Course      Procedures   8:22 PM  The patient was seen and examined by Gosia Kaiser MD in Room ED03.              Critical Care Addendum    My initial assessment, based on my review of prehospital provider report, review of nursing observations, review of vital signs, focused history, physical exam, review of cardiac rhythm monitor and discussion with ENT, established that Karen Tee has cardiac arrest and respiratory failure, which requires immediate intervention, and therefore she is critically ill.     After the initial assessment, the care team initiated multiple lab tests, initiated IV fluid administration, initiated medication therapy with ACLS meds, initiated intensive non-invasive respiratory support, performed advanced airway management and consulted with anesthesiology and ENT to provide stabilization care. Due to the critical nature of this patient, I reassessed vital signs, physical exam, review of cardiac rhythm monitor, mental status and respiratory status multiple times prior to her disposition.     Time also spent performing documentation, discussion with family to obtain medical information for decision making, reviewing test results and discussion with consultants.     Critical care time (excluding teaching time and procedures): 54 minutes.                Results for  orders placed or performed during the hospital encounter of 12/09/20   CBC with platelets differential     Status: Abnormal   Result Value Ref Range    WBC 12.1 (H) 4.0 - 11.0 10e9/L    RBC Count 2.91 (L) 3.8 - 5.2 10e12/L    Hemoglobin 9.3 (L) 11.7 - 15.7 g/dL    Hematocrit 32.2 (L) 35.0 - 47.0 %     (H) 78 - 100 fl    MCH 32.0 26.5 - 33.0 pg    MCHC 28.9 (L) 31.5 - 36.5 g/dL    RDW 15.3 (H) 10.0 - 15.0 %    Platelet Count 224 150 - 450 10e9/L    Diff Method Manual Method     % Neutrophils 50.0 %    % Lymphocytes 34.0 %    % Monocytes 12.0 %    % Eosinophils 2.0 %    % Myelocytes 2.0 %    Absolute Neutrophil 6.1 1.6 - 8.3 10e9/L    Absolute Lymphocytes 4.1 0.8 - 5.3 10e9/L    Absolute Monocytes 1.5 (H) 0.0 - 1.3 10e9/L    Absolute Eosinophils 0.2 0.0 - 0.7 10e9/L    Absolute Myelocytes 0.2 (H) 0 10e9/L    Polychromasia Moderate Increase    Comprehensive metabolic panel     Status: Abnormal   Result Value Ref Range    Sodium 141 133 - 144 mmol/L    Potassium 5.4 (H) 3.4 - 5.3 mmol/L    Chloride 106 94 - 109 mmol/L    Carbon Dioxide 20 20 - 32 mmol/L    Anion Gap 14 3 - 14 mmol/L    Glucose 185 (H) 70 - 99 mg/dL    Urea Nitrogen 10 7 - 30 mg/dL    Creatinine 0.64 0.52 - 1.04 mg/dL    GFR Estimate >90 >60 mL/min/[1.73_m2]    GFR Estimate If Black >90 >60 mL/min/[1.73_m2]    Calcium 11.8 (H) 8.5 - 10.1 mg/dL    Bilirubin Total 0.2 0.2 - 1.3 mg/dL    Albumin 1.9 (L) 3.4 - 5.0 g/dL    Protein Total 5.5 (L) 6.8 - 8.8 g/dL    Alkaline Phosphatase 98 40 - 150 U/L    ALT 16 0 - 50 U/L    AST 15 0 - 45 U/L   Results for orders placed or performed during the hospital encounter of 12/09/20   Hemoglobin A1c     Status: None   Result Value Ref Range    Hemoglobin A1C 5.0 0 - 5.6 %   Glucose by meter     Status: Abnormal   Result Value Ref Range    Glucose 104 (H) 70 - 99 mg/dL   Results for orders placed or performed in visit on 12/09/20   Airway     Status: None    Narrative    Reginald Bañuelos, APRN CRNA      12/9/2020  8:31 PM  Airway   Date/Time: 12/9/2020 8:00 PM   Patient location during procedure: Floor (TCU Rehab 4th floor)    Staff -   CRNA: Reginald Bañuelos APRN CRNA  Performed By: CRNA    Consent for Airway   Urgency: emergentConsent: The procedure was performed in an emergent   situation.    Report Obtained from Primary Care Team  History regarding most recent potassium obtained: Yes  History regarding presence/absence of renal failure obtained:Yes  History regarding stroke/CVA obtained:Yes  History regarding presence/absence of NM disorder:Yes    Indications and Patient Condition  Indications for airway management: respiratory insufficiency and   hemodynamic instability  Mallampati: tracheostomy 6.0 venkatesh.Induction type:other (comments) (No   induction. Patient unresponsive)Mask assessment prior to intubation: Floor   RN hand ventilating via tracheostomy.    Final Airway Details  Final airway type: endotracheal airway  Successful airway:ETT - single, Oral and Other (comment) (ETTube inserted   into tracheostomy.  Air placed 5-10cc air placed in cuff)  Endotracheal Airway Details   ETT size (mm): 6.0  Cuffed: yes  Successful intubation technique: blind  Cormack & Lehane grading: via tracheostomy.  Measured from: stoma  Secured with: cloth tape  Bite block used: None    Post intubation assessment   ETT secured  Placement verified by: capnometry and equal breath sounds   Number of attempts at approach: 1  Number of other approaches attempted: 0  Secured with:cloth tape  Ease of procedure: easy  Dentition: Intact and Unchanged   Results for orders placed or performed during the hospital encounter of 12/03/20   XR Video Swallow with SLP or OT     Status: None    Narrative    XR VIDEO SWALLOW WITH SLP OR OT 12/4/2020 3:49 PM    HISTORY: Tracheostomy. Evaluate swallowing function.    FLUORO TIME:  2.0 minutes.    PROCEDURE: The patient's swallowing mechanism is assessed under  fluoroscopy in conjunction with a  speech pathologist. [place] and  pudding type consistencies are utilized. 5 fluoroscopic sequences are  obtained.    FINDINGS: The epiglottis has a truncated appearance. There is poor  downward movement and poor laryngeal elevation when the swallow is  triggered. Penetration is seen during the swallow to the level of the  vocal cords. There is no spontaneous cough reflex. Later, penetrated  contents can be seen extending below the vocal cords, consistent with  aspiration.      Impression    IMPRESSION: There is penetration and subsequent aspiration of ingested  contents, as described above.    GREG WALLIS MD   Abdomen 1 View Port     Status: None    Narrative    ABDOMEN ONE VIEW PORTABLE  12/4/2020 5:51 PM     HISTORY: Feeding tube placement    COMPARISON: None.      Impression    IMPRESSION: Tip of the feeding tube is projected over the proximal  duodenum. Nonobstructive bowel gas pattern.    GILBERT FRANCO MD   Surgical pathology exam     Status: None   Result Value Ref Range    Copath Report       Patient Name: KAR RINCON  MR#: [id number]  Specimen #: [id number]  Collected: 12/3/2020  Received: 12/4/2020  Reported: 12/7/2020 12:41  Ordering Phy(s): COLLEEN MORALES    For improved result formatting, select 'View Enhanced Report Format' under   Linked Documents section.    SPECIMEN(S):  Supra glottic mass    FINAL DIAGNOSIS:  Supraglottic mass, biopsy:  - Squamous cell carcinoma, moderately to poorly differentiated, focally   keratinizing, invasive, p16 negative.    Electronically signed out by:    Baldev Dickerson M.D., PhD    CLINICAL HISTORY:  64 year old female.  Imaging showed large necrotic neoplasm centered   within the supraglottic larynx with  contiguous involvement of the epiglottis, base of tongue and floor of   mouth, aryepiglottic folds and inferior  tonsillar pillars. Large bilateral conglomerate and necrotic lymph node   masses with extracapsular extension  and invasion of adjacent  "structures    GROSS:  A single specimen container with formalin is received labeled wit h the   patient's name, date of birth, and  medical record number. Information on the requisition slip, container, and   associated labels is confirmed.    The specimen is designated \"supraglottic mass\" consisting of a 1.5 cm   aggregate of tan-pink irregular material  admixed with hemorrhage.  The fragments are filtered over tissue paper,   are wrapped and are entirely submitted  in one cassette. (Dictated by: Pao Davis 12/4/2020 03:43 PM)    MICROSCOPIC:  Microscopic examination is performed.  The immunohistochemical stain for   p16 was performed and reviewed.  Controls reacted appropriately.    The technical component of this testing was completed at the [facility], with the professional component performed   at the [facility], [address] [place] [phone] ([phone])    CPT Codes:  A: 42702-LJ6, 81108-GIO    COLLECTION SITE:  Client: [facility]  Location: [facility]     Asymptomatic COVID-19 Virus (Coronavirus) by PCR     Status: None    Specimen: Nasopharyngeal   Result Value Ref Range    COVID-19 Virus PCR to [facility] - Source Nasopharyngeal     COVID-19 Virus PCR to [facility] - Result       Test received-See reflex to IDDL test SARS CoV2 (COVID-19) Virus RT-PCR   CBC with platelets differential     Status: Abnormal   Result Value Ref Range    WBC 9.3 4.0 - 11.0 10e9/L    RBC Count 3.73 (L) 3.8 - 5.2 10e12/L    Hemoglobin 12.1 11.7 - 15.7 g/dL    Hematocrit 35.5 35.0 - 47.0 %    MCV 95 78 - 100 fl    MCH 32.4 26.5 - 33.0 pg    MCHC 34.1 31.5 - 36.5 g/dL    RDW 15.1 (H) 10.0 - 15.0 %    Platelet Count 233 150 - 450 10e9/L    Diff Method Automated Method     % Neutrophils 90.9 %    % Lymphocytes 7.3 %    % Monocytes 1.0 %    % Eosinophils 0.0 %    % Basophils 0.5 %    % " Immature Granulocytes 0.3 %    Nucleated RBCs 0 0 /100    Absolute Neutrophil 8.5 (H) 1.6 - 8.3 10e9/L    Absolute Lymphocytes 0.7 (L) 0.8 - 5.3 10e9/L    Absolute Monocytes 0.1 0.0 - 1.3 10e9/L    Absolute Eosinophils 0.0 0.0 - 0.7 10e9/L    Absolute Basophils 0.1 0.0 - 0.2 10e9/L    Abs Immature Granulocytes 0.0 0 - 0.4 10e9/L    Absolute Nucleated RBC 0.0    Comprehensive metabolic panel     Status: Abnormal   Result Value Ref Range    Sodium 137 133 - 144 mmol/L    Potassium 4.1 3.4 - 5.3 mmol/L    Chloride 105 94 - 109 mmol/L    Carbon Dioxide 26 20 - 32 mmol/L    Anion Gap 6 3 - 14 mmol/L    Glucose 115 (H) 70 - 99 mg/dL    Urea Nitrogen 6 (L) 7 - 30 mg/dL    Creatinine 0.44 (L) 0.52 - 1.04 mg/dL    GFR Estimate >90 >60 mL/min/[1.73_m2]    GFR Estimate If Black >90 >60 mL/min/[1.73_m2]    Calcium 8.6 8.5 - 10.1 mg/dL    Bilirubin Total 0.5 0.2 - 1.3 mg/dL    Albumin 2.7 (L) 3.4 - 5.0 g/dL    Protein Total 6.8 6.8 - 8.8 g/dL    Alkaline Phosphatase 140 40 - 150 U/L    ALT 15 0 - 50 U/L    AST 28 0 - 45 U/L   Glucose by meter     Status: Abnormal   Result Value Ref Range    Glucose 143 (H) 70 - 99 mg/dL   INR     Status: None   Result Value Ref Range    INR 1.14 0.86 - 1.14   SARS-CoV-2 COVID-19 Virus (Coronavirus) RT-PCR Nasopharyngeal     Status: None    Specimen: Nasopharyngeal   Result Value Ref Range    SARS-CoV-2 Virus Specimen Source Nasopharyngeal     SARS-CoV-2 PCR Result NEGATIVE     SARS-CoV-2 PCR Comment       Testing was performed using the [employer] Xpress SARS-CoV-2 Assay on the Cepheid Gene-Xpert   Instrument Systems. Additional information about this Emergency Use Authorization (EUA)   assay can be found via the Lab Guide.     Glucose by meter     Status: Abnormal   Result Value Ref Range    Glucose 151 (H) 70 - 99 mg/dL   Glucose by meter     Status: Abnormal   Result Value Ref Range    Glucose 122 (H) 70 - 99 mg/dL   Glucose by meter     Status: Abnormal   Result Value Ref Range    Glucose 145 (H)  70 - 99 mg/dL   Glucose by meter     Status: Abnormal   Result Value Ref Range    Glucose 104 (H) 70 - 99 mg/dL   CBC with platelets     Status: Abnormal   Result Value Ref Range    WBC 11.2 (H) 4.0 - 11.0 10e9/L    RBC Count 3.60 (L) 3.8 - 5.2 10e12/L    Hemoglobin 11.8 11.7 - 15.7 g/dL    Hematocrit 34.7 (L) 35.0 - 47.0 %    MCV 96 78 - 100 fl    MCH 32.8 26.5 - 33.0 pg    MCHC 34.0 31.5 - 36.5 g/dL    RDW 15.3 (H) 10.0 - 15.0 %    Platelet Count 226 150 - 450 10e9/L   Basic metabolic panel     Status: Abnormal   Result Value Ref Range    Sodium 139 133 - 144 mmol/L    Potassium 3.6 3.4 - 5.3 mmol/L    Chloride 103 94 - 109 mmol/L    Carbon Dioxide 30 20 - 32 mmol/L    Anion Gap 6 3 - 14 mmol/L    Glucose 115 (H) 70 - 99 mg/dL    Urea Nitrogen 11 7 - 30 mg/dL    Creatinine 0.47 (L) 0.52 - 1.04 mg/dL    GFR Estimate >90 >60 mL/min/[1.73_m2]    GFR Estimate If Black >90 >60 mL/min/[1.73_m2]    Calcium 8.8 8.5 - 10.1 mg/dL   Glucose by meter     Status: Abnormal   Result Value Ref Range    Glucose 125 (H) 70 - 99 mg/dL   Glucose by meter     Status: Abnormal   Result Value Ref Range    Glucose 118 (H) 70 - 99 mg/dL   Magnesium     Status: None   Result Value Ref Range    Magnesium 1.7 1.6 - 2.3 mg/dL   Phosphorus     Status: None   Result Value Ref Range    Phosphorus 3.7 2.5 - 4.5 mg/dL   Glucose by meter     Status: Abnormal   Result Value Ref Range    Glucose 103 (H) 70 - 99 mg/dL   Creatinine     Status: Abnormal   Result Value Ref Range    Creatinine 0.47 (L) 0.52 - 1.04 mg/dL    GFR Estimate >90 >60 mL/min/[1.73_m2]    GFR Estimate If Black >90 >60 mL/min/[1.73_m2]   Glucose by meter     Status: None   Result Value Ref Range    Glucose 90 70 - 99 mg/dL   Glucose by meter     Status: Abnormal   Result Value Ref Range    Glucose 101 (H) 70 - 99 mg/dL   Magnesium     Status: Abnormal   Result Value Ref Range    Magnesium 1.4 (L) 1.6 - 2.3 mg/dL   Phosphorus     Status: None   Result Value Ref Range    Phosphorus  3.7 2.5 - 4.5 mg/dL   Potassium     Status: None   Result Value Ref Range    Potassium 3.4 3.4 - 5.3 mmol/L   Magnesium     Status: None   Result Value Ref Range    Magnesium 1.9 1.6 - 2.3 mg/dL   Glucose by meter     Status: Abnormal   Result Value Ref Range    Glucose 112 (H) 70 - 99 mg/dL   Glucose by meter     Status: Abnormal   Result Value Ref Range    Glucose 101 (H) 70 - 99 mg/dL   Magnesium     Status: None   Result Value Ref Range    Magnesium 1.9 1.6 - 2.3 mg/dL   Phosphorus     Status: None   Result Value Ref Range    Phosphorus 4.2 2.5 - 4.5 mg/dL   Comprehensive metabolic panel     Status: Abnormal   Result Value Ref Range    Sodium 139 133 - 144 mmol/L    Potassium 3.3 (L) 3.4 - 5.3 mmol/L    Chloride 101 94 - 109 mmol/L    Carbon Dioxide 31 20 - 32 mmol/L    Anion Gap 7 3 - 14 mmol/L    Glucose 103 (H) 70 - 99 mg/dL    Urea Nitrogen 5 (L) 7 - 30 mg/dL    Creatinine 0.49 (L) 0.52 - 1.04 mg/dL    GFR Estimate >90 >60 mL/min/[1.73_m2]    GFR Estimate If Black >90 >60 mL/min/[1.73_m2]    Calcium 8.7 8.5 - 10.1 mg/dL    Bilirubin Total 0.7 0.2 - 1.3 mg/dL    Albumin 2.6 (L) 3.4 - 5.0 g/dL    Protein Total 6.8 6.8 - 8.8 g/dL    Alkaline Phosphatase 119 40 - 150 U/L    ALT 16 0 - 50 U/L    AST 24 0 - 45 U/L   CBC with platelets     Status: Abnormal   Result Value Ref Range    WBC 9.2 4.0 - 11.0 10e9/L    RBC Count 3.64 (L) 3.8 - 5.2 10e12/L    Hemoglobin 11.8 11.7 - 15.7 g/dL    Hematocrit 35.0 35.0 - 47.0 %    MCV 96 78 - 100 fl    MCH 32.4 26.5 - 33.0 pg    MCHC 33.7 31.5 - 36.5 g/dL    RDW 15.0 10.0 - 15.0 %    Platelet Count 210 150 - 450 10e9/L   Glucose by meter     Status: None   Result Value Ref Range    Glucose 93 70 - 99 mg/dL   Glucose by meter     Status: None   Result Value Ref Range    Glucose 86 70 - 99 mg/dL   Potassium     Status: None   Result Value Ref Range    Potassium 3.5 3.4 - 5.3 mmol/L   Glucose by meter     Status: Abnormal   Result Value Ref Range    Glucose 105 (H) 70 - 99 mg/dL    Glucose by meter     Status: None   Result Value Ref Range    Glucose 99 70 - 99 mg/dL   Comprehensive metabolic panel     Status: Abnormal   Result Value Ref Range    Sodium 137 133 - 144 mmol/L    Potassium 3.8 3.4 - 5.3 mmol/L    Chloride 101 94 - 109 mmol/L    Carbon Dioxide 30 20 - 32 mmol/L    Anion Gap 6 3 - 14 mmol/L    Glucose 117 (H) 70 - 99 mg/dL    Urea Nitrogen 7 7 - 30 mg/dL    Creatinine 0.46 (L) 0.52 - 1.04 mg/dL    GFR Estimate >90 >60 mL/min/[1.73_m2]    GFR Estimate If Black >90 >60 mL/min/[1.73_m2]    Calcium 8.9 8.5 - 10.1 mg/dL    Bilirubin Total 0.4 0.2 - 1.3 mg/dL    Albumin 2.5 (L) 3.4 - 5.0 g/dL    Protein Total 6.6 (L) 6.8 - 8.8 g/dL    Alkaline Phosphatase 116 40 - 150 U/L    ALT 14 0 - 50 U/L    AST 20 0 - 45 U/L   Magnesium     Status: None   Result Value Ref Range    Magnesium 2.0 1.6 - 2.3 mg/dL   Phosphorus     Status: None   Result Value Ref Range    Phosphorus 3.5 2.5 - 4.5 mg/dL   CBC with platelets     Status: Abnormal   Result Value Ref Range    WBC 9.5 4.0 - 11.0 10e9/L    RBC Count 3.46 (L) 3.8 - 5.2 10e12/L    Hemoglobin 11.4 (L) 11.7 - 15.7 g/dL    Hematocrit 33.6 (L) 35.0 - 47.0 %    MCV 97 78 - 100 fl    MCH 32.9 26.5 - 33.0 pg    MCHC 33.9 31.5 - 36.5 g/dL    RDW 15.0 10.0 - 15.0 %    Platelet Count 207 150 - 450 10e9/L   Asymptomatic COVID-19 Virus (Coronavirus) by PCR     Status: None    Specimen: Nasopharyngeal   Result Value Ref Range    COVID-19 Virus PCR to [facility] - Source Nasopharyngeal     COVID-19 Virus PCR to [facility] - Result       Test received-See reflex to IDDL test SARS CoV2 (COVID-19) Virus RT-PCR   SARS-CoV-2 COVID-19 Virus (Coronavirus) RT-PCR Nasopharyngeal     Status: None    Specimen: Nasopharyngeal   Result Value Ref Range    SARS-CoV-2 Virus Specimen Source Nasopharyngeal     SARS-CoV-2 PCR Result NEGATIVE     SARS-CoV-2 PCR Comment       Testing was performed using the Xpert Xpress SARS-CoV-2 Assay on the Cepheid Gene-Xpert   Instrument Systems.  Additional information about this Emergency Use Authorization (EUA)   assay can be found via the Lab Guide.     CBC with platelets     Status: Abnormal   Result Value Ref Range    WBC 9.9 4.0 - 11.0 10e9/L    RBC Count 3.54 (L) 3.8 - 5.2 10e12/L    Hemoglobin 11.4 (L) 11.7 - 15.7 g/dL    Hematocrit 33.8 (L) 35.0 - 47.0 %    MCV 96 78 - 100 fl    MCH 32.2 26.5 - 33.0 pg    MCHC 33.7 31.5 - 36.5 g/dL    RDW 14.4 10.0 - 15.0 %    Platelet Count 236 150 - 450 10e9/L   Comprehensive metabolic panel     Status: Abnormal   Result Value Ref Range    Sodium 135 133 - 144 mmol/L    Potassium 3.9 3.4 - 5.3 mmol/L    Chloride 101 94 - 109 mmol/L    Carbon Dioxide 30 20 - 32 mmol/L    Anion Gap 4 3 - 14 mmol/L    Glucose 122 (H) 70 - 99 mg/dL    Urea Nitrogen 9 7 - 30 mg/dL    Creatinine 0.48 (L) 0.52 - 1.04 mg/dL    GFR Estimate >90 >60 mL/min/[1.73_m2]    GFR Estimate If Black >90 >60 mL/min/[1.73_m2]    Calcium 9.1 8.5 - 10.1 mg/dL    Bilirubin Total 0.4 0.2 - 1.3 mg/dL    Albumin 2.6 (L) 3.4 - 5.0 g/dL    Protein Total 7.0 6.8 - 8.8 g/dL    Alkaline Phosphatase 118 40 - 150 U/L    ALT 15 0 - 50 U/L    AST 19 0 - 45 U/L   Magnesium     Status: None   Result Value Ref Range    Magnesium 1.9 1.6 - 2.3 mg/dL   Social Work IP Consult     Status: None ()    Narrative    Millicent Vallejo LSW     12/4/2020 12:30 PM  Care Management Initial Consult    General Information  Assessment completed with: Christiano Karen  Type of CM/SW Visit: Initial Assessment  Primary Care Provider verified and updated as needed: Yes   Readmission within the last 30 days:        Reason for Consult: discharge planning  Advance Care Planning:     NA     Communication Assessment  Patient's communication style: spoken language (English or   Bilingual)(Pt used white board to answer SW questions)    Hearing Difficulty or Deaf: no   Wear Glasses or Blind: no    Cognitive  Cognitive/Neuro/Behavioral: WDL  Level of Consciousness: alert          Mood/Behavior:  calm;cooperative  Best Language: (able to   write on white board for communication)  Speech: trached    Living Environment:   People in home: alone     Current living Arrangements: apartment      Able to return to prior arrangements: yes     Family/Social Support:  Care provided by: self  Provides care for: no one  Marital Status:   Children;Sibling(s)        Pt reports that her 2 sons, sister &   brother all live locally.  Description of Support System: Supportive;Involved    Support Assessment: Adequate family and caregiver   support;Adequate social supports    Current Resources:   Skilled Home Care Services:  None yet  Community Resources: None  Equipment currently used at home: none  Supplies currently used at home: None    Employment/Financial:  Employment Status: disabled        Financial Concerns: No concerns identified   Lifestyle & Psychosocial Needs:  Socioeconomic History     Marital status:      Spouse name: Not on file     Number of children: Not on file     Years of education: Not on file     Highest education level: Not on file     Tobacco Use     Smoking status: Former Smoker     Packs/day: 0.50     Types: Cigarettes     Quit date: 10/17/2020     Years since quittin.1     Smokeless tobacco: Never Used     Tobacco comment: has not smoked since 10/17/20   Substance and Sexual Activity     Alcohol use: No     Comment: pt. has not had any alcohol since hospitalization   2018     Drug use: No     Comment: hx of alcohol abuse.     Sexual activity: Not Currently     Partners: Male     Functional Status:  Prior to admission patient needed assistance: None    Mental Health Status:  NA        Chemical Dependency Status:  Chemical Dependency Status: No Current Concerns   Pt reports she   has not drank for previous several days.        Values/Beliefs:  Spiritual, Cultural Beliefs, Faith Practices, Values that   affect care:       NA        Additional Information:  MAXWELL met with pt today  "to discuss hospitalization & discharge   planning.  SW and pt discussed 2 plans of care for dischargin. Go home with [facility] ([phone] Fax:   [phone]) vs.  2.  Go to a TCU for further education, teaching & stability for   her medical plan of care.  Referrals not sent today, as pt hopes   to discharge to home.      Pt requests that SW work with [facility]   ([phone] Fax: [phone]) as she prefers to stay in   network with her PCP.  SW sent referral to [facility] today & also had   discussion with a [facility] RN who shared, \"The patient would just   need to be made acutely aware that we would be there to reinforce   the teaching that she has hopefully already been given in the   hospital prior to discharge on her trach care (cleaning, dressing   changes, suctioning, lavage, etc).  We will not have capacity to   visit her daily, or even multiple times per week to perform her   care for her - she will need to be independent with this care   and/or have dependable, easily accessible support available to   her to help with this possible multiple times per day (sometimes   at the drop of a hat).  I don t mean to be dramatic, but that is   simply the reality  of having a new trach while living alone,   especially in these times.\"  SW responded that hospital RN staff   will be educating & documenting pt's cares over the next several   days, which will assist us in creating a safe plan of care for   the pt.    [facility] staff also instructed that  Home Care will need to order   the following supplies for the pt upon discharge, per MD Canales's   note today, \"The patient will need trach teaching, trach   supplies, and evaluation to see if she would need some home   health given that she lives alone.  The patient needs the following supplies for home:  - An extra #6 cuffless Shiley tracheostomy tube with disposable   inner cannula  - Multiple disposable inner cannulas for a #6 Shiley " "tracheostomy   tube (enough for roughly 1 a day, 30-month, etc.)  - 10 or 12 Fr suction catheters for trach cares (enough for 3-4   per day)  - Multiple saline jets (3-5 mL) for tracheal lavage (3-4 per day)  - Portable suction machine  - Soft reusable Velcro trach ties\"    CTS to continue to follow for discharge planning needs &   supplies.    Millicent Vallejo, [facility]       Nutrition Services Adult IP Consult     Status: None ()    Ana Maria Varghese, GAB     12/5/2020 10:35 AM  CLINICAL NUTRITION SERVICES - ASSESSMENT NOTE     Nutrition Prescription    RECOMMENDATIONS FOR MDs/PROVIDERS TO ORDER:  -MD to change to non-dextrose IVF once TF starts  -MD to adjust IVF as TF advances towards goal    Malnutrition Status:    Patient does not meet two of the established criteria necessary   for diagnosing malnutrition but is at risk for malnutrition    Recommendations already ordered by Registered Dietitian (RD):  Once placement is verified and tube can be used safely, recommend   the following TF regimen:    1) Isosource 1.5 via NG tube at 20 mL/hr x 24 hrs.  This rate   will meet 44% calorie needs and 48% protein needs. Recheck   refeeding labs and overall TF tolerance 12/6. RD to update   orders.    2) Water flushes of 30 mL q 4 hrs for tube patency.     ADDENDUM 1027 12/5: RD to place orders to start feeds.    Future/Additional Recommendations:  --RD to adjust water flushes as TF advances towards goal rate,   pending IVF.  -suspect patient will need to transition to bolus feeds once PEG   placed, pending discharge plan. RD to monitor.     ADDENDUM 1027 12/5: Discussed placement w/ Dr. Boggs. Tube is at   least in the stomach, and interpretation is that the tip of the   tube is in the duodenum and safe to use. IVF also changed to   non-dextrose LR @ 100 mL/hr.    REASON FOR ASSESSMENT  Karen Tee is a/an 64 year old female assessed by the   dietitian for Provider Order - Registered Dietitian to Assess " "and   Order TF per Medical Nutrition Therapy Protocol.    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 Biopsy pending. PMH is significant for alcoholic   cirrhosis with ascites (patient denies daily use, but has had   previous paracentesis due to cirrhosis and ascites), and quit   smoking tobacco 2 months ago. Patient had NG tube placement 12/4   after failing all consistencies on swallow study. High risk for   aspiration. Per radiology results, tip of the feeding tube is   projected over the proximal duodenum. Nonobstructive bowel gas   pattern. Waiting to hear back from MD if tube is ok to use. Plan   for PEG tube early next week. Patient lives alone but family   lives locally.    NUTRITION HISTORY  Obtained from patient (communicates via whiteboard)  -normal intake is 2-3 meals/day with good appetite, but intake   does vary. Takes a daily MVITM.  -ear pain started about 2 months ago, and this is when her   appetite started to decline. Since then, she's been eating about   25% of her 2-3 meals/day.    CURRENT NUTRITION ORDERS  Diet: None - plan to start TF  Intake/Tolerance: none since admission (LOS day 2). Normoactive   BS in all quadrants, abdomen distended.    LABS  Na 139 (WNL)  K+ 3.6 (WNL)  Phos pending  Mag pending  Creatinine 0.47 (L)  GFR > 90  BUN 11 (WNL)    12/3:  Alk Phos 140 (WNL)  ALT 15 (WNL)  AST 28 (WNL)    MEDICATIONS  IVF: D5 in LR @ 125 mL/hr    ANTHROPOMETRICS  Height: 160 cm (5' 2.992\")  Most Recent Weight: 70.1 kg (154 lb 8.7 oz)    IBW: 50 kg (140% IBW)  BMI: Overweight BMI 25-29.9  Weight History:   Wt Readings from Last 10 Encounters:   12/05/20 70.1 kg (154 lb 8.7 oz)   11/04/20 65.8 kg (145 lb)   03/10/20 64.9 kg (143 lb)   02/18/20 64.4 kg (142 lb)   07/06/18 64.1 kg (141 lb 6.4 oz)   06/18/18 65.6 kg (144 lb 10 oz) "   06/11/18 73.5 kg (162 lb)   03/09/12 51.7 kg (114 lb)   12/13/10 51.3 kg (113 lb)   08/23/10 53.5 kg (118 lb)   -weight appears to be steady over the last 10 months.  -patient denies any unintentional weight loss. Does not know her   UBW, but when RD informed her that weight has been ~ 140-145 lbs   she said this sounds about right.    Vitals:    12/03/20 1703 12/04/20 0700 12/05/20 0500   Weight: 65.8 kg (145 lb) 68 kg (149 lb 14.6 oz) 70.1 kg (154 lb   8.7 oz)   -daily weights are ordered  -suspect weight is trending up due to IVF infusing since   admission or fluid retention with cirrhosis. Will use admit   weight of 66 kg as dosing weight.    Dosing Weight: 66 kg (appears to be UBW)    ASSESSED NUTRITION NEEDS  Estimated Energy Needs: 5631-0321 kcals/day (25 - 30 kcals/kg)  Justification: Maintenance  Estimated Protein Needs: 66-79 grams protein/day (1 - 1.2 grams   of pro/kg)  Justification: Maintenance  Estimated Fluid Needs: (1 mL/kcal)   Justification: Per provider pending fluid status    PHYSICAL FINDINGS  No abnormal nutrition-related physical findings observed.     MALNUTRITION  % Intake: </=75% for >/= 1 month (severe)  % Weight Loss: None noted  Subcutaneous Fat Loss: None observed  Muscle Loss: None observed  Fluid Accumulation/Edema: None noted  Malnutrition Diagnosis: Patient does not meet two of the   established criteria necessary for diagnosing malnutrition but is   at risk for malnutrition.    NUTRITION DIAGNOSIS  Inadequate oral intake related to poor appetite secondary to ear   pain as evidenced by patient report of eating 25% of 2-3   meals/day over the last 2 months.      INTERVENTIONS  Implementation  Nutrition Education: Provided education on role of RD in tube   feeding care.   Collaboration with other providers  Enteral Nutrition - Initiate  Feeding tube flush    Once placement is verified and tube can be used safely, recommend   the following TF regimen:    1) Isosource 1.5 via NG tube  at 20 mL/hr x 24 hrs.  This rate   will meet 44% calorie needs and 48% protein needs. Recheck   refeeding labs and overall TF tolerance. If tolerating and labs   are WNL, advance TF rate by 10 mL/hr q 8 hrs to goal rate of 50   mL/hr x 24 hrs. Do not advance TF if lytes < WNL. Isosource 1.5 @   goal 50 ml/hr (1200 ml/day) to provide 1800 kcals (27   kcal/kg/day), 82 g PRO (1.2 g/kg/day), 211 g CHO, 71 g Fat, 18 g   Fiber and 912 mL free water daily. This will meet 100% of calorie   and protein needs.    2) Water flushes of 30 mL q 4 hrs for tube patency.       -MD to change to non-dextrose IVF once TF starts  -MD to adjust IVF as TF advances towards goal rate.  -RD to adjust water flushes as TF advances towards goal rate,   pending IVF.  -suspect patient will need to transition to bolus feeds once PEG   placed, pending discharge plan. RD to monitor.      Goals  Total avg nutritional intake to meet a minimum of 25 kcal/kg and   66 g PRO/kg daily (per dosing wt 66 kg).     Monitoring/Evaluation  Progress toward goals will be monitored and evaluated per   protocol.    Ana Maria Daniel RDN, ADRIENNE  Clinical Dietitian  Office: [phone]  Weekend Pager: [phone]     Surgery General IP Consult: Patient to be seen: Routine within 24 hrs; ? G-tube placement; Consultant may enter orders: Yes; Requesting provider? Hospitalist (if different from attending physician)     Status: None ()    Dony Santacruz,      12/7/2020 10:28 AM  Surgical Consultation/History and Physical  [facility]    Karen is seen in consultation for feeding tube placement, at the   request of the hospitalist service.    Chief Complaint:  Difficulty Breathing    History of Present Illness: Karen Tee is a 64 year old   female presents with difficulty breathing.  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  Patient has known cirrhosis and previous significant   ascites with large volume paracentesis.  She feels her abdomen is   \"big again\".  She recently began consuming alcohol again.  She   does not follow regularly with hepatology.  She has a Dobhoff   feeding tube in place at present.    Patient Active Problem List   Diagnosis     Chronic depressive personality disorder     Major depressive disorder, single episode, mild (H)     Elevated blood pressure reading without diagnosis of   hypertension     Tobacco use disorder     Hyperlipidemia     Inflammatory disease of breast     Ascites     Portal hypertension (H)     HYPERLIPIDEMIA LDL GOAL <100     Alcohol dependence (H)     Cirrhosis of liver with ascites, unspecified hepatic cirrhosis   type (H)     Hyponatremia     Abnormal LFTs     Elevated INR     Leukocytosis     Laryngeal mass     Neck mass     Shortness of breath     Personal history of tobacco use, presenting hazards to health     S/P emergency tracheotomy for assistance in breathing (H)     Habitual alcohol use     Tracheostomy in place (H)     Alcoholic cirrhosis of liver with ascites (H)       Past Medical History:   Diagnosis Date     Alcohol dependence (H)      Chronic depressive personality disorder      Cirrhoses, liver     diagnosed 7/10     Tobacco dependence        Past Surgical History:   Procedure Laterality Date     HC EXCISION BREAST LESION, OPEN >=1       TRACHEOSTOMY N/A 12/3/2020    Procedure: AWAKE FIBEROPTIC INTUBATION, OPEN TRACHEOSTOMY,   DIRECT LARYNGOSCOPY WITH BIOPSY;  Surgeon: Marshall Canales MD;    Location: [facility] OR       Family History   Problem Relation Age of Onset     Hypertension Mother      Depression Sister         and Anxiety.     Psychotic Disorder Sister         severe anxiety     Neurologic Disorder Brother          from brain tumor.       Social History     Tobacco Use     Smoking status: Former Smoker     Packs/day: 0.50     Types: Cigarettes     Quit " "date: 10/17/2020     Years since quittin.1     Smokeless tobacco: Never Used     Tobacco comment: has not smoked since 10/17/20   Substance Use Topics     Alcohol use: No     Comment: pt. has not had any alcohol since hospitalization   2018        History   Drug Use No     Comment: hx of alcohol abuse.       No current outpatient medications on file.       Allergies   Allergen Reactions     Nka [No Known Allergies]        Review of Systems:   Please see aforementioned positives in HPI    Physical Exam:  BP (!) 152/74 (BP Location: Right arm)   Pulse 73   Temp 98.6    F (37  C) (Oral)   Resp 16   Ht 1.6 m (5' 2.99\")   Wt 69.7 kg   (153 lb 10.6 oz)   LMP 2007   SpO2 98%   BMI 27.23 kg/m        Constitutional- No acute distress, chronically ill appearing,   older than stated age  Eyes: Anicteric, no injection.  PERRL  ENT:  Normocephalic, atraumatic, Nose midline, tracheostomy in   place, self suctioning  Respiratory- Clear to auscultation bilaterally, good inspiratory   effort  Cardiovascular - Heart RRR, no lift's, thrills, murmurs, rubs, or   gallop.  No peripheral edema.  No clubbing.  Abdomen - Protuberant and distended, numerous prominent abdominal   veins  Neuro - No focal neuro deficits, Alert and oriented x 3  Psych: Appropriate mood and affect  Musculoskeletal: No gross asymmetry   Skin: Warm, Dry    Video Swallow :  XR VIDEO SWALLOW WITH SLP OR OT 2020 3:49 PM     HISTORY: Tracheostomy. Evaluate swallowing function.     FLUORO TIME:  2.0 minutes.     PROCEDURE: The patient's swallowing mechanism is assessed under  fluoroscopy in conjunction with a speech pathologist. Welling and  pudding type consistencies are utilized. 5 fluoroscopic sequences   are  obtained.     FINDINGS: The epiglottis has a truncated appearance. There is   poor  downward movement and poor laryngeal elevation when the swallow   is  triggered. Penetration is seen during the swallow to the level of "   the  vocal cords. There is no spontaneous cough reflex. Later,   penetrated  contents can be seen extending below the vocal cords, consistent   with  aspiration.                                                                      IMPRESSION: There is penetration and subsequent aspiration of   ingested  contents, as described above.    KUB:  HISTORY: Feeding tube placement     COMPARISON: None.                                                                      IMPRESSION: Tip of the feeding tube is projected over the   proximal  duodenum. Nonobstructive bowel gas pattern.     Lab Results   Component Value Date    WBC 9.2 12/07/2020     Lab Results   Component Value Date    RBC 3.64 12/07/2020     Lab Results   Component Value Date    HGB 11.8 12/07/2020     Lab Results   Component Value Date    HCT 35.0 12/07/2020     Lab Results   Component Value Date    MCV 96 12/07/2020     Lab Results   Component Value Date    MCH 32.4 12/07/2020     Lab Results   Component Value Date    MCHC 33.7 12/07/2020     Lab Results   Component Value Date    RDW 15.0 12/07/2020     Lab Results   Component Value Date     12/07/2020     Last Comprehensive Metabolic Panel:  Sodium   Date Value Ref Range Status   12/07/2020 139 133 - 144 mmol/L Final     Potassium   Date Value Ref Range Status   12/07/2020 3.3 (L) 3.4 - 5.3 mmol/L Final     Chloride   Date Value Ref Range Status   12/07/2020 101 94 - 109 mmol/L Final     Carbon Dioxide   Date Value Ref Range Status   12/07/2020 31 20 - 32 mmol/L Final     Anion Gap   Date Value Ref Range Status   12/07/2020 7 3 - 14 mmol/L Final     Glucose   Date Value Ref Range Status   12/07/2020 103 (H) 70 - 99 mg/dL Final     Urea Nitrogen   Date Value Ref Range Status   12/07/2020 5 (L) 7 - 30 mg/dL Final     Creatinine   Date Value Ref Range Status   12/07/2020 0.49 (L) 0.52 - 1.04 mg/dL Final     GFR Estimate   Date Value Ref Range Status   12/07/2020 >90 >60 mL/min/[1.73_m2] Final      Comment:     Non  GFR Calc  Starting 12/18/2018, serum creatinine based estimated GFR (eGFR)   will be   calculated using the Chronic Kidney Disease Epidemiology   Collaboration   (CKD-EPI) equation.       Calcium   Date Value Ref Range Status   12/07/2020 8.7 8.5 - 10.1 mg/dL Final     Bilirubin Total   Date Value Ref Range Status   12/07/2020 0.7 0.2 - 1.3 mg/dL Final     Alkaline Phosphatase   Date Value Ref Range Status   12/07/2020 119 40 - 150 U/L Final     ALT   Date Value Ref Range Status   12/07/2020 16 0 - 50 U/L Final     AST   Date Value Ref Range Status   12/07/2020 24 0 - 45 U/L Final     Assessment:  1. Likely head and neck cancer, near obstructing  2. Aspiration  3. Cirrhosis  4. Ascites  5. Alcohol dependence    Plan:   Ms. Tee is a 63 yo woman with chronic cirrhosis, previous   high volume paracentesis with alcohol dependence who presented to   ENT clinic with respiratory distress, patient underwent   fiberoptic intubation and subsequent tracheostomy for head and   neck cancer, pathology is still pending.  I was consulted for   enteral access.  She currently has a feeding tube in place.  I   performed a bedside ultrasound which showed mild/moderate   ascites.  She does not currently follow with a hepatologist.  Her   labs are relatively unremarkable (normal platelets, normal   bilirubin, however albumin low at 2.6, INR normal).  I discussed   the nature of feeding tubes and associated risks.  Given her   current state and comorbid conditions, I feel it is high risk to   perform a PEG or open gastrostomy tube placement at this time.    Patient requires definitive treatment of her presume head/neck   cancer with hopeful improvement in her aspiration with concurrent   treatment.  During this time, patient has current enteral access.    Discussed with Dr. Canales.  May resume tube feeds at this time.          Dony Doherty, DO on 12/7/2020 at 9:55 AM       Medications - No  data to display     Assessments & Plan (with Medical Decision Making)   Karen Tee is a 64 year old female with a past medical history significant for laryngeal mass, liver cirrhosis, alcohol dependence, hypertension, hyperlipidemia, tobacco use disorder who presents to the ED for a cardiac arrest.    Ddx: Hypoxic PEA arrest, false tracheostomy tract, mainstem intubation, pulmonary embolism, cardiac arrhythmia, Covid, airway edema    Patient arrived to the emergency department unresponsive with ongoing CPR via a [employer] device, as above.  Cyanotic with an endotracheal tube in the tracheostomy site.  Around the time the patient was transferred to the emergency department stretcher, we did note that she had a waveform on the end-tidal capnography.  High suspicion that the endotracheal tube was in a false tract.  We attempted to assess the airway with video bronchoscopy.  However there were no videobronchoscope's available in the emergency department.  Are able to locate 3 different fiberoptic bronchoscope's but did not have a fiberoptic tower for adequate visualization using this equipment.  Anesthesiology and ENT were called stat overhead to the emergency department.  CPR was continued and additional rounds of epinephrine and bicarb were given.  Patient was also given a dose of calcium to address a potential hyperkalemia. Patient was noted to have asystole during multiple pulse checks.  She made no spontaneous effort to breathe.  We placed a bougie through the endotracheal tube but this met no resistance despite being placed almost all the way through using our suspicion that the tube was not appropriately positioned in the patient's airway.  Anesthesiology and RT did end up retracting the endotracheal tube slightly and felt that this did improved the patient's coloration slightly.  They theorized the tube was main stemmed prior to repositioning.  However despite the repositioning, we did not notice any  appreciable changes in the patient's vitals.  A second anesthetists arrived with a video bronchoscopy which I was able to pass through the endotracheal tube.  I was able to visualize the ronan and bronchial rings.  We made note of the location of the endotracheal tube at the neck and secured it in this position.  I-STAT labs showed severe respiratory acidosis, abnormal glucose, and potassium level within normal limits.  Manual bagging was increased to provide hyperventilation in the setting of severe respiratory acidosis.  Patient was switched over to a ventilator.  After multiple additional rounds of CPR with a secured airway and adequate chemical ventilation, we continue to see asystole on the monitor.  At this time the patient had been hypoxic with a very prolonged downtime and a poor prognosis with multiple comorbidities including cirrhosis and a malignant neck mass.  We did not feel there were any additional reversible causes for patient's arrest that could be addressed. CPR was discontinued and the time of death was called at 2100.    I spoke briefly with the patient's son to update him on his mother's passing and offer my condolences.      I have reviewed the nursing notes. I have reviewed the findings, diagnosis, plan and need for follow up with the patient.    Discharge Medication List as of 12/9/2020 11:29 PM          Final diagnoses:   Cardiopulmonary arrest (H)       --  I, Mikel Larose, am serving as a trained medical scribe to document services personally performed by Gosia Kaiser MD, based on the provider's statements to me.     Gosia CANNON MD, was physically present and have reviewed and verified the accuracy of this note documented by Mikel Larose.    Gosia Kaiser MD  [facility] EMERGENCY DEPARTMENT  12/9/2020     Gosia Kaiser MD  12/10/20 0250

## 2020-12-10 NOTE — CONSULTS
Otolaryngology Consult Note  December 9, 2020    CC: ENT was consulted by Dr. Roblero for airway assessment after a patient with a tracheostomy had the trach tube removed and an endotracheal tube placed in the stoma during a code.    HPI: Karen Tee is a 64 year old female with a past medical history of liver cirrhosis, depression, alcohol dependence, and newly diagnosed squamous cell carcinoma of the supraglottis who presented to the ED coding. The patient had initially presented with ear pain bilaterally and then had neck fullness with some difficulty swallowing. Dr. Canales found a large right-sided mass in epiglottis, right AE fold, and right arytenoid and there was concern for airway compromise in the future and so tracheostomy was performed at [facility] on 12/3/2020 to secure the airway and facilitate safe transfer for additional care. The patient was transferred to TCU on 12/9/2020. The TCU team had concerns today about the trach as it became difficult to pass suction through it. She was hemodynamically stable and resting comfortably at that time. Evaluation at the [place] ER was planned. ENT was informed that the patient had coded upon transfer from her bed to the ambulance and the patient had her trach tube removed to try to facilitate suction. An endotracheal tube was placed through the stoma and the patient was brought to the [place] ED. By report, resuscitation efforts were ongoing throughout transfer. Upon arrival to the ED the code was ongoing without ROSC and there was some difficulty ventilating that improved slightly when the endotracheal tube was pulled back a few centimeters.     Past Medical History:   Diagnosis Date     Alcohol dependence (H)      Chronic depressive personality disorder      Cirrhoses, liver     diagnosed 7/10     Tobacco dependence        Past Surgical History:   Procedure Laterality Date     HC EXCISION BREAST LESION, OPEN >=1       TRACHEOSTOMY N/A 12/3/2020     Procedure: AWAKE FIBEROPTIC INTUBATION, OPEN TRACHEOSTOMY, DIRECT LARYNGOSCOPY WITH BIOPSY;  Surgeon: Marshall Canales MD;  Location: [facility] OR       Current Outpatient Medications   Medication Sig Dispense Refill     albuterol (PROAIR HFA/PROVENTIL HFA/VENTOLIN HFA) 108 (90 Base) MCG/ACT inhaler Inhale 2 puffs into the lungs every 4 hours as needed for shortness of breath / dyspnea or wheezing 18 g 3     albuterol (PROAIR HFA/PROVENTIL HFA/VENTOLIN HFA) 108 (90 Base) MCG/ACT inhaler Inhale 6 puffs into the lungs every 6 hours as needed for shortness of breath / dyspnea or wheezing 1 Inhaler 0     bisacodyl (DULCOLAX) 10 MG suppository Place 1 suppository (10 mg) rectally daily as needed for constipation       magnesium hydroxide (MILK OF MAGNESIA) 400 MG/5ML suspension Take 30 mLs by mouth daily as needed for constipation       multivitamin w/minerals (THERA-VIT-M) tablet Take 1 tablet by mouth daily       oxyCODONE (ROXICODONE) 5 MG/5ML solution Take 5 mLs (5 mg) by mouth every 4 hours as needed for moderate to severe pain 160 mL 0     sulfamethoxazole-trimethoprim (BACTRIM/SEPTRA) 8 mg/mL suspension 20 mLs (160 mg) by Per Feeding Tube route 2 times daily for 4 days            Allergies   Allergen Reactions     Nka [No Known Allergies]        Social History     Socioeconomic History     Marital status:      Spouse name: Not on file     Number of children: Not on file     Years of education: Not on file     Highest education level: Not on file   Occupational History     Not on file   Social Needs     Financial resource strain: Not on file     Food insecurity     Worry: Not on file     Inability: Not on file     Transportation needs     Medical: Not on file     Non-medical: Not on file   Tobacco Use     Smoking status: Former Smoker     Packs/day: 0.50     Types: Cigarettes     Quit date: 10/17/2020     Years since quittin.1     Smokeless tobacco: Never Used     Tobacco comment: has not smoked since 10/17/20    Substance and Sexual Activity     Alcohol use: No     Comment: pt. has not had any alcohol since hospitalization 2018     Drug use: No     Comment: hx of alcohol abuse.     Sexual activity: Not Currently     Partners: Male   Lifestyle     Physical activity     Days per week: Not on file     Minutes per session: Not on file     Stress: Not on file   Relationships     Social connections     Talks on phone: Not on file     Gets together: Not on file     Attends Gnosticism service: Not on file     Active member of club or organization: Not on file     Attends meetings of clubs or organizations: Not on file     Relationship status: Not on file     Intimate partner violence     Fear of current or ex partner: Not on file     Emotionally abused: Not on file     Physically abused: Not on file     Forced sexual activity: Not on file   Other Topics Concern     Parent/sibling w/ CABG, MI or angioplasty before 65F 55M? Yes   Social History Narrative     Not on file       Family History   Problem Relation Age of Onset     Hypertension Mother      Depression Sister         and Anxiety.     Psychotic Disorder Sister         severe anxiety     Neurologic Disorder Brother          from brain tumor.       ROS: not able to be performed due to status of patient     PHYSICAL EXAM:  General: sebastián on performing compressions  BP (!) 94/20   Pulse (!) 0   LMP 2007   HEAD: pale and blue discoloration of skin  Neck:  Stoma with ETT in place at 15. No bleeding from stoma. No abnormalities of stoma.   Nose: no anterior bleeding  Mouth: no bleeding  Neuro: unresponsive   Respiratory: intubated and masking  Cardio: pale and not perfused    FIBEROPTIC TRACHEOSCOPY:  Due to concerns regarding placement of ETT, fiberoptic tracheoscopy was indicated. No consent obtained as procedure was deemed emergent and necessary. The fiberoptic laryngoscope was passed under endoscopic vision through the ETT after ventilation was held. The ETT  was in good position and the ronan was visualized 2 cm below the endotracheal tube and the ETT was at 15cm at the stoma when this was performed. There was a small amount of blood visible that was suctioned. The scope was removed and ventilation was resumed.    Imaging: no imaging in ED    Assessment and Plan  Karen Tee is a 64 year old female with a past medical history of liver cirrhosis, depression, alcohol dependence, and newly diagnosed squamous cell carcinoma of the supraglottis who arrived to the ED coding. An ETT was placed at the TCU through the stoma after the trach was removed. Because this was placed blindly, there was a question regarding the placement and ability to ventilate. I arrived to the emergency department and the scope was being set up. Suction was used once to clear the tube from a small amount of blood and the scope was passed into the ETT and the ronan was visualized and the tube appeared to be in good position. There were no abnormalities around the stoma site and the ETT was at 15 at the stoma when verified to be in good position during the scope exam.  After several minutes with the ETT ensured to be in the correct position, the patient did not regain a pulse and the emergency department team stopped the code.     The patient was discussed with and seen by chief resident Dr. Yadi Valdivia and attending Dr. Osuna.     Negro Frey, PGY-3  Otolaryngology-Head & Neck Surgery  Please page ENT with questions by dialing [phone] and entering job code [id number] when prompted.    Physician Attestation   I, Destinee Osuna MD, saw this patient with the resident and agree with the resident/fellow's findings and plan of care as documented in the note. The emergency procedure was completed shortly before my arrival.    I personally reviewed vital signs, medications, labs and imaging.    Key findings: 64yoF with history of liver cirrhosis, depression, alcohol dependence, and recently  diagnosed supraglottic squamous cell carcinoma requiring tracheostomy, recently transferred to TCU from [facility]. We were requested to evaluate the tracheostomy as it was difficult to pass suction through it even when she was resting comfortably. She subsequently had a code blue and the trach was removed. An endotracheal tube was placed through the stoma and she was then transported to the ER with ongoing resuscitation efforts. Upon arrival, the endotracheal tube was reportedly pulled back slightly by Anesthesia which led to slightly improved ventilation; endoscopic examination verified that the endotracheal tube was in good position in the trachea. Trach site in the neck appeared unremarkable. Unfortunately, despite ongoing resuscitation efforts with verified airway access, the patient did not recover and resuscitation efforts were ultimately stopped.    Destinee Osuna MD  Date of Service (when I saw the patient): 12/09/20

## 2020-12-10 NOTE — ED NOTES
Course of the code:  2022: arrival CPR IN PROGRESS  2029- PEA, PAUSE, NO PULSE  2029 1 MG EPI GIVEN   2030 1 AMP BICARB GIVEN  2030 IO R TIBIA  2031 NO PULSE, RESUME CPR  2034 CALCIUM 1 G  2035 NO PULSE, RESUME CPR   2037 NO PULSE, RESUME CPR  2041 NO PULSE, RESUME CPR  2044 1 MG EPI GIVEN   2045 NO PULSE PEA  2047 NO PULSE PEA  2050 NO PULSE, ASYSTOLE  2050 ET-TUBE PLACED  2053 1 AMP BICARB  2057 NO PULSE ASYSTOLE  2100- NO PULSE, ASYSTOLE, TIME OF DEATH 2101

## 2020-12-10 NOTE — PROGRESS NOTES
RT came to code blue. CPR had been started and nurse was manually ventilating with Ambu bag to trach as RT entered the room. RT took over manual ventilation and CPR continued via sebastián CPR machine. Patient was then intubated at tracheal site with size 6.0 ETT. Colorimetry used to confirm ventilation. RT and anesthesia team attempted to secure tube with tape before being transferred to [place].

## 2020-12-10 NOTE — PROGRESS NOTES
Pt was breathing ok at 96% room air using speaking valve around 1630. Then wanted to be suction, done deep suction with moderate amount clear secretion. Cleaned the trach site and changed the inner cannula. Asked pt if I can do her  admission questions, pt said she wanted to rest then I left her room around 7130. Went back to check on her pt was breathing different assessed the trach site and the trach was out about 2-3cm. Asked the pt if she pulled it when she tried to remove her speaking valve she said yes.  Called the RT and the RT tried to push it back and it was hard to push and it was painful to the pt. Paged the MD and the MD pushed the trach. While we were there with the MD pt was anxious and  tried to take her trach out. Then the doctor said try to suction her. I tried to put the suction cath, I felt resistant. Then the doctor decided to send her to the ER. Around 2000 EMT came to take her and pt needed to be suction and I was not able to put the suction cath then pt started to code. called code blue.  I removed the trach out and started suctioning the stoma then pt was holding my hand. I asked the EMT to bag her he said he can not until I put the trach. I put a new trach in and started CPR.  While I was bagging her she was closing and opening of her eyes. Then the code blue team took over. Pt left the unit around 2045.

## 2020-12-10 NOTE — PROVIDER NOTIFICATION
Per Staff RN & RT,  trach is out about 0.5 inch. New trach, Per RT, when they try to push it back the patient ouched and RT did not want to take a risk to injure the pt, they  recommended MD take a look at it.  MD/JOLENE paged. Dr Corona, will stop by to look at it. Pt stable no respiratory distress or difficulty breathing.

## 2020-12-11 ENCOUNTER — TELEPHONE (OUTPATIENT)
Dept: OTOLARYNGOLOGY | Facility: CLINIC | Age: 65
End: 2020-12-11

## 2020-12-11 NOTE — TELEPHONE ENCOUNTER
I spoke to the patient's son, Ben, on the phone regarding his mother.  I have spoken with him previously when I was caring for his mother at [facility] in [place].  Unfortunately, his mother had a cardiorespiratory event and she passed on 12/9/2020.  I offered my condolences and offered support to the patient's son.  Ben seemed to be doing well and did not have any current questions.  He was hoping to obtain his mother's belongings at some point.  I will send an email regarding his mom's personal effects.  I encouraged to Ben to contact me with any further concerns.    Marshall Canlaes MD  Department of Otolarygology-Head and Neck Surgery  [facility]

## 2020-12-14 NOTE — PROGRESS NOTES
Spoke with patient's son, Ben. Belongings to be sent by  to his address: [address] [id number]. Alyson Mccormick RN on 12/14/2020 at 9:04 AM

## 2022-08-03 NOTE — PROGRESS NOTES
Clinic Care Coordination Contact  [facility]/ No Voicemail-rings busy    Referral Source: IP Handoff  Per chart notes, Patient was DC'd from [facility] 6/21 (after 2 days there) AMA, She did follow up with PCP on 7/6/18.  Clinical Data: Care Coordinator Outreach  Outreach attempted x 2.  Left message on voicemail with call back information and requested return call.  Plan: Care Coordinator mailed out care coordination introduction letter on 7/13/18. Care Coordinator will try to reach patient again in 3-5 business days.    Sascha MATTHEWS,RN- BC  Clinic Care Coordinator  [facility]  Phone: [phone]             Render In Strict Bullet Format?: No Plan: Culture done today Detail Level: Detailed Initiate Treatment: Triamcinolone 0.1% ointment twice daily for up to 2 weeks Initiate Treatment: Mupirocin ointment daily (  pt has ointment at home) \\nVaseline

## (undated) DEVICE — NDL 27GA 1.25" 305136

## (undated) DEVICE — SU CHROMIC 3-0 SH 27" G122H

## (undated) DEVICE — DRAPE U SPLIT 74X120" 29440

## (undated) DEVICE — SYR 50ML CATH TIP W/O NDL 309620

## (undated) DEVICE — TUBING SUCTION 12"X1/4" N612

## (undated) DEVICE — SYR 10ML FINGER CONTROL W/O NDL 309695

## (undated) DEVICE — LABEL MEDICATION SYSTEM 3303-P

## (undated) DEVICE — Device

## (undated) DEVICE — SU SILK 2-0 SH 30" K833H

## (undated) DEVICE — DRSG KERLIX FLUFFS X5

## (undated) DEVICE — DRSG DRAIN 4X4" 7086

## (undated) DEVICE — NDL COUNTER 20CT 31142493

## (undated) DEVICE — GLOVE PROTEXIS BLUE W/NEU-THERA 7.5  2D73EB75

## (undated) DEVICE — LIGHT HANDLE X2

## (undated) DEVICE — SUCTION TIP YANKAUER STR K87

## (undated) DEVICE — SPONGE RAY-TEC 4X8" 7318

## (undated) DEVICE — BLADE KNIFE SURG 15 371115

## (undated) DEVICE — ESU ELEC BLADE 2.75" COATED/INSULATED E1455

## (undated) DEVICE — ESU CORD BIPOLAR GREEN 10-4000

## (undated) DEVICE — ESU PENCIL W/COATED BLADE E2450H

## (undated) DEVICE — SPONGE KITTNER 31001010

## (undated) DEVICE — BLADE KNIFE SURG 10 371110

## (undated) DEVICE — GOWN XLG DISP 9545

## (undated) DEVICE — ANTIFOG SOLUTION W/FOAM PAD 31142527

## (undated) RX ORDER — LIDOCAINE HYDROCHLORIDE AND EPINEPHRINE 10; 10 MG/ML; UG/ML
INJECTION, SOLUTION INFILTRATION; PERINEURAL
Status: DISPENSED
Start: 2020-01-01

## (undated) RX ORDER — PROPOFOL 10 MG/ML
INJECTION, EMULSION INTRAVENOUS
Status: DISPENSED
Start: 2020-01-01

## (undated) RX ORDER — FENTANYL CITRATE 50 UG/ML
INJECTION, SOLUTION INTRAMUSCULAR; INTRAVENOUS
Status: DISPENSED
Start: 2020-01-01

## (undated) RX ORDER — DEXAMETHASONE SODIUM PHOSPHATE 10 MG/ML
INJECTION, SOLUTION INTRAMUSCULAR; INTRAVENOUS
Status: DISPENSED
Start: 2020-01-01

## (undated) RX ORDER — GLYCOPYRROLATE 0.2 MG/ML
INJECTION, SOLUTION INTRAMUSCULAR; INTRAVENOUS
Status: DISPENSED
Start: 2020-01-01

## (undated) RX ORDER — LIDOCAINE HYDROCHLORIDE 40 MG/ML
SOLUTION TOPICAL
Status: DISPENSED
Start: 2020-01-01